# Patient Record
Sex: MALE | Race: BLACK OR AFRICAN AMERICAN | NOT HISPANIC OR LATINO | Employment: FULL TIME | URBAN - METROPOLITAN AREA
[De-identification: names, ages, dates, MRNs, and addresses within clinical notes are randomized per-mention and may not be internally consistent; named-entity substitution may affect disease eponyms.]

---

## 2018-08-17 ENCOUNTER — APPOINTMENT (OUTPATIENT)
Dept: GENERAL RADIOLOGY | Facility: CLINIC | Age: 43
End: 2018-08-17
Attending: EMERGENCY MEDICINE

## 2018-08-17 ENCOUNTER — HOSPITAL ENCOUNTER (EMERGENCY)
Facility: CLINIC | Age: 43
Discharge: HOME | End: 2018-08-17
Attending: EMERGENCY MEDICINE | Admitting: EMERGENCY MEDICINE

## 2018-08-17 VITALS
WEIGHT: 197 LBS | DIASTOLIC BLOOD PRESSURE: 80 MMHG | TEMPERATURE: 98.7 F | OXYGEN SATURATION: 100 % | SYSTOLIC BLOOD PRESSURE: 125 MMHG | HEART RATE: 60 BPM | RESPIRATION RATE: 16 BRPM

## 2018-08-17 DIAGNOSIS — R07.89 CHEST WALL PAIN: ICD-10-CM

## 2018-08-17 LAB
ANION GAP SERPL CALCULATED.3IONS-SCNC: 5 MMOL/L (ref 3–14)
BASOPHILS # BLD AUTO: 0 10E9/L (ref 0–0.2)
BASOPHILS NFR BLD AUTO: 0.4 %
BUN SERPL-MCNC: 13 MG/DL (ref 7–30)
CALCIUM SERPL-MCNC: 8 MG/DL (ref 8.5–10.1)
CHLORIDE SERPL-SCNC: 110 MMOL/L (ref 94–109)
CO2 SERPL-SCNC: 27 MMOL/L (ref 20–32)
CREAT SERPL-MCNC: 0.85 MG/DL (ref 0.66–1.25)
DIFFERENTIAL METHOD BLD: ABNORMAL
EOSINOPHIL # BLD AUTO: 0.1 10E9/L (ref 0–0.7)
EOSINOPHIL NFR BLD AUTO: 2.5 %
ERYTHROCYTE [DISTWIDTH] IN BLOOD BY AUTOMATED COUNT: 13.3 % (ref 10–15)
GFR SERPL CREATININE-BSD FRML MDRD: >90 ML/MIN/1.7M2
GLUCOSE SERPL-MCNC: 93 MG/DL (ref 70–99)
HCT VFR BLD AUTO: 33.9 % (ref 40–53)
HGB BLD-MCNC: 11.7 G/DL (ref 13.3–17.7)
IMM GRANULOCYTES # BLD: 0 10E9/L (ref 0–0.4)
IMM GRANULOCYTES NFR BLD: 0.2 %
INTERPRETATION ECG - MUSE: NORMAL
LYMPHOCYTES # BLD AUTO: 2 10E9/L (ref 0.8–5.3)
LYMPHOCYTES NFR BLD AUTO: 38.1 %
MCH RBC QN AUTO: 27.4 PG (ref 26.5–33)
MCHC RBC AUTO-ENTMCNC: 34.5 G/DL (ref 31.5–36.5)
MCV RBC AUTO: 79 FL (ref 78–100)
MONOCYTES # BLD AUTO: 0.3 10E9/L (ref 0–1.3)
MONOCYTES NFR BLD AUTO: 5.7 %
NEUTROPHILS # BLD AUTO: 2.8 10E9/L (ref 1.6–8.3)
NEUTROPHILS NFR BLD AUTO: 53.1 %
NRBC # BLD AUTO: 0 10*3/UL
NRBC BLD AUTO-RTO: 0 /100
PLATELET # BLD AUTO: 204 10E9/L (ref 150–450)
POTASSIUM SERPL-SCNC: 3.8 MMOL/L (ref 3.4–5.3)
RBC # BLD AUTO: 4.27 10E12/L (ref 4.4–5.9)
SODIUM SERPL-SCNC: 142 MMOL/L (ref 133–144)
TROPONIN I SERPL-MCNC: <0.015 UG/L (ref 0–0.04)
TROPONIN I SERPL-MCNC: <0.015 UG/L (ref 0–0.04)
WBC # BLD AUTO: 5.2 10E9/L (ref 4–11)

## 2018-08-17 PROCEDURE — 71046 X-RAY EXAM CHEST 2 VIEWS: CPT

## 2018-08-17 PROCEDURE — 80048 BASIC METABOLIC PNL TOTAL CA: CPT | Performed by: EMERGENCY MEDICINE

## 2018-08-17 PROCEDURE — 99285 EMERGENCY DEPT VISIT HI MDM: CPT | Performed by: EMERGENCY MEDICINE

## 2018-08-17 PROCEDURE — 93005 ELECTROCARDIOGRAM TRACING: CPT | Performed by: EMERGENCY MEDICINE

## 2018-08-17 PROCEDURE — 84484 ASSAY OF TROPONIN QUANT: CPT | Mod: 91 | Performed by: EMERGENCY MEDICINE

## 2018-08-17 PROCEDURE — 99284 EMERGENCY DEPT VISIT MOD MDM: CPT | Mod: 25 | Performed by: EMERGENCY MEDICINE

## 2018-08-17 PROCEDURE — 25000132 ZZH RX MED GY IP 250 OP 250 PS 637: Performed by: EMERGENCY MEDICINE

## 2018-08-17 PROCEDURE — 93010 ELECTROCARDIOGRAM REPORT: CPT | Mod: Z6 | Performed by: EMERGENCY MEDICINE

## 2018-08-17 PROCEDURE — 85025 COMPLETE CBC W/AUTO DIFF WBC: CPT | Performed by: EMERGENCY MEDICINE

## 2018-08-17 RX ORDER — ASPIRIN 81 MG/1
324 TABLET, CHEWABLE ORAL ONCE
Status: COMPLETED | OUTPATIENT
Start: 2018-08-17 | End: 2018-08-17

## 2018-08-17 RX ORDER — NAPROXEN 500 MG/1
500 TABLET ORAL 2 TIMES DAILY WITH MEALS
Qty: 30 TABLET | Refills: 0 | Status: SHIPPED | OUTPATIENT
Start: 2018-08-17 | End: 2018-09-01

## 2018-08-17 RX ADMIN — ASPIRIN 81 MG CHEWABLE TABLET 324 MG: 81 TABLET CHEWABLE at 07:37

## 2018-08-17 ASSESSMENT — ENCOUNTER SYMPTOMS
COLOR CHANGE: 0
SHORTNESS OF BREATH: 0
EYE REDNESS: 0
ARTHRALGIAS: 0
ABDOMINAL PAIN: 0
DIFFICULTY URINATING: 0
HEADACHES: 0
FEVER: 0
CONFUSION: 0
NECK STIFFNESS: 0

## 2018-08-17 NOTE — ED AVS SNAPSHOT
[facility], Emergency Department    [address]    [facility] [place] [address]    Phone:  [phone]    Fax:  [phone]                                       Ajith Delvalle   MRN: [id number]    Department:  [facility], Emergency Department   Date of Visit:  8/17/2018           Patient Information     Date Of Birth          1975        Your diagnoses for this visit were:     Chest wall pain        You were seen by Tee Claire DO.        Discharge Instructions         Chest Wall Pain: Costochondritis    The chest pain that you have had today is caused by costochondritis. This condition is caused by an inflammation of the cartilage joining your ribs to your breastbone. It is not caused by heart or lung problems. Your healthcare team has made sure that the chest pain you feel is not from a life threatening cause of chest pain such as heart attack, collapsed lung, blood clot in the lung, tear in the aorta, or esophageal rupture. The inflammation may have been brought on by a blow to the chest, lifting heavy objects, intense exercise, or an illness that made you cough and sneeze a lot. It often occurs during times of emotional stress. It can be painful, but it is not dangerous. It usually goes away in 1 to 2 weeks. But it may happen again. Rarely, a more serious condition may cause symptoms similar to costochondritis. That s why it s important to watch for the warning signs listed below.  Home care  Follow these guidelines when caring for yourself at home:    If you feel that emotional stress is a cause of your condition, try to figure out the sources of that stress. It may not be obvious. Learn ways to deal with the stress in your life. This can include regular exercise, muscle relaxation, meditation, or simply taking time out for yourself.    You may use acetaminophen, ibuprofen, or naproxen to control pain, unless another pain medicine was prescribed. If you have liver or kidney disease or  ever had a stomach ulcer, talk with your healthcare provider before using these medicines.    You can also help ease pain by using a hot, wet compress or heating pad. Use this with or without a medicated skin cream that helps relieves pain.    Do stretching exercise as advised by your provider.    Take any prescribed medicines as directed.  Follow-up care  Follow up with your healthcare provider, or as advised, if you do not start to get better in the next 2 days.  When to seek medical advice  Call your healthcare provider right away if any of these occur:    A change in the type of pain. Call if it feels different, becomes more serious, lasts longer, or spreads into your shoulder, arm, neck, jaw, or back.    Shortness of breath or pain gets worse when you breathe    Weakness, dizziness, or fainting    Cough with dark-colored sputum (phlegm) or blood    Abdominal pain    Dark red or black stools    Fever of 100.4 F (38 C) or higher, or as directed by your healthcare provider  Date Last Reviewed: 12/1/2016 2000-2017 [employer]. [address] [place] [address]. All rights reserved. This information is not intended as a substitute for professional medical care. Always follow your healthcare professional's instructions.      Follow-up with your regular physician for further management of her ongoing medical issues.  Return to the emergency department for any new or worsening symptoms.    24 Hour Appointment Hotline       To make an appointment at any [facility] clinic, call [phone] ([phone]). If you don't have a family doctor or clinic, we will help you find one. [facility] clinics are conveniently located to serve the needs of you and your family.             Review of your medicines      START taking        Dose / Directions Last dose taken    naproxen 500 MG tablet   Commonly known as:  NAPROSYN   Dose:  500 mg   Quantity:  30 tablet        Take 1 tablet (500 mg) by mouth 2 times  "daily (with meals) for 15 days   Refills:  0                Prescriptions were sent or printed at these locations (1 Prescription)                   Other Prescriptions                Printed at Department/Unit printer (1 of 1)         naproxen (NAPROSYN) 500 MG tablet                Procedures and tests performed during your visit     Procedure/Test Number of Times Performed    Basic metabolic panel 1    CBC with platelets differential 1    Cardiac Continuous Monitoring 1    EKG 12 lead 1    Peripheral IV: Standard 1    Pulse oximetry nursing 1    Troponin I 2    XR Chest 2 Views 1      Orders Needing Specimen Collection     None      Pending Results     Date and Time Order Name Status Description    8/17/2018 0713 XR Chest 2 Views Preliminary             Pending Culture Results     No orders found from 8/15/2018 to 8/18/2018.            Pending Results Instructions     If you had any lab results that were not finalized at the time of your Discharge, you can call the ED Lab Result RN at [phone]. You will be contacted by this team for any positive Lab results or changes in treatment. The nurses are available 7 days a week from 10A to 6:30P.  You can leave a message 24 hours per day and they will return your call.        Thank you for choosing [facility]       Thank you for choosing [facility] for your care. Our goal is always to provide you with excellent care. Hearing back from our patients is one way we can continue to improve our services. Please take a few minutes to complete the written survey that you may receive in the mail after you visit with us. Thank you!        [facility] Information     [facility] lets you send messages to your doctor, view your test results, renew your prescriptions, schedule appointments and more. To sign up, go to [email or web address] . Click on \"Log in\" on the left side of the screen, which will take you to the Welcome page. Then click on \"Sign up Now\" on the right side of the page. "     You will be asked to enter the access code listed below, as well as some personal information. Please follow the directions to create your username and password.     Your access code is: [email or web address]  Expires: 11/15/2018 10:54 AM     Your access code will  in 90 days. If you need help or a new code, please call your [facility] clinic or [phone].        Care EveryWhere ID     This is your Care EveryWhere ID. This could be used by other organizations to access your [facility] medical records          Equal Access to Services     [facility]: Hadmartín Milligan, nain best, carlos zavaleta, deep gutiérrez . So [facility] [phone].    ATENCIÓN: Si habla español, tiene a kincaid disposición servicios gratuitos de asistencia lingüística. Levy al [phone].    We comply with applicable federal civil rights laws and [place] laws. We do not discriminate on the basis of race, color, national origin, age, disability, sex, sexual orientation, or gender identity.            After Visit Summary       This is your record. Keep this with you and show to your community pharmacist(s) and doctor(s) at your next visit.

## 2018-08-17 NOTE — DISCHARGE INSTRUCTIONS
Chest Wall Pain: Costochondritis    The chest pain that you have had today is caused by costochondritis. This condition is caused by an inflammation of the cartilage joining your ribs to your breastbone. It is not caused by heart or lung problems. Your healthcare team has made sure that the chest pain you feel is not from a life threatening cause of chest pain such as heart attack, collapsed lung, blood clot in the lung, tear in the aorta, or esophageal rupture. The inflammation may have been brought on by a blow to the chest, lifting heavy objects, intense exercise, or an illness that made you cough and sneeze a lot. It often occurs during times of emotional stress. It can be painful, but it is not dangerous. It usually goes away in 1 to 2 weeks. But it may happen again. Rarely, a more serious condition may cause symptoms similar to costochondritis. That s why it s important to watch for the warning signs listed below.  Home care  Follow these guidelines when caring for yourself at home:    If you feel that emotional stress is a cause of your condition, try to figure out the sources of that stress. It may not be obvious. Learn ways to deal with the stress in your life. This can include regular exercise, muscle relaxation, meditation, or simply taking time out for yourself.    You may use acetaminophen, ibuprofen, or naproxen to control pain, unless another pain medicine was prescribed. If you have liver or kidney disease or ever had a stomach ulcer, talk with your healthcare provider before using these medicines.    You can also help ease pain by using a hot, wet compress or heating pad. Use this with or without a medicated skin cream that helps relieves pain.    Do stretching exercise as advised by your provider.    Take any prescribed medicines as directed.  Follow-up care  Follow up with your healthcare provider, or as advised, if you do not start to get better in the next 2 days.  When to seek medical  advice  Call your healthcare provider right away if any of these occur:    A change in the type of pain. Call if it feels different, becomes more serious, lasts longer, or spreads into your shoulder, arm, neck, jaw, or back.    Shortness of breath or pain gets worse when you breathe    Weakness, dizziness, or fainting    Cough with dark-colored sputum (phlegm) or blood    Abdominal pain    Dark red or black stools    Fever of 100.4 F (38 C) or higher, or as directed by your healthcare provider  Date Last Reviewed: 12/1/2016 2000-2017 [employer]. [address] [place] [address]. All rights reserved. This information is not intended as a substitute for professional medical care. Always follow your healthcare professional's instructions.      Follow-up with your regular physician for further management of her ongoing medical issues.  Return to the emergency department for any new or worsening symptoms.

## 2018-08-17 NOTE — ED PROVIDER NOTES
History     Chief Complaint   Patient presents with     Chest Pain     Pt c/o Chest pain after lifting something heavy at work.     GENARO Delvalle is a 43 year old male who presents with a chief complaint of chest pain.  Patient was at work 3 hours ago moving large bundles of sheets.  He had immediate onset of sharp left-sided chest pain when he lifted a large bag if she eats.  The pain resolved when he put the sheets down.  He rested for several minutes and then picked up another bag eliciting similar symptoms.  He then moved small amount of sheets and did not have any issues.  Later as he got into his car and was buckling and he had another episode of sharp chest pain.  This also lasted less than a minute similar to the previous episodes.  At that point he decided to come in to the emergency department for evaluation.  Patient denies any nausea or vomiting or diaphoresis during the symptoms.  He does not have any shortness of breath and did not previously.  He has no known history of coronary artery disease.  His risk factors include smoking as well as hypertension.  He has not had any cough, fevers, other signs of illness.  Patient has not previously had a stress test or angiogram..  Besides the 3 episodes of brief chest pain he has not had any current chest pain.  He has not noticed any previous chest pain before today.    I have reviewed the Medications, Allergies, Past Medical and Surgical History, and Social History in the [employer] system.  Past Medical History:   Diagnosis Date     Hypertension        Past Surgical History:   Procedure Laterality Date     BIOPSY OF SKIN LESION         Family History   Problem Relation Age of Onset     Skin Cancer No family hx of      no family hx of skin cancer       Social History   Substance Use Topics     Smoking status: Current Every Day Smoker     Smokeless tobacco: Never Used     Alcohol use No       Review of Systems   Constitutional: Negative for fever.   HENT:  Negative for congestion.    Eyes: Negative for redness.   Respiratory: Negative for shortness of breath.    Cardiovascular: Positive for chest pain.   Gastrointestinal: Negative for abdominal pain.   Genitourinary: Negative for difficulty urinating.   Musculoskeletal: Negative for arthralgias and neck stiffness.   Skin: Negative for color change.   Neurological: Negative for headaches.   Psychiatric/Behavioral: Negative for confusion.   All other systems reviewed and are negative.      Physical Exam   BP: 155/85  Pulse: 55  Temp: 97.8  F (36.6  C)  Resp: 16  Weight: 89.4 kg (197 lb)  SpO2: 99 %      Physical Exam   Constitutional: No distress.   HENT:   Head: Atraumatic.   Mouth/Throat: Oropharynx is clear and moist. No oropharyngeal exudate.   Eyes: Pupils are equal, round, and reactive to light. No scleral icterus.   Cardiovascular: Normal heart sounds.    Pulmonary/Chest: Breath sounds normal. No respiratory distress. He exhibits no tenderness.   Abdominal: Soft. He exhibits no distension. There is no tenderness.   Musculoskeletal: He exhibits no edema or tenderness.   Neurological: He is alert.   Skin: Skin is warm. He is not diaphoretic.   Psychiatric: He has a normal mood and affect. His behavior is normal.       ED Course     ED Course     Procedures             EKG Interpretation:      Interpreted by Tee Claire  Time reviewed: 0700  Symptoms at time of EKG: none   Rhythm: normal sinus   Rate: 46 bradycardia  Axis: normal  Ectopy: none  Conduction: normal  ST Segments/ T Waves: No ST elevation or depression.  T-wave inversions V3 V4  Q Waves: none  Comparison to prior: No old EKG available    Clinical Impression: normal EKG           Labs Ordered and Resulted from Time of ED Arrival Up to the Time of Departure from the ED   CBC WITH PLATELETS DIFFERENTIAL - Abnormal; Notable for the following:        Result Value    RBC Count 4.27 (*)     Hemoglobin 11.7 (*)     Hematocrit 33.9 (*)     All other  components within normal limits   BASIC METABOLIC PANEL - Abnormal; Notable for the following:     Chloride 110 (*)     Calcium 8.0 (*)     All other components within normal limits   TROPONIN I   TROPONIN I   PULSE OXIMETRY NURSING   CARDIAC CONTINUOUS MONITORING   PERIPHERAL IV CATHETER   PATIENT CARE ORDER          Results for orders placed or performed during the hospital encounter of 08/17/18   XR Chest 2 Views    Narrative    CHEST TWO VIEWS  8/17/2018 8:07 AM     HISTORY: Chest pain.     COMPARISON: None.      Impression    IMPRESSION: Small focus of platelike atelectasis or linear fibrosis  left mid to lower lung zone medially. Otherwise no active  cardiopulmonary disease.   CBC with platelets differential   Result Value Ref Range    WBC 5.2 4.0 - 11.0 10e9/L    RBC Count 4.27 (L) 4.4 - 5.9 10e12/L    Hemoglobin 11.7 (L) 13.3 - 17.7 g/dL    Hematocrit 33.9 (L) 40.0 - 53.0 %    MCV 79 78 - 100 fl    MCH 27.4 26.5 - 33.0 pg    MCHC 34.5 31.5 - 36.5 g/dL    RDW 13.3 10.0 - 15.0 %    Platelet Count 204 150 - 450 10e9/L    Diff Method Automated Method     % Neutrophils 53.1 %    % Lymphocytes 38.1 %    % Monocytes 5.7 %    % Eosinophils 2.5 %    % Basophils 0.4 %    % Immature Granulocytes 0.2 %    Nucleated RBCs 0 0 /100    Absolute Neutrophil 2.8 1.6 - 8.3 10e9/L    Absolute Lymphocytes 2.0 0.8 - 5.3 10e9/L    Absolute Monocytes 0.3 0.0 - 1.3 10e9/L    Absolute Eosinophils 0.1 0.0 - 0.7 10e9/L    Absolute Basophils 0.0 0.0 - 0.2 10e9/L    Abs Immature Granulocytes 0.0 0 - 0.4 10e9/L    Absolute Nucleated RBC 0.0    Basic metabolic panel   Result Value Ref Range    Sodium 142 133 - 144 mmol/L    Potassium 3.8 3.4 - 5.3 mmol/L    Chloride 110 (H) 94 - 109 mmol/L    Carbon Dioxide 27 20 - 32 mmol/L    Anion Gap 5 3 - 14 mmol/L    Glucose 93 70 - 99 mg/dL    Urea Nitrogen 13 7 - 30 mg/dL    Creatinine 0.85 0.66 - 1.25 mg/dL    GFR Estimate >90 >60 mL/min/1.7m2    GFR Estimate If Black >90 >60 mL/min/1.7m2     Calcium 8.0 (L) 8.5 - 10.1 mg/dL   Troponin I   Result Value Ref Range    Troponin I ES <0.015 0.000 - 0.045 ug/L   Troponin I   Result Value Ref Range    Troponin I ES <0.015 0.000 - 0.045 ug/L       Assessments & Plan (with Medical Decision Making)   43-year-old male presents with a chief complaint of chest pain.  His pain came on suddenly during lifting of a heavy pile of sheets.  It was very brief and relieved once he said that shoots down.  The pain recurred when he did a similar reaction later.  Again it was very brief.  Patient was not exerting himself at the time.  He has not had any exertional chest pain or other chest pain to speak of prior to this.  His EKG and troponins are normal and negative.  Patient's risk factors consist of high blood pressure and smoking.  This atypical chest pain does not sound cardiac in nature.  Given the pain came on while he was flexing his chest muscles in order to lift a heavy object and was relieved immediately after he put the object down I suspect this is musculoskeletal in nature.  He does not have any risk factor for pulmonary embolism such as long periods of immobility, travel, history of clotting disorders.  He has not have any central chest pain rating to his back or other symptoms suggestive of aortic dissection.  Discussed the possibility of stress testing with the patient.  They do not want to stay for stress testing at this time.  Recommend he follow-up with his doctor for further evaluation.  We will send him home with a prescription for Naprosyn for his chest wall pain.    I have reviewed the nursing notes.    I have reviewed the findings, diagnosis, plan and need for follow up with the patient.    Discharge Medication List as of 8/17/2018 10:54 AM      START taking these medications    Details   naproxen (NAPROSYN) 500 MG tablet Take 1 tablet (500 mg) by mouth 2 times daily (with meals) for 15 days, Disp-30 tablet, R-0, Local Print             Final diagnoses:    Chest wall pain       8/17/2018   [facility], [place], EMERGENCY DEPARTMENT     Tee Claire,   08/17/18 7879

## 2018-08-17 NOTE — ED AVS SNAPSHOT
[facility], [place], Emergency Department    [address]    [facility] [place] [address]    Phone:  [phone]    Fax:  [phone]                                       Ajith Delvalle   MRN: [id number]    Department:  [facility], Emergency Department   Date of Visit:  8/17/2018           After Visit Summary Signature Page     I have received my discharge instructions, and my questions have been answered. I have discussed any challenges I see with this plan with the nurse or doctor.    ..........................................................................................................................................  Patient/Patient Representative Signature      ..........................................................................................................................................  Patient Representative Print Name and Relationship to Patient    ..................................................               ................................................  Date                                            Time    ..........................................................................................................................................  Reviewed by Signature/Title    ...................................................              ..............................................  Date                                                            Time

## 2022-08-10 ENCOUNTER — MEDICAL CORRESPONDENCE (OUTPATIENT)
Dept: HEALTH INFORMATION MANAGEMENT | Facility: CLINIC | Age: 47
End: 2022-08-10

## 2022-08-17 ENCOUNTER — TRANSFERRED RECORDS (OUTPATIENT)
Dept: HEALTH INFORMATION MANAGEMENT | Facility: CLINIC | Age: 47
End: 2022-08-17

## 2022-08-17 ENCOUNTER — MEDICAL CORRESPONDENCE (OUTPATIENT)
Dept: HEALTH INFORMATION MANAGEMENT | Facility: CLINIC | Age: 47
End: 2022-08-17

## 2022-09-04 ENCOUNTER — TRANSFERRED RECORDS (OUTPATIENT)
Dept: HEALTH INFORMATION MANAGEMENT | Facility: CLINIC | Age: 47
End: 2022-09-04

## 2022-09-12 ENCOUNTER — TRANSFERRED RECORDS (OUTPATIENT)
Dept: HEALTH INFORMATION MANAGEMENT | Facility: CLINIC | Age: 47
End: 2022-09-12

## 2022-09-13 ENCOUNTER — TRANSFERRED RECORDS (OUTPATIENT)
Dept: HEALTH INFORMATION MANAGEMENT | Facility: CLINIC | Age: 47
End: 2022-09-13

## 2022-09-18 ENCOUNTER — TRANSFERRED RECORDS (OUTPATIENT)
Dept: HEALTH INFORMATION MANAGEMENT | Facility: CLINIC | Age: 47
End: 2022-09-18

## 2022-09-19 ENCOUNTER — TRANSFERRED RECORDS (OUTPATIENT)
Dept: HEALTH INFORMATION MANAGEMENT | Facility: CLINIC | Age: 47
End: 2022-09-19

## 2022-09-20 ENCOUNTER — MEDICAL CORRESPONDENCE (OUTPATIENT)
Dept: HEALTH INFORMATION MANAGEMENT | Facility: CLINIC | Age: 47
End: 2022-09-20

## 2022-12-28 ENCOUNTER — TELEPHONE (OUTPATIENT)
Dept: ADDICTION MEDICINE | Facility: CLINIC | Age: 47
End: 2022-12-28

## 2022-12-28 NOTE — TELEPHONE ENCOUNTER
Writer received a ROMMEL assessment, update, insurance information, and an MICHELLE. Forwarded to [facility] to start referral and to scan into the MR.

## 2023-01-02 ENCOUNTER — HOSPITAL ENCOUNTER (OUTPATIENT)
Dept: BEHAVIORAL HEALTH | Facility: CLINIC | Age: 48
Discharge: HOME | End: 2023-01-02
Attending: FAMILY MEDICINE
Payer: COMMERCIAL

## 2023-01-02 VITALS
TEMPERATURE: 98 F | OXYGEN SATURATION: 98 % | DIASTOLIC BLOOD PRESSURE: 88 MMHG | SYSTOLIC BLOOD PRESSURE: 130 MMHG | HEART RATE: 97 BPM

## 2023-01-02 LAB — SARS-COV-2 RNA RESP QL NAA+PROBE: NEGATIVE

## 2023-01-02 PROCEDURE — 1002N00001 HC LODGING PLUS FACILITY CHARGE ADULT

## 2023-01-02 PROCEDURE — U0005 INFEC AGEN DETEC AMPLI PROBE: HCPCS | Performed by: PSYCHIATRY & NEUROLOGY

## 2023-01-02 RX ORDER — LANOLIN ALCOHOL/MO/W.PET/CERES
3 CREAM (GRAM) TOPICAL
COMMUNITY
End: 2023-01-29

## 2023-01-02 RX ORDER — GUAIFENESIN 200 MG/10ML
10 LIQUID ORAL EVERY 4 HOURS PRN
COMMUNITY
End: 2023-01-29

## 2023-01-02 RX ORDER — AMOXICILLIN 250 MG
2 CAPSULE ORAL DAILY PRN
COMMUNITY
End: 2023-01-29

## 2023-01-02 RX ORDER — MAGNESIUM HYDROXIDE/ALUMINUM HYDROXICE/SIMETHICONE 120; 1200; 1200 MG/30ML; MG/30ML; MG/30ML
30 SUSPENSION ORAL EVERY 6 HOURS PRN
COMMUNITY
End: 2023-01-29

## 2023-01-02 RX ORDER — PENICILLIN V POTASSIUM 500 MG/1
500 TABLET, FILM COATED ORAL 4 TIMES DAILY
COMMUNITY
End: 2023-01-29

## 2023-01-02 RX ORDER — LORATADINE 10 MG/1
10 TABLET ORAL DAILY PRN
COMMUNITY
End: 2023-01-29

## 2023-01-02 RX ORDER — IBUPROFEN 200 MG
200-400 TABLET ORAL EVERY 6 HOURS PRN
COMMUNITY
End: 2023-01-29

## 2023-01-02 RX ORDER — ACETAMINOPHEN 325 MG/1
325-650 TABLET ORAL EVERY 4 HOURS PRN
COMMUNITY
End: 2023-01-29

## 2023-01-02 ASSESSMENT — COLUMBIA-SUICIDE SEVERITY RATING SCALE - C-SSRS
6. HAVE YOU EVER DONE ANYTHING, STARTED TO DO ANYTHING, OR PREPARED TO DO ANYTHING TO END YOUR LIFE?: NO
TOTAL  NUMBER OF INTERRUPTED ATTEMPTS LIFETIME: NO
1. IN THE PAST MONTH, HAVE YOU WISHED YOU WERE DEAD OR WISHED YOU COULD GO TO SLEEP AND NOT WAKE UP?: YES
1. HAVE YOU WISHED YOU WERE DEAD OR WISHED YOU COULD GO TO SLEEP AND NOT WAKE UP?: YES
2. HAVE YOU ACTUALLY HAD ANY THOUGHTS OF KILLING YOURSELF?: NO
1. IN THE PAST MONTH, HAVE YOU WISHED YOU WERE DEAD OR WISHED YOU COULD GO TO SLEEP AND NOT WAKE UP?: SEE ABOVE
REASONS FOR IDEATION PAST MONTH: COMPLETELY TO END OR STOP THE PAIN (YOU COULDN'T GO ON LIVING WITH THE PAIN OR HOW YOU WERE FEELING)
TOTAL  NUMBER OF ABORTED OR SELF INTERRUPTED ATTEMPTS LIFETIME: NO
ATTEMPT LIFETIME: NO
REASONS FOR IDEATION LIFETIME: COMPLETELY TO END OR STOP THE PAIN (YOU COULDN'T GO ON LIVING WITH THE PAIN OR HOW YOU WERE FEELING)

## 2023-01-02 ASSESSMENT — ANXIETY QUESTIONNAIRES
GAD7 TOTAL SCORE: 7
4. TROUBLE RELAXING: SEVERAL DAYS
5. BEING SO RESTLESS THAT IT IS HARD TO SIT STILL: SEVERAL DAYS
GAD7 TOTAL SCORE: 7
1. FEELING NERVOUS, ANXIOUS, OR ON EDGE: NOT AT ALL
7. FEELING AFRAID AS IF SOMETHING AWFUL MIGHT HAPPEN: SEVERAL DAYS
2. NOT BEING ABLE TO STOP OR CONTROL WORRYING: SEVERAL DAYS
IF YOU CHECKED OFF ANY PROBLEMS ON THIS QUESTIONNAIRE, HOW DIFFICULT HAVE THESE PROBLEMS MADE IT FOR YOU TO DO YOUR WORK, TAKE CARE OF THINGS AT HOME, OR GET ALONG WITH OTHER PEOPLE: NOT DIFFICULT AT ALL
6. BECOMING EASILY ANNOYED OR IRRITABLE: SEVERAL DAYS
3. WORRYING TOO MUCH ABOUT DIFFERENT THINGS: MORE THAN HALF THE DAYS

## 2023-01-02 ASSESSMENT — PATIENT HEALTH QUESTIONNAIRE - PHQ9: SUM OF ALL RESPONSES TO PHQ QUESTIONS 1-9: 11
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
## 2023-01-02 NOTE — PROGRESS NOTES
Progress Note    This patient had a Assessment and Placement Summary Update on 12-23-22 completed by Chasidy Bruce [facility] ([facility]).  This patient was seen for a face to face update of the Assessment and Placement Summary Update on 1/2/2023  [facility]  OUTSIDE: A paper copy of the Assessment and Placement Summary Update will be placed in the patient's paper chart until being scanned into the patient's electronic medical record in Epic under the Media tab.    Alcohol/Drug use since the last CD evaluation (include date of last use):     Pt reports l/u date of 11-27-22 for heroin/fentanyl and marijuana. Pt denies any withdrawal symptoms.      Please note any other clinical changes since the last CD evaluation (such as medication changes, additional legal charges, detoxification admissions, overdoses, etc.)     No significant changes since the last CD evaluation        ASAM Dimensions Original scores Current Scores   I.) Intoxication and Withdrawal: 1 0   II.) Biomedical:  1 1   III.) Emotional and Behavioral:  2 2   IV.) Readiness to Change:  2 2   V.) Relapse Potential: 4 4   VI.) Recovery Environmental: 4 4     Please list clinical justifications for the above ASAM score changes since the original comprehensive assessment:     The patient's score on Dimension I changed from a 1 to a 0.  The patient had not consumed any alcohol or drugs since 11-27-22 and he does not appear to have any current risk of having significant withdrawal symptoms.        Current KO: Current UA:     0.000     Negative for all screened drugs.       PHQ-9, GALA-7   PHQ-9 on 1/2/2023  GALA-7 on 5/18/2021 1/2/2023    The patient's PHQ-9 score was 11 out of 27, indicating moderate depression. The patient's GALA-7 score was 7 out of 21, indicating mild anxiety.       Additional Risk Factors:    Tendency to be socially isolated and/or cut off from the support of others     A recent death of someone close to  "the patient and/or unresolved grief and loss issues     Significant history of trauma and/or abuse issues     A triggering event(s) leading to humiliation, shame or despair     History of impulsive or aggressive behaviors     Significant legal problems including being at risk of incarceration   Protective Factors:    Having people in his/her life that would prevent the patient from considering a suicide attempt (i.e. young children, spouse, parents, etc.)     An absence of mental health issues or stable and well treated mental health issues     An absence of chronic health problems or stable and well treated chronic health issues     Having easy access to supportive family members     Having a good community support network     Having cultural, Pentecostalism or spiritual beliefs that discourage suicide     Having restricted access to highly lethal means of suicide     Risk Status     0. - Very Low Risk:  Evaluation Counselors:  Document in Epic / SBAR to counselor \"Very Low Risk\".      Treatment Counselors:  Reassess upon admission as applicable, assess weekly in progress notes under Dimension 3 and summarize in Discharge / Treatment summary under Dimension 3.     Additional information to support suicide risk rating: There was no additional information to provide at this time.     [place] Suicide Severity Rating Scale (Lifetime/Recent)  [place] Suicide Severity Rating (Lifetime/Recent) 1/2/2023   1. Wish to be Dead (Lifetime) 1   Wish to be Dead Description (Lifetime) shame, guilt and depression while in long-term   1. Wish to be Dead (Past 1 Month) 1   Wish to be Dead Description (Past 1 Month) see above   2. Non-Specific Active Suicidal Thoughts (Lifetime) 0   Most Severe Ideation Rating (Lifetime) 1   Description of Most Severe Ideation (Lifetime) see above   Most Severe Ideation Rating (Past 1 Month) 1   Description of Most Severe Ideation (Past 1 Month) see above   Frequency (Lifetime) 1   Frequency (Past 1 Month) 1 "   Duration (Lifetime) 1   Duration (Past 1 Month) 1   Controllability (Lifetime) 1   Controllability (Past 1 Month) 1   Deterrents (Lifetime) 1   Deterrents (Past 1 Month) 1   Reasons for Ideation (Lifetime) 5   Reasons for Ideation (Past 1 Month) 5   Actual Attempt (Lifetime) 0   Has subject engaged in non-suicidal self-injurious behavior? (Lifetime) 0   Interrupted Attempts (Lifetime) 0   Aborted or Self-Interrupted Attempt (Lifetime) 0   Preparatory Acts or Behavior (Lifetime) 0   Calculated C-SSRS Risk Score (Lifetime/Recent) Low Risk

## 2023-01-02 NOTE — PROGRESS NOTES
[facility] Assessment      Vital signs:     See flowsheet     Direct admission    Counselor: Group C  Drug of Choice: opioids   Last use:1 month ago   Home clinic/MD:Needs to est   Psychiatrist/therapist: needs to est     Medical history/current conditions:  none    H&P Screen:  H&P within the last 90 days: Yes.  Date: 12/14/22 Location: office visit urgent care     Mental Health diagnosis: Anxiety and depression   Medication compliant?: yes  Recent sucidal thoughts? no     When? na  Current thought of self-harm? no    Plan? Na     Pain assessment:   Pt. Experiencing pain at this time?  No    LP Community Medical Screen for COVID-19    ______________________________________________________________________________________________________________________  Do you have any of the following NEW or worsening symptoms NOT attributed to pre-existing conditions?    No    o Fever of 100.0  F (37.8 C) or over  o Chills  o Cough  o Shortness of Breath  o Loss of taste or smell  o Generalized body aches  o Persistent headache  o Sore throat (or trouble eating or drinking in young children?)  o Nausea, Vomiting, or diarrhea (loose stools)    Did you test positive for COVID-19 in the last 10 days or are you waiting on the test results due to an exposure or symptoms?  No    Has anyone told you to self-quarantine due to exposure to someone with COVID-19?  No    I______________________________________________________________________________________________  If you are admitting directly from the community you will be required to stay in your room until COVID lab results confirm negative. If COVID results are positive, You will have to exit the LP program, quarantine as recommended per CDC and then may return for CD treatment after symptoms have resolved.  What is your exit plan should you be positive for COVID today or anytime during your stay? home    COVID-19 Test completed by LPRN ? Yes    COVID-19 - Pt informed  of the following while at :    1) Practice good hand washing hygiene and avoid touching face    2) If pt has any of the symptoms below, notify staff immediately.      Fever     Cough     Shortness of breath or difficulty breathing     Chills     Repeated shaking with chills    Muscle pain     3) COVID-19 testing may be initiated more than once during your stay.  If COVID results are at anytime positive, the pt will follow exit plan as listed above,  quarantine as recommended per CDC and then may return for CD treatment after symptoms have resolved.     4) Per COVID protocol, during your stay at , social distancing is required AND mouth and nose must be covered at all times with facial mask while out in milieu.      5) Patients will not be allowed to go to any outside appointments, all outside appointments will need to be virtual or by phone    RN Assessment of Patient's Ability to Safely Manage and Self-Administer Respiratory Treatments    Has experience in the management of Respiratory (If NA, indicate and move to Integrative Therapies): NA    Patient tobacco use: 4 cig per day  Are you interested in quitting? no   NRT (Nicotine Replacement therapy) ordered? Pt declined    Pt is aware of the dangers of tobacco cessation and in contemplation.    Pt given written education.    Nutritional Assessment:    Have you ever purged, binged or restricted yourself as a way to control your weight?   No     Are you on a special diet?   No     Do you have any concerns regarding your nutritional status?   No     Have you had any appetite changes in the last 3 months?   No   Have you had weight loss or weight gain of more than 10 lbs in the last 3 months?   If patient gained or lost more than 10 lbs, then refer to program RN / attending Physician for assessment.   No   Was the patient informed of BMI?    Above,  Referral to primary care physician and General nutrition education   Yes   Have you engaged in any risk-taking  behavior that would put you at risk for exposure to blood-borne or sexually transmitted diseases?   No   Do you have any dental problems?   No       Review with pt's for opioid use disorder: (Please delete below if not applicable)    Pt reporting last use of opioid a month ago   Did pt's bring comfort medications and/or Suboxone as instructed to manage withdrawal? Pt does not want suboxone, he is trying t get his Rx for clonidine brought in    LPRN to review with pt the following:     Pt is expected to attend all required LP programming without staff prompting     Compliancy with all prescribed medication self-administration is required     Pt understands LP drug toxicology screening process    Pt given written copy of detox options and/or access to Recovery Clinic    LPRN reviewed with pt the below 'medical concerns/medication refill expectations' while at LP Yes    LP has no rounding provider to assess medical issues or to refill your medications    Please make virtual/phone appt/s with your community provider/s and notify LPRN of date and time    You may not leave LP to attend any medical appt's.     You are responsible for having a plan to refill medications if necessary.  Please allow time to complete this.    Pt verbalizes understanding of the above criteria yes    Nursing Assessment Summary:  As above     On-going nursing intervention required?   No    Acute care visit recommended: no

## 2023-01-02 NOTE — PROGRESS NOTES
Initial Services Plan        Before your first treatment group, please do the following    Immediate health & safety concerns: Go to the emergency room if you start to have withdrawal symptoms.  Look for sober housing and a supportive social network.  Obtain personal items (glasses, hearing aides, medicines, diabetes supplies, clothing, etc.).  Look for a support network (such as AA, NA, DBT group, a Congregation group, etc.)    Suggestions for client during the time between intake & completion of treatment plan:  Tour your treatment center (unit or outpatient clinic).  Introduce yourself to the treatment group.  Spend time getting to know your peers.  Complete your psycho-social paperwork.  Complete the problem list for your treatment plan.  Start drug and alcohol use history.  Review your patient or client handbook.    Client issues to be addressed in the first treatment sessions:  Identify motivations(s) for coming to treatment, i.e. legal, family, job, self  Identify concerns about coming into treatment, i.e. fear of failing again, sharing a room in treatment  Identify concerns about going to group, i.e. fear of talking in group  Identify concerns about contacting [facility]  1/2/2023  12:44 PM

## 2023-01-03 ENCOUNTER — HOSPITAL ENCOUNTER (OUTPATIENT)
Dept: BEHAVIORAL HEALTH | Facility: CLINIC | Age: 48
Discharge: HOME | End: 2023-01-03
Attending: FAMILY MEDICINE
Payer: COMMERCIAL

## 2023-01-03 DIAGNOSIS — F19.20 CHEMICAL DEPENDENCY (H): ICD-10-CM

## 2023-01-03 LAB — FENTANYL UR QL: ABNORMAL

## 2023-01-03 PROCEDURE — 80354 DRUG SCREENING FENTANYL: CPT

## 2023-01-03 PROCEDURE — 1002N00001 HC LODGING PLUS FACILITY CHARGE ADULT

## 2023-01-03 PROCEDURE — H2035 A/D TX PROGRAM, PER HOUR: HCPCS | Mod: HQ

## 2023-01-03 PROCEDURE — 80307 DRUG TEST PRSMV CHEM ANLYZR: CPT

## 2023-01-03 NOTE — GROUP NOTE
"Group Therapy Documentation    PATIENT'S NAME: Ajith Delvalle  MRN:   [id number]  :   1975  ACCT. NUMBER: [id number]  DATE OF SERVICE: 23  START TIME: 12:30 PM  END TIME:  2:30 PM  FACILITATOR(S): Destin Mccormack; Arben Pompa LADC  TOPIC: BEH Group Therapy  Number of patients attending the group:  5  Group Length:  2 Hours    Group Therapy Type: Recovery strategies, Emotion processing, and Health and wellbeing     Summary of Group / Topics Discussed:    Recovery Principles, Sober coping skills, Relationship/socialization, Balanced lifestyle, and Relapse prevention      Group Attendance:  Attended group session    Patient's response to the group topic/interactions:  cooperative with task, discussed personal experience with topic and expressed understanding of topic    Patient appeared to be Actively participating, Attentive and Engaged.        Client specific details:  Recovery group held.  Different hormonal and neurotransmitter interactions were discussed regarding pleasure center and \"feel good\" activities.  Patient reported on past times of abstinence and the rewards of sobriety.    "

## 2023-01-03 NOTE — GROUP NOTE
Group Therapy Documentation    PATIENT'S NAME: Ajith Delvalle  MRN:   [id number]  :   1975  ACCT. NUMBER: [id number]  DATE OF SERVICE: 23  START TIME:  9:00 AM  END TIME: 11:00 AM  FACILITATOR(S): Christina Ovalles LADC  TOPIC: BEH Group Therapy  Number of patients attending the group:  5  Group Length:  2 Hours    Group Therapy Type: Recovery strategies    Summary of Group / Topics Discussed:    Recovery Principles    Patients engaged in a completion ceremony for a peer, and were able to share words of wisdom, appreciate connections, and encourage each other in staying focused and sober.   Patients completed daily check ins and the question of the day.        Patients engaged in reading and processing daily meditations.        Group Attendance:  Attended group session    Patient's response to the group topic/interactions:  cooperative with task    Patient appeared to be Actively participating.        Client specific details:  Ajith  attended AM small group therapy. Patient participated and remained engaged throughout group.

## 2023-01-03 NOTE — GROUP NOTE
Psychoeducation Group Documentation    PATIENT'S NAME: Ajith Delvalle  MRN:   [id number]  :   1975  ACCT. NUMBER: [id number]  DATE OF SERVICE: 23  START TIME:  3:00 PM  END TIME:  4:00 PM  FACILITATOR(S): Eva Verma LADC; Stefany Steele LADC; Betty Ashford  TOPIC: BEH Pyschoeducation  Number of patients attending the group:    Group Length:  1 Hours    Skills Group Therapy Type: Recovery skills, background information on CD and MH By DR. Godoy.    Summary of Group / Topics Discussed:    Symptom management skills          Group Attendance:  Attended group session    Patient's response to the group topic/interactions:  cooperative with task    Patient appeared to be Attentive.         Client specific details: Pt was appropriate and attentive in PM skills group.

## 2023-01-03 NOTE — PROGRESS NOTES
Name: Ajith Delvalle  Date: 1/3/2023  Medical Record: [id number]  Envelope Number: [id number]  List of Contents (List each item separately in new row):   Ibuprofen 800 mg tabs.   Admission:  I am responsible for any personal items that are not sent to the safe or pharmacy.  [facility] is not responsible for loss, theft or damage of any property in my possession.    Patient Signature:  ___________________________________________       Date/Time:__________________________    Staff Signature: __________________________________       Date/Time:__________________________    2nd Staff person, if patient is unable/unwilling to sign:      __________________________________________________________       Date/Time: __________________________    Discharge:  [facility] has returned all of my personal belongings:    Patient Signature: ________________________________________     Date/Time: ____________________________________    Staff Signature: ______________________________________     Date/Time:_____________________________________

## 2023-01-03 NOTE — PROGRESS NOTES
"Comprehensive Assessment Summary     Based on client interview, review of previous assessments and   comprehensive assessment interview the following diagnosis and recommendations are:     Patient: Ajith Delvalle  MRN; [id number]   : 1975  Age: 47 year old Sex: male       Client meets criteria for:  Cannabis Use Disorder Severe - 304.30 (F12.20)  Opioid Use Disorder Severe - 304.00 (F11.20)    Dimension One: Acute Intoxication/Withdrawal Potential     Ratin      (Consider the client's ability to cope with withdrawal symptoms and current state of intoxication) No indication of intoxication or withdrawal. Pt was a direct transfer from the community. Pt denied taking anti-craving medications. Pt reported his last use date of both cannabis and opioids as 2022.       Dimension Two: Biomedical Condition and Complications    Ratin    (Consider the degree to which any physical disorder would interfere with treatment for substance abuse, and the client's ability to tolerate any related discomfort; determine the impact of continued chemical use on the unborn child if the client is pregnant) Pt reported no physical limitations or medical concerns identified which would interfere with full program participation. Pt reported having a PCC at [facility] in [place], [place]. Pt denied having a PCP there. Pt reported dental concerns (chipped teeth), multiple head injuries, and low iron levels.     Dimension Three: Emotional/Behavioral/Cognitive Conditions & Complications  Ratin    (Determine the degree to which any condition or complications are likely to interfere with treatment for substance abuse or with functioning in significant life areas and the likelihood of risk of harm to self or others) Pt reported prior diagnoses of depression and anxiety. Pt reported having been prescribed medications for MH but does not currently have them. Pt reported grief and loss concerns as: \"lost a cousin a couple " "of weeks ago, lost another cousin a couple of years ago, a sister-in-law was killed 6-7 months ago, and lost a baby daughter in .\" Pt reported guilt and shame concerns as: \"being constantly in the court system and [facility] and not being there for his wife and kids.\" Pt reported being restentful of the drug court system due to \"they pulled my away from my sobriety and good life in [facility] where he had a job, house, and cars.\" Pt reported that his emotions, and management of them, do affect his relapses. Pt reported that he maintained three years of sobriety when he lived in [place], [place]. Pt reported that \"things were going good for him in [place] due to being in the child protection program and having a lifestyle change.\" Pt reported that the drug court \"pulled him back to the [facility].\" where he subsequently relapsed. Pt reported that difficult emotions and impulsive behaviors are his main triggers for relapse. Pt lacks follow through of his relapse and coping skills in emotional regulation. Pt's suicide risk rating on 2022 assessment is \"Very Low Risk.\" Pt reported that he was open to trying individual therapy at [facility]. Pt stated that he has had four straight previous years of tx and therapy.     Dimension Four: Treatment Acceptance/Resistance     Ratin    (Consider the amount of support and encouragement necessary to keep the client involved in treatment) Pt expresses verbal motivation to make lifestyle changes. However, his supportive behaviors have lacked both follow through and commitment. Pt continues to use despite serious consequences in major life areas. He presents as in the contemplative stage. Pt reported that his main motivation for tx at this time is to \"stay sober, not go back to using, and to better identify with his emotions, that's it.\"     Dimension Five: Continued Use/Relaspe Prevention     Ratin    (Consider the degree to which the client's recognizes relapse issues " "and has the skills to prevent relapse of either substance use or mental health problems) Pt presents as a high risk for relapse. He has limited insight into his personal relapse cues and effective prevention strategies. Pt reported roughly 50 lifetime arrests with the first being at age 9. Pt reported currently being on probation in [place], [place]. Pt reported attending multiple previous treatment episodes. Despite serious consequences and numerous interventions, his longest period of sobriety is three years. While pt reported a range of sober living and coping skills, pt identified that his follow through \"with what he already knows works for him could use improvement.\" Pt reported previous 12-Step meeting attendance, but stated that \"he doesn't like 12-Step.\" Pt reported that meditation, his spirituality, and when he implements his coping skills help him maintain his sobriety.        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 Pt reported that he does not currently have a job and that \"drug court made him give up his previous job.\" Pt reported that he lacks a sober support network other than for his \"parents, siblings, and wife.\" Pt reported having six children (23 son, 22 son, 20 son, 14 son, 15 daughter, and baby daughter ( ).\" Pt lives with his wife in an apartment. Pt acknowledged that he would most likely have to attend an IOP after discharge from + due to drug court.         I have reviewed the information on the assessment, psychosocial and medical history and checklist: It is current  "

## 2023-01-04 ENCOUNTER — HOSPITAL ENCOUNTER (OUTPATIENT)
Dept: BEHAVIORAL HEALTH | Facility: CLINIC | Age: 48
Discharge: HOME | End: 2023-01-04
Attending: FAMILY MEDICINE
Payer: COMMERCIAL

## 2023-01-04 ENCOUNTER — TELEPHONE (OUTPATIENT)
Dept: BEHAVIORAL HEALTH | Facility: CLINIC | Age: 48
End: 2023-01-04

## 2023-01-04 DIAGNOSIS — F19.20 CHEMICAL DEPENDENCY (H): ICD-10-CM

## 2023-01-04 LAB
FENTANYL UR QL: ABNORMAL
SARS-COV-2 RNA RESP QL NAA+PROBE: NEGATIVE

## 2023-01-04 PROCEDURE — H2035 A/D TX PROGRAM, PER HOUR: HCPCS | Mod: HQ

## 2023-01-04 PROCEDURE — U0003 INFECTIOUS AGENT DETECTION BY NUCLEIC ACID (DNA OR RNA); SEVERE ACUTE RESPIRATORY SYNDROME CORONAVIRUS 2 (SARS-COV-2) (CORONAVIRUS DISEASE [COVID-19]), AMPLIFIED PROBE TECHNIQUE, MAKING USE OF HIGH THROUGHPUT TECHNOLOGIES AS DESCRIBED BY CMS-2020-01-R: HCPCS

## 2023-01-04 PROCEDURE — 1002N00001 HC LODGING PLUS FACILITY CHARGE ADULT

## 2023-01-04 PROCEDURE — 80307 DRUG TEST PRSMV CHEM ANLYZR: CPT

## 2023-01-04 PROCEDURE — 80354 DRUG SCREENING FENTANYL: CPT

## 2023-01-04 NOTE — PROGRESS NOTES
Pt prescribed Penicillin for tooth pain.  Pt missing doses.  Pt informed that in order for antibiotic to work as prescribed he needs to take as ordered.  Pt verbalized understanding and will come to med room 4 times daily for self-administration for tooth pain / potential infection

## 2023-01-04 NOTE — ADDENDUM NOTE
Encounter addended by: Eileen Simon on: 1/4/2023 6:11 AM   Actions taken: Clinical Note Signed
Encounter addended by: Eileen Simon on: 1/4/2023 6:28 AM   Actions taken: Clinical Note Signed
No

## 2023-01-04 NOTE — GROUP NOTE
Psychoeducation Group Documentation    PATIENT'S NAME: Ajith Delvalle  MRN:   [id number]  :   1975  ACCT. NUMBER: [id number]  DATE OF SERVICE: 23  START TIME:  8:30 AM  END TIME:  9:30 AM  FACILITATOR(S): Bernard[facility]; Damion Figueredo, PhD LP  TOPIC: BEH Pyschoeducation  Number of patients attending the group:  4  Group Length:  1 Hours    Skills Group Therapy Type: Recovery skills and Emotion regulation skills    Summary of Group / Topics Discussed:    Balanced lifestyle skills and Relapse prevention skills          Group Attendance:  Attended group session    Patient's response to the group topic/interactions:  cooperative with task    Patient appeared to be Actively participating.         Client specific details:  Ajith gave appropriate feedback..

## 2023-01-04 NOTE — GROUP NOTE
Group Therapy Documentation    PATIENT'S NAME: Ajith Delvalle  MRN:   [id number]  :   1975  ACCT. NUMBER: [id number]  DATE OF SERVICE: 23  START TIME: 12:30 PM  END TIME:  2:30 PM  FACILITATOR(S): Jose Alberto Ramos LADC  TOPIC: BEH Group Therapy  Number of patients attending the group:  9  Group Length:  2 Hours    Group Therapy Type: Recovery strategies, Emotion processing, Daily living/independence skills, and Health and wellbeing     Summary of Group / Topics Discussed:    Recovery Principles, Sober coping skills, Disease of addiction, and Relapse prevention    Group participants engaged in group lecture/activity.       Group Attendance:  Attended group session    Patient's response to the group topic/interactions:  cooperative with task    Patient appeared to be Actively participating, Attentive and Engaged.        Client specific details: Patient actively engaged in group lecture/activity .

## 2023-01-04 NOTE — PROGRESS NOTES
Name: Ajith Delvalle  Date: 1/4/2023  Medical Record: [id number]    Envelope Number: [id number]    List of Contents (List each item separately in new row):   One cracked cell phone  Admission:  I am responsible for any personal items that are not sent to the safe or pharmacy.  [facility] is not responsible for loss, theft or damage of any property in my possession.      Patient Signature:  ___________________________________________       Date/Time:__________________________    Staff Signature: __________________________________       Date/Time:__________________________    2nd Staff person, if patient is unable/unwilling to sign:      __________________________________________________________       Date/Time: __________________________    Discharge:  [facility] has returned all of my personal belongings:    Patient Signature: ________________________________________     Date/Time: ____________________________________    Staff Signature: ______________________________________     Date/Time:_____________________________________

## 2023-01-04 NOTE — TELEPHONE ENCOUNTER
Dr Braxton,  Would you please approve increased dose of Melatonin for this pt from 3mg to 9mg?      Pt has upcoming psych appt to address insomnia on Monday 1/9/23    Please advise.  Thank you    Isaura Carroll RN  [facility]  Nurse Liaison at [facility]  Office - [phone]  Fax - [phone]

## 2023-01-04 NOTE — PROGRESS NOTES
Acknowledgement of Current Treatment Plan - Initial Treatment Plan     INITIAL TREATMENT PLAN:     1. I have participated in creating my treatment plan with my therapist / counselor on __________.     I agree with the plan as it is written in the electronic health record.    Name Signature/Date   Patient     Name of Therapist / Counselor   Signature/Date   Counselor      2. I have completed and reviewed my Safety Plan with my counselor and signed this on _________. I have been given the hard copy of this plan.    Patient signature/date:      _____________________________________________________________________________    3. Last Use Date: __________    Patient signature/date:     _____________________________________________________________________________

## 2023-01-04 NOTE — GROUP NOTE
"Group Therapy Documentation    PATIENT'S NAME: Ajith Delvalle  MRN:   [id number]  :   1975  ACCT. NUMBER: [id number]  DATE OF SERVICE: 23  START TIME:  9:40 AM  END TIME: 11:30 AM  FACILITATOR(S): Christina Ovalles LADC  TOPIC: BEH Group Therapy  Number of patients attending the group:  4  Group Length:  2 Hours    Group Therapy Type: Recovery strategies    Summary of Group / Topics Discussed:    Recovery Principles    Patients completed daily check ins and the question of the day, \" If you could travel anywhere, where would you go, and why?\"       Patients engaged in reading and processing daily meditations. Patients discussed topics of barriers in recovery, and options to avoid.   Patients shared boredom as a major trigger. Staff presented options and patients discussed how to implement into daily living.         Group Attendance:  Attended group session    Patient's response to the group topic/interactions:  cooperative with task    Patient appeared to be Actively participating.        Client specific details:  Ajith  attended AM small group therapy. Patient participated and remained engaged throughout group.       "

## 2023-01-05 ENCOUNTER — HOSPITAL ENCOUNTER (OUTPATIENT)
Dept: BEHAVIORAL HEALTH | Facility: CLINIC | Age: 48
Discharge: HOME | End: 2023-01-05
Attending: FAMILY MEDICINE
Payer: COMMERCIAL

## 2023-01-05 PROCEDURE — H2035 A/D TX PROGRAM, PER HOUR: HCPCS

## 2023-01-05 PROCEDURE — H2035 A/D TX PROGRAM, PER HOUR: HCPCS | Mod: HQ

## 2023-01-05 PROCEDURE — 1002N00001 HC LODGING PLUS FACILITY CHARGE ADULT

## 2023-01-05 NOTE — PROGRESS NOTES
"D: Writer met with patient from 1:30 pm to 2:20 pm for an individual psychotherapy session. The patient is currently admitted to the [facility] program.    Presenting Concerns:  Pt reported he is in LP treatment due to being involved with drug court for \"dirty UAs\" positive for Fentanyl.     Social History:  Pt came from an intact family, his parents are still together. He has one brother and one sister and is the middle child. He is  to his wife since  and had six children. One child is . He moved from [place], [place] to [place], [place] in .     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 He has participated in mental health therapy while in treatment but never outside of treatment.     Chemical Health History:  Pt reported he started using alcohol and marijuana at a young age. Pt reported his father fronted him heroin and marijuana to sell. He reported he tried heroin \"as a dare from his wife.\"     Significant Losses/Abuse/Neglect/Trauma:  Pt reported one of his children is .  In the past year, he has experienced 2 cousins and his sister-in-law passing away. One of the cousins and the sister-in-law were murdered. He also reported his good friend passed away three years ago.     Medical Concerns:  Pt reported he has no medical concerns. He has been diagnosed with High Blood Pressure in the past and has a PCP appointment set for 2023 as a wellness check.     Strengths and Limitations:  Pt reported he has a strong work ethic, likes to work on electronics and cars.     Pt reported his limitations involve creativity and artistic ability.     I: Writer offered support, compassion, and validation to patient. Writer worked on self-compassion, vulnerability, and accepting support from others with patient.     A: Pt appeared open to discussing his life story with writer. He " appears to be in the contemplative stage of change.     P: Writer will meet with pt for another psychotherapy session on 1/12/2023.

## 2023-01-05 NOTE — GROUP NOTE
"Group Therapy Documentation    PATIENT'S NAME: Ajith Delvalle  MRN:   [id number]  :   1975  ACCT. NUMBER: [id number]  DATE OF SERVICE: 23  START TIME:  9:00 AM  END TIME: 11:00 AM  FACILITATOR(S): Christina Ovalles LADC; Destin Mccormack  TOPIC: BEH Group Therapy  Number of patients attending the group: 6  Group Length:  2 Hours    Group Therapy Type: Recovery Strategies    Summary of Group / Topics Discussed:    Recovery Principles, Balanced lifestyle, Disease of addiction, and Relapse prevention    Patients completed daily check ins and the question of the day. Patients jaymie random recovery related questions about themselves in relation to their mindsets, thoughts, challenges, barriers, accomplishments in their own recovery. Patients shared responses with peers/staff    A peer presented a \"Drug Hx\" assignment and peers/staff shared feedback.    Patients discussed the differences of being sober (Abstinent) and being sober and working a recovery plan (Abstinent, Recovery Maintenance, Overall Wellness).           Group Attendance:  Attended group session    Patient's response to the group topic/interactions:  cooperative with task    Patient appeared to be Actively participating.        Client specific details:  Ajith  attended AM small group therapy. Patient participated and remained engaged throughout group.         "

## 2023-01-05 NOTE — PROGRESS NOTES
Name: Ajith Delvalle  Date: 1/5/2023  Medical Record: [id number]    Envelope Number: [id number]    List of Contents (List each item separately in new row):   Aspirin 325 mg Tablets   Ibuprofen 200 mg Tablets     Admission:  I am responsible for any personal items that are not sent to the safe or pharmacy.  [facility] is not responsible for loss, theft or damage of any property in my possession.      Patient Signature:  ___________________________________________       Date/Time:__________________________    Staff Signature: __________________________________       Date/Time:__________________________    2nd Staff person, if patient is unable/unwilling to sign:      __________________________________________________________       Date/Time: __________________________      Discharge:  [facility] has returned all of my personal belongings:    Patient Signature: ________________________________________     Date/Time: ____________________________________    Staff Signature: ______________________________________     Date/Time:_____________________________________

## 2023-01-05 NOTE — PROGRESS NOTES
3  SUBJECTIVE:   CC: Ajith Delvalle is an 47 year old male who presents for preventive health visit.       Patient has been advised of split billing requirements and indicates understanding: Yes  Healthy Habits:    Do you get at least three servings of calcium containing foods daily (dairy, green leafy vegetables, etc.)? yes    Amount of exercise or daily activities, outside of work: 1 hour per week    Problems taking medications regularly No    Medication side effects: No    Have you had an eye exam in the past two years? no    Do you see a dentist twice per year? no    Do you have sleep apnea, excessive snoring or daytime drowsiness?no      Ajith presents with a medical opinion form to complete.  He states that this was last completed by a treatment facility (for drug addiction and depression and anxiety) he was at 3 months ago and it needs renewal.  He has been off of work about 6 months.  He is still in an inpatient treatment program for four more months.  He has been off of Suboxone for a few months but is thinking about restarting this through his treatment program.       He is on clonidine for anxiety.  He is on Seroquel but doesn't like this due to increased appetite and he'd like to change back to Wellbutrin (he just stopped taking when he relapsed, it did work for him in the past).  He says the Seroquel isn't helping at all, just making him sleep.  The clonidine is helping with anxiety.  He is not seeing a psychiatrist through his current treatment program.  He says the treatment program isn't prescribing medications so that is why he is here at today's appointment.      He says his iron levels are low- when he goes to donate, they tell him he is too low.  He is not on any iron supplement right now, but has taken them in the past but they didn't work.  He took OTC iron.      He reports having a negative HIV test recently at treatment.      Today's PHQ-9    PHQ 1/9/2023   PHQ-9 Total Score 13   Q9:  "Thoughts of better off dead/self-harm past 2 weeks Not at all   Some encounter information is confidential and restricted. Go to Review Flowsheets activity to see all data.     GALA-7 SCORE 1/9/2023   Total Score 13   Some encounter information is confidential and restricted. Go to Review Flowsheets activity to see all data.       Abuse: Current or Past(Physical, Sexual or Emotional)- No  Do you feel safe in your environment? Yes     PHQ 1/9/2023   PHQ-9 Total Score 13   Q9: Thoughts of better off dead/self-harm past 2 weeks Not at all   Some encounter information is confidential and restricted. Go to Review Flowsheets activity to see all data.         Have you ever done Advance Care Planning? (For example, a Health Directive, POLST, or a discussion with a medical provider or your loved ones about your wishes): No, not interested    Social History     Tobacco Use     Smoking status: Every Day     Smokeless tobacco: Never   Substance Use Topics     Alcohol use: No     Alcohol/week: 0.0 standard drinks     If you drink alcohol do you typically have >3 drinks per day or >7 drinks per week? No                      Last PSA: No results found for: PSA    Reviewed orders with patient. Reviewed health maintenance and updated orders accordingly - Yes      Reviewed and updated as needed this visit by clinical staff   Tobacco  Allergies  Meds  Problems             Reviewed and updated as needed this visit by Provider     Meds  Problems                ROS:  10 point ROS of systems including Constitutional, Eyes, Respiratory, Cardiovascular, Gastroenterology, Genitourinary, Integumentary, Muscularskeletal, Psychiatric were all negative except for pertinent positives noted in my HPI.     OBJECTIVE:   /81 (BP Location: Right arm, Patient Position: Sitting, Cuff Size: Adult Large)   Pulse 68   Ht 1.674 m (5' 5.91\")   Wt 97.8 kg (215 lb 9.6 oz)   SpO2 97%   BMI 34.90 kg/m    EXAM:  GENERAL: healthy, alert and no " distress  HENT: ear canals and TM's normal, nose and mouth without ulcers or lesions  NECK: no adenopathy, no asymmetry, masses, or scars and thyroid normal to palpation  RESP: lungs clear to auscultation - no rales, rhonchi or wheezes  CV: regular rate and rhythm, normal S1 S2, no S3 or S4, no murmur, click or rub, no peripheral edema and peripheral pulses strong  ABDOMEN: soft, nontender, no hepatosplenomegaly, no masses and bowel sounds normal  MS: no gross musculoskeletal defects noted, no edema  NEURO: Normal strength and tone, mentation intact and speech normal  PSYCH: mentation appears normal, affect normal/bright      ASSESSMENT/PLAN:   (Z00.00) Routine general medical examination at a health care facility  (primary encounter diagnosis)      Immunizations: 2nd dose of Hep A/B vax and flu vax given today.  Discussed checking pharmacy for 2nd dose of COVID vaccine as we only have the boosters in clinic.  Also check pharmacy for tetanus vaccine     Lab Studies: as below.  He reports HIV screening recently at his treatment program    Colonoscopy/FIT: discussed options and he'd like to do Cologuard    Advanced care planning: pt declined       (F19.20) Chemical dependency (H)  Comment: Ajith is currently in an inpatient treatment program, planned for 4 more months.  He is not able to work while in the program, and requests a medical opinion form .  He is not currently on suboxone, but plans to discuss with his program resuming this.   Plan: [place] form completed he is not able to work for the foreseeable future.  Form given back to patient.     (F41.8) Depression with anxiety  Comment: He feels the Seroquel isn't working well and is causing weight gain.  He'd like to get off this and go back to Wellbutrin.  We'll start the Welbutrin today and continue current Seroquel dose for now to give the Wellbutrin some time to kick in.  Follow-up in 1 month to reassess and start weaning Seroquel if he is doing well.  Continue  "current clonidine dosing.   Plan: buPROPion (WELLBUTRIN XL) 150 MG 24 hr tablet,         cloNIDine (CATAPRES) 0.1 MG tablet, QUEtiapine         (SEROQUEL) 300 MG tablet            (D64.9) Anemia, unspecified type  Comment: He reports a prior history of iron deficiency that didn't improve on PO iron.  Recheck labs today.    Plan: CBC with platelets, Iron and iron binding         capacity, Ferritin            (Z13.220) Lipid screening  Comment:   Plan: Lipid panel reflex to direct LDL Non-fasting            (Z13.1) Encounter for screening examination for impaired glucose regulation and diabetes mellitus  Comment:   Plan: Glucose            (Z11.59) Need for hepatitis C screening test  Comment:   Plan: Hepatitis C Screen Reflex to HCV RNA Quant and         Genotype            (Z12.11) Special screening for malignant neoplasms, colon  Comment:   Plan: COLOGDAVID(EXACT SCIENCES)              COUNSELING:  Reviewed preventive health counseling, as reflected in patient instructions    Estimated body mass index is 34.9 kg/m  as calculated from the following:    Height as of this encounter: 1.674 m (5' 5.91\").    Weight as of this encounter: 97.8 kg (215 lb 9.6 oz).    He reports that he has been smoking. He has never used smokeless tobacco.  Nicotine/Tobacco Cessation Plan:   patient currently in substance abuse treatment program      John Clarke MD  [facility] INTER[facility]IC[facility] [place]   "

## 2023-01-05 NOTE — GROUP NOTE
Psychoeducation Group Documentation    PATIENT'S NAME: Ajith Delvalle  MRN:   [id number]  :   1975  ACCT. NUMBER: [id number]  DATE OF SERVICE: 23  START TIME:  3:00 PM  END TIME:  4:00 PM  FACILITATOR(S): Zaki Benton LADC; Mikel Blair [facility]; [facility]  TOPIC: BEH Pyschoeducation  Number of patients attending the group: 16  Group Length:  1 Hours    Skills Group Therapy Type: Recovery skills,     Summary of Group / Topics Discussed:    Relapse prevention skills, Gambling lecture (By  Kyra Bose,)          Group Attendance:  Attended group session    Patient's response to the group topic/interactions:  cooperative with task    Patient appeared to be Attentive.         Client specific details: Pt was appropriate and attentive in PM Skills group

## 2023-01-05 NOTE — GROUP NOTE
Group Therapy Documentation    PATIENT'S NAME: Ajith Delvalle  MRN:   [id number]  :   1975  ACCT. NUMBER: [id number]  DATE OF SERVICE: 23  START TIME: 12:30 PM  END TIME:  2:30 PM  FACILITATOR(S): Destin Mccormack; Arben Pompa LADC  TOPIC: BEH Group Therapy  Number of patients attending the group:  06  Group Length:  2 Hours    Group Therapy Type: Recovery strategies and Emotion processing    Summary of Group / Topics Discussed:    Recovery Principles, Sober coping skills, Disease of addiction, and Relapse prevention      Group Attendance:  Attended group session and Excused from group session    Patient's response to the group topic/interactions:  cooperative with task and discussed personal experience with topic    Patient appeared to be Actively participating.        Client specific details:  Group held with all patients, all patients participated and gave appropriate feedback to others while some patients presented homework assignments.  Patient participated in one hour of group and had an excused absence for the second half of group.

## 2023-01-05 NOTE — PROGRESS NOTES
LATE NOTE ENTRY    Comprehensive Assessment UPDATE         Comprehensive Summary Update and Review  Counselor met with patient on 1/3/2023 and reviewed the Comprehensive Assessment.       The following updates have been made: None

## 2023-01-05 NOTE — PATIENT INSTRUCTIONS
Check your pharmacy for a tetanus booster.     Check the pharmacy for the 2nd COVID vaccine in the initial Moderna series (not a booster).      *    Start Wellbutrin now and continue the current Seroquel dose.  We'll meeting in one month to see how things are going and decrease Seroquel if going well.        Preventive Health Recommendations  Male Ages 40 to 49    Yearly exam:             See your health care provider every year in order to  o   Review health changes.   o   Discuss preventive care.    o   Review your medicines if your doctor has prescribed any.  You should be tested each year for STDs (sexually transmitted diseases) if you re at risk.   Have a cholesterol test every 5 years.   Have a colonoscopy (test for colon cancer) if someone in your family has had colon cancer or polyps before age 50.   After age 45, have a diabetes test (fasting glucose). If you are at risk for diabetes, you should have this test every 3 years.    Talk with your health care provider about whether or not a prostate cancer screening test (PSA) is right for you.    Shots: Get a flu shot each year. Get a tetanus shot every 10 years.     Nutrition:  Eat at least 5 servings of fruits and vegetables daily.   Eat whole-grain bread, whole-wheat pasta and brown rice instead of white grains and rice.   Get adequate Calcium and Vitamin D.     Lifestyle  Exercise for at least 150 minutes a week (30 minutes a day, 5 days a week). This will help you control your weight and prevent disease.   Limit alcohol to one drink per day.   No smoking.   Wear sunscreen to prevent skin cancer.   See your dentist every six months for an exam and cleaning.

## 2023-01-05 NOTE — PROGRESS NOTES
Name: Ajith Delvalle  Date: 1/5/2023  Medical Record: [id number]  Envelope Number: [id number]  List of Contents (List each item separately in new row):     2 films of Suboxone 8-2 mg.    Admission:  I am responsible for any personal items that are not sent to the safe or pharmacy.  [facility] is not responsible for loss, theft or damage of any property in my possession.    Patient Signature:  ___________________________________________       Date/Time:__________________________    Staff Signature: __________________________________       Date/Time:__________________________    [facility] Staff person, if patient is unable/unwilling to sign:    __________________________________________________________       Date/Time: __________________________    Discharge:  [facility] has returned all of my personal belongings:    Patient Signature: ________________________________________     Date/Time: ____________________________________    Staff Signature: ______________________________________     Date/Time:_____________________________________

## 2023-01-06 ENCOUNTER — HOSPITAL ENCOUNTER (OUTPATIENT)
Dept: BEHAVIORAL HEALTH | Facility: CLINIC | Age: 48
Discharge: HOME | End: 2023-01-06
Attending: FAMILY MEDICINE
Payer: COMMERCIAL

## 2023-01-06 PROCEDURE — H2035 A/D TX PROGRAM, PER HOUR: HCPCS | Mod: HQ

## 2023-01-06 PROCEDURE — 1002N00001 HC LODGING PLUS FACILITY CHARGE ADULT

## 2023-01-06 NOTE — PROGRESS NOTES
Name: Ajith Delvalle  Date: 1/6/2023  Medical Record: [id number]    Envelope Number: [id number]    List of Contents (List each item separately in new row):   3 cell phones +      Admission:  I am responsible for any personal items that are not sent to the safe or pharmacy.  [facility] is not responsible for loss, theft or damage of any property in my possession.  Patient Signature:  ___________________________________________       Date/Time:__________________________    Staff Signature: __________________________________       Date/Time:__________________________    2nd Staff person, if patient is unable/unwilling to sign:      __________________________________________________________       Date/Time: __________________________    Discharge:  [facility] has returned all of my personal belongings:    Patient Signature: ________________________________________     Date/Time: ____________________________________    Staff Signature: ______________________________________     Date/Time:_____________________________________

## 2023-01-06 NOTE — GROUP NOTE
Group Therapy Documentation    PATIENT'S NAME: Ajith Delvalle  MRN:   [id number]  :   1975  ACCT. NUMBER: [id number]  DATE OF SERVICE: 23  START TIME:  9:00 AM  END TIME: 11:00 AM  FACILITATOR(S): Mikel Blair LADC  TOPIC: BEH Group Therapy  Number of patients attending the group:  5  Group Length:  2 Hours    Group Therapy Type: Recovery strategies    Summary of Group / Topics Discussed:    Recovery Principles, Coping/DBT informed care, Trauma informed care, Disease of addiction, and Emotions/expression      Group Attendance:  Attended group session    Patient's response to the group topic/interactions:  cooperative with task    Patient appeared to be Attentive and Engaged.        Client specific details:  Ajith participated in morning group. He checked in and took part in the reading and discussion. For the second part of group he gave positive feedback on his peer s assignments.

## 2023-01-06 NOTE — GROUP NOTE
Group Therapy Documentation    PATIENT'S NAME: Ajith Delvalle  MRN:   [id number]  :   1975  ACCT. NUMBER: [id number]  DATE OF SERVICE: 23  START TIME: 12:30 PM  END TIME:  2:30 PM  FACILITATOR(S): Stefany Steele [facility]; Arben Pompa [facility]; Zaki Benton [facility]; Eva Verma [facility]  TOPIC: BEH Group Therapy  Number of patients attending the group:  16  Group Length:  2 Hours    Group Therapy Type: Emotion processing    Summary of Group / Topics Discussed:    Recovery Principles, Emotions/expression, and Leisure explorations/use of leisure time      Group Attendance:  Attended group session    Patient's response to the group topic/interactions:  cooperative with task    Patient appeared to be Attentive.        Client specific details: Patient participated in recovery supportive activity, watching a therapeutic movie on impact of addiction in the family, and forgiveness. Pt was appropriate and attentive.

## 2023-01-07 ENCOUNTER — HOSPITAL ENCOUNTER (OUTPATIENT)
Dept: BEHAVIORAL HEALTH | Facility: CLINIC | Age: 48
Discharge: HOME | End: 2023-01-07
Attending: FAMILY MEDICINE
Payer: COMMERCIAL

## 2023-01-07 PROCEDURE — H2035 A/D TX PROGRAM, PER HOUR: HCPCS | Mod: HQ

## 2023-01-07 PROCEDURE — 1002N00001 HC LODGING PLUS FACILITY CHARGE ADULT

## 2023-01-07 NOTE — GROUP NOTE
Group Therapy Documentation    PATIENT'S NAME: Ajith Delvalle  MRN:   [id number]  :   1975  [id number]  DATE OF SERVICE: 23  START TIME: 12:30 PM  END TIME:  2:30 PM  FACILITATOR(S): Amanda Jacobo LADC; Jose Alberto Ramos LADC; Betty Ashford  TOPIC: BEH Group Therapy  Number of patients attending the group:  18  Group Length:  2 Hours    Group Therapy Type: Recovery strategies    Summary of Group / Topics Discussed:    Sober coping skills, Disease of addiction, and Relapse prevention    Patients participated in a relapse prevention skills workshop, gaining knowledge on how their behaviors in active use can show up as thought patterns in early recovery. Patients learned how to identify their addictive thought patterns and coping strategies to reframe these thoughts to avoid return to use of substances. Each patient had an opportunity to process the information, ask questions of the facilitator, and provide constructive feedback to peers who shared.      Group Attendance:  Attended group session    Patient's response to the group topic/interactions:  cooperative with task    Patient appeared to be Attentive and Engaged.        Client specific details:  Patient participated in relapse prevention workshop, demonstrating active listening skills, engaging in the skills activity, and providing appropriate feedback to peers who shared.

## 2023-01-07 NOTE — GROUP NOTE
Group Therapy Documentation    PATIENT'S NAME: Ajith Delvalle  MRN:   [id number]  :   1975  ACCT. NUMBER: [id number]  DATE OF SERVICE: 23  START TIME: 10:00 AM  END TIME: 11:00 AM  FACILITATOR(S): Jose Alberto Ramos LADC; Betty Ashford; Amanda Jacobo LADC  TOPIC: BEH Group Therapy  Number of patients attending the group:  17  Group Length:  1 Hours    Group Therapy Type: Recovery strategies and Emotion processing    Summary of Group / Topics Discussed:    Sober coping skills, Relationship/socialization, and Emotions/expression    Group participants completed a personality test, followed by a related group discussion.       Group Attendance:  Attended group session    Patient's response to the group topic/interactions:  cooperative with task    Patient appeared to be Actively participating, Attentive and Engaged.        Client specific details: Patient engaged in group discussion/activity.

## 2023-01-08 ENCOUNTER — HOSPITAL ENCOUNTER (OUTPATIENT)
Dept: BEHAVIORAL HEALTH | Facility: CLINIC | Age: 48
Discharge: HOME | End: 2023-01-08
Attending: FAMILY MEDICINE
Payer: COMMERCIAL

## 2023-01-08 DIAGNOSIS — F19.20 CHEMICAL DEPENDENCY (H): ICD-10-CM

## 2023-01-08 LAB — SARS-COV-2 RNA RESP QL NAA+PROBE: NEGATIVE

## 2023-01-08 PROCEDURE — H2035 A/D TX PROGRAM, PER HOUR: HCPCS | Mod: HQ

## 2023-01-08 PROCEDURE — 1002N00001 HC LODGING PLUS FACILITY CHARGE ADULT

## 2023-01-08 PROCEDURE — U0005 INFEC AGEN DETEC AMPLI PROBE: HCPCS

## 2023-01-08 NOTE — GROUP NOTE
Group Therapy Documentation    PATIENT'S NAME: Ajith Delvalle  MRN:   [id number]  :   1975  ACCT. NUMBER: [id number]  DATE OF SERVICE: 23  START TIME: 12:30 PM  END TIME:  1:30 PM  FACILITATOR(S): Betty Ashford  TOPIC: BEH Group Therapy  Number of patients attending the group:  18    Group Length:  1 Hours    Group Therapy Type: Recovery strategies, Emotion processing, and Daily living/independence skills    Summary of Group / Topics Discussed:    Recovery Principles, Sober coping skills, Relationship/socialization, and Relapse prevention      Group Attendance:  Attended group session    Patient's response to the group topic/interactions:  cooperative with task    Patient appeared to be Actively participating, Attentive and Engaged.        Client specific details:  Patient attended group session and was attentive and participative.

## 2023-01-08 NOTE — GROUP NOTE
Group Therapy Documentation    PATIENT'S NAME: Ajith Delvalle  MRN:   [id number]  :   1975  ACCT. NUMBER: [id number]  DATE OF SERVICE: 23  START TIME:  8:45 AM  END TIME: 10:45 AM  FACILITATOR(S): Tamela Luna RN; Jose Alberto Ramos LADC; Betty Ashford  TOPIC: BEH Group Therapy  Number of patients attending the group:  18  Group Length:  2 Hours    Group Therapy Type: Health and wellbeing     Summary of Group / Topics Discussed:    Balanced lifestyle and Nutrition      Group activity included a lecture from staff registered nurse regarding nutrition and healthy eating habits, followed by a related discussion.       Group Attendance:  Attended group session    Patient's response to the group topic/interactions:  cooperative with task    Patient appeared to be Actively participating, Attentive and Engaged.        Client specific details:  Patient actively engaged in group lecture/discussion.

## 2023-01-09 ENCOUNTER — HOSPITAL ENCOUNTER (OUTPATIENT)
Dept: BEHAVIORAL HEALTH | Facility: CLINIC | Age: 48
Discharge: HOME | End: 2023-01-09
Attending: FAMILY MEDICINE
Payer: COMMERCIAL

## 2023-01-09 ENCOUNTER — OFFICE VISIT (OUTPATIENT)
Dept: INTERNAL MEDICINE | Facility: CLINIC | Age: 48
End: 2023-01-09
Payer: COMMERCIAL

## 2023-01-09 ENCOUNTER — TELEPHONE (OUTPATIENT)
Dept: INTERNAL MEDICINE | Facility: CLINIC | Age: 48
End: 2023-01-09

## 2023-01-09 ENCOUNTER — LAB (OUTPATIENT)
Dept: LAB | Facility: CLINIC | Age: 48
End: 2023-01-09
Payer: COMMERCIAL

## 2023-01-09 ENCOUNTER — TELEPHONE (OUTPATIENT)
Dept: ADDICTION MEDICINE | Facility: CLINIC | Age: 48
End: 2023-01-09

## 2023-01-09 VITALS
OXYGEN SATURATION: 97 % | WEIGHT: 215.6 LBS | SYSTOLIC BLOOD PRESSURE: 121 MMHG | DIASTOLIC BLOOD PRESSURE: 81 MMHG | BODY MASS INDEX: 34.65 KG/M2 | HEART RATE: 68 BPM | HEIGHT: 66 IN

## 2023-01-09 DIAGNOSIS — F19.20 CHEMICAL DEPENDENCY (H): ICD-10-CM

## 2023-01-09 DIAGNOSIS — Z13.220 LIPID SCREENING: ICD-10-CM

## 2023-01-09 DIAGNOSIS — D64.9 ANEMIA, UNSPECIFIED TYPE: ICD-10-CM

## 2023-01-09 DIAGNOSIS — Z13.1 ENCOUNTER FOR SCREENING EXAMINATION FOR IMPAIRED GLUCOSE REGULATION AND DIABETES MELLITUS: ICD-10-CM

## 2023-01-09 DIAGNOSIS — R73.09 ELEVATED GLUCOSE: ICD-10-CM

## 2023-01-09 DIAGNOSIS — D50.9 IRON DEFICIENCY ANEMIA, UNSPECIFIED IRON DEFICIENCY ANEMIA TYPE: ICD-10-CM

## 2023-01-09 DIAGNOSIS — F32.A ANXIETY AND DEPRESSION: Primary | ICD-10-CM

## 2023-01-09 DIAGNOSIS — Z11.59 NEED FOR HEPATITIS C SCREENING TEST: ICD-10-CM

## 2023-01-09 DIAGNOSIS — Z12.11 SPECIAL SCREENING FOR MALIGNANT NEOPLASMS, COLON: ICD-10-CM

## 2023-01-09 DIAGNOSIS — F41.8 DEPRESSION WITH ANXIETY: ICD-10-CM

## 2023-01-09 DIAGNOSIS — E11.9 TYPE 2 DIABETES MELLITUS WITHOUT COMPLICATION, WITHOUT LONG-TERM CURRENT USE OF INSULIN (H): ICD-10-CM

## 2023-01-09 DIAGNOSIS — F41.9 ANXIETY AND DEPRESSION: Primary | ICD-10-CM

## 2023-01-09 DIAGNOSIS — Z23 NEED FOR VACCINATION: ICD-10-CM

## 2023-01-09 DIAGNOSIS — Z00.00 ROUTINE GENERAL MEDICAL EXAMINATION AT A HEALTH CARE FACILITY: Primary | ICD-10-CM

## 2023-01-09 PROBLEM — F11.20 OPIOID DEPENDENCE ON AGONIST THERAPY (H): Status: ACTIVE | Noted: 2020-01-31

## 2023-01-09 LAB
CHOLEST SERPL-MCNC: 151 MG/DL
ERYTHROCYTE [DISTWIDTH] IN BLOOD BY AUTOMATED COUNT: 14.7 % (ref 10–15)
FASTING STATUS PATIENT QL REPORTED: NO
FENTANYL UR QL: ABNORMAL
FENTANYL UR-MCNC: 4.2 NG/ML
FERRITIN SERPL-MCNC: 76 NG/ML (ref 31–409)
GLUCOSE SERPL-MCNC: 112 MG/DL (ref 70–99)
HBA1C MFR BLD: 6.7 %
HCT VFR BLD AUTO: 37.7 % (ref 40–53)
HCV AB SERPL QL IA: NONREACTIVE
HDLC SERPL-MCNC: 58 MG/DL
HGB BLD-MCNC: 13.2 G/DL (ref 13.3–17.7)
IRON BINDING CAPACITY (ROCHE): 320 UG/DL (ref 240–430)
IRON SATN MFR SERPL: 16 % (ref 15–46)
IRON SERPL-MCNC: 52 UG/DL (ref 61–157)
LDLC SERPL CALC-MCNC: 76 MG/DL
MCH RBC QN AUTO: 27.4 PG (ref 26.5–33)
MCHC RBC AUTO-ENTMCNC: 35 G/DL (ref 31.5–36.5)
MCV RBC AUTO: 78 FL (ref 78–100)
NONHDLC SERPL-MCNC: 93 MG/DL
NORFENTANYL UR-MCNC: 18.7 NG/ML
PLATELET # BLD AUTO: 269 10E3/UL (ref 150–450)
RBC # BLD AUTO: 4.82 10E6/UL (ref 4.4–5.9)
TRIGL SERPL-MCNC: 85 MG/DL
WBC # BLD AUTO: 8 10E3/UL (ref 4–11)

## 2023-01-09 PROCEDURE — 82947 ASSAY GLUCOSE BLOOD QUANT: CPT | Performed by: PATHOLOGY

## 2023-01-09 PROCEDURE — 99000 SPECIMEN HANDLING OFFICE-LAB: CPT | Performed by: PATHOLOGY

## 2023-01-09 PROCEDURE — 80061 LIPID PANEL: CPT | Performed by: PATHOLOGY

## 2023-01-09 PROCEDURE — 90472 IMMUNIZATION ADMIN EACH ADD: CPT | Performed by: INTERNAL MEDICINE

## 2023-01-09 PROCEDURE — 80307 DRUG TEST PRSMV CHEM ANLYZR: CPT

## 2023-01-09 PROCEDURE — 83036 HEMOGLOBIN GLYCOSYLATED A1C: CPT | Mod: 90 | Performed by: PATHOLOGY

## 2023-01-09 PROCEDURE — 36415 COLL VENOUS BLD VENIPUNCTURE: CPT | Performed by: PATHOLOGY

## 2023-01-09 PROCEDURE — 83540 ASSAY OF IRON: CPT | Performed by: PATHOLOGY

## 2023-01-09 PROCEDURE — 90471 IMMUNIZATION ADMIN: CPT | Performed by: INTERNAL MEDICINE

## 2023-01-09 PROCEDURE — 85027 COMPLETE CBC AUTOMATED: CPT | Performed by: PATHOLOGY

## 2023-01-09 PROCEDURE — 99386 PREV VISIT NEW AGE 40-64: CPT | Mod: 25 | Performed by: INTERNAL MEDICINE

## 2023-01-09 PROCEDURE — H2035 A/D TX PROGRAM, PER HOUR: HCPCS | Mod: HQ

## 2023-01-09 PROCEDURE — 90636 HEP A/HEP B VACC ADULT IM: CPT | Performed by: INTERNAL MEDICINE

## 2023-01-09 PROCEDURE — 80354 DRUG SCREENING FENTANYL: CPT

## 2023-01-09 PROCEDURE — 83550 IRON BINDING TEST: CPT | Performed by: PATHOLOGY

## 2023-01-09 PROCEDURE — 99214 OFFICE O/P EST MOD 30 MIN: CPT | Mod: 25 | Performed by: INTERNAL MEDICINE

## 2023-01-09 PROCEDURE — 90686 IIV4 VACC NO PRSV 0.5 ML IM: CPT | Performed by: INTERNAL MEDICINE

## 2023-01-09 PROCEDURE — 82728 ASSAY OF FERRITIN: CPT | Mod: 90 | Performed by: PATHOLOGY

## 2023-01-09 PROCEDURE — 86803 HEPATITIS C AB TEST: CPT | Mod: 90 | Performed by: PATHOLOGY

## 2023-01-09 PROCEDURE — 1002N00001 HC LODGING PLUS FACILITY CHARGE ADULT

## 2023-01-09 RX ORDER — QUETIAPINE FUMARATE 300 MG/1
300 TABLET, FILM COATED ORAL DAILY
Qty: 30 TABLET | Refills: 0 | Status: SHIPPED | OUTPATIENT
Start: 2023-01-09 | End: 2023-01-30

## 2023-01-09 RX ORDER — QUETIAPINE FUMARATE 300 MG/1
300 TABLET, FILM COATED ORAL DAILY
COMMUNITY
Start: 2022-11-17 | End: 2023-01-09

## 2023-01-09 RX ORDER — CLONIDINE HYDROCHLORIDE 0.1 MG/1
0.1 TABLET ORAL 2 TIMES DAILY
Qty: 180 TABLET | Refills: 3 | Status: SHIPPED | OUTPATIENT
Start: 2023-01-09 | End: 2023-06-14

## 2023-01-09 RX ORDER — BUPROPION HYDROCHLORIDE 150 MG/1
150 TABLET ORAL EVERY MORNING
Qty: 30 TABLET | Refills: 0 | Status: SHIPPED | OUTPATIENT
Start: 2023-01-09 | End: 2023-01-30

## 2023-01-09 RX ORDER — CLONIDINE HYDROCHLORIDE 0.1 MG/1
0.1 TABLET ORAL 2 TIMES DAILY
COMMUNITY
End: 2023-01-09

## 2023-01-09 ASSESSMENT — ANXIETY QUESTIONNAIRES
7. FEELING AFRAID AS IF SOMETHING AWFUL MIGHT HAPPEN: MORE THAN HALF THE DAYS
5. BEING SO RESTLESS THAT IT IS HARD TO SIT STILL: MORE THAN HALF THE DAYS
IF YOU CHECKED OFF ANY PROBLEMS ON THIS QUESTIONNAIRE, HOW DIFFICULT HAVE THESE PROBLEMS MADE IT FOR YOU TO DO YOUR WORK, TAKE CARE OF THINGS AT HOME, OR GET ALONG WITH OTHER PEOPLE: VERY DIFFICULT
GAD7 TOTAL SCORE: 13
1. FEELING NERVOUS, ANXIOUS, OR ON EDGE: MORE THAN HALF THE DAYS
GAD7 TOTAL SCORE: 13
3. WORRYING TOO MUCH ABOUT DIFFERENT THINGS: MORE THAN HALF THE DAYS
6. BECOMING EASILY ANNOYED OR IRRITABLE: SEVERAL DAYS
2. NOT BEING ABLE TO STOP OR CONTROL WORRYING: MORE THAN HALF THE DAYS

## 2023-01-09 ASSESSMENT — PATIENT HEALTH QUESTIONNAIRE - PHQ9
5. POOR APPETITE OR OVEREATING: MORE THAN HALF THE DAYS
SUM OF ALL RESPONSES TO PHQ QUESTIONS 1-9: 13

## 2023-01-09 NOTE — TELEPHONE ENCOUNTER
[facility] Call Center    Phone Message    May a detailed message be left on voicemail: yes     Reason for Call: Other: Tamela calling to speak with the care team to ask for the patient's whereabouts the patient has a lab appointment scheduled for 12:30pm. However, the 12:30pm Time slot was changed to a later time without reasoning besides error please follow up on Tamela's concerns thank you.     Action Taken: Message routed to:  [facility]    Travel Screening: Not Applicable                                                                       Therapy

## 2023-01-09 NOTE — TREATMENT PLAN
[facility] Weekly Treatment Plan Review    Date span:  2023-2023    Patient did not have any absences during this time period.      Weekly Treatment Plan Review     Treatment Plan initiated on: 2023.    Dimension1: Acute Intoxication/Withdrawal Potential -   Previous Dimension Ratin  Current Dimension Ratin  Date of Last Use: 2023  Any reports of withdrawal symptoms - No      Dimension 2: Biomedical Conditions & Complications -   Previous Dimension Ratin  Current Dimension Ratin  Medical Concerns:  Client reports having a low iron level.  Current Medications & Medication Changes:  Current Outpatient Medications   Medication     acetaminophen (TYLENOL) 325 MG tablet     alum & mag hydroxide-simethicone (MAALOX) 200-200-20 MG/5ML SUSP suspension     benzocaine-menthol (CEPACOL) 15-3.6 MG lozenge     buPROPion (WELLBUTRIN XL) 150 MG 24 hr tablet     cloNIDine (CATAPRES) 0.1 MG tablet     guaiFENesin (ROBITUSSIN) 20 mg/mL liquid     ibuprofen (ADVIL/MOTRIN) 200 MG tablet     loratadine (CLARITIN) 10 MG tablet     melatonin 3 MG tablet     penicillin V (VEETID) 500 MG tablet     QUEtiapine (SEROQUEL) 300 MG tablet     senna-docusate (SENOKOT-S/PERICOLACE) 8.6-50 MG tablet     No current facility-administered medications for this encounter.     Facility-Administered Medications Ordered in Other Encounters   Medication     Self Administer Medications: Behavioral Services     Medication Prescriber:  [facility]  Taking meds as prescribed? Yes  Medication side effects or concerns:  None  Outside medical appointments this week (list provider and reason for visit): None      Pt reported no physical limitations or medical concerns identified which would interfere with full program participation. Pt reported having a PCC at [facility] in [place], [place]. Pt denied having a PCP there. Pt reported dental concerns (chipped teeth), multiple head injuries, and low  "iron levels.         Dimension 3: Emotional/Behavioral Conditions & Complications -   Previous Dimension Ratin  Current Dimension Ratin  PHQ2:   PHQ-2 (  [employer]) 2023   Q1: Little interest or pleasure in doing things 1   Q2: Feeling down, depressed or hopeless 0   PHQ-2 Score 1      GAD2:   GALA-2 2023   Feeling nervous, anxious, or on edge 1   Not being able to stop or control worrying 1   GALA-2 Total Score 2       Mental health diagnosis: Depression and Anxiety  Date of last SIB:  N/A  Date of  last SI:  N/A  Date of last HI: N/A  Behavioral Targets:  Stabilize and maintain mental health  Risk factors:  homelessness, Poor coping skills, significant history of trauma and/or abuse, grief/loss,   Protective factors:  forward/future oriented thinking, effective problem-solving skills and access to a variety of clinical interventions  Current MH Assignments:  \"Anxiety Disorder and Substance Abuse\"- ; \"Depression and Substance Abuse\"- ;   \"Grief and Loss\"-    Narrative:  Current Mental Health symptoms include: Patient denies. Upon admission, Pt reported that difficult emotions and impulsive behaviors are his main triggers for relapse. Pt reported having been prescribed medications for MH but does not currently have them. Pt lacks follow through of his relapse and coping skills in emotional regulation. Pt's suicide risk rating on 2022 assessment is \"Very Low Risk.\" Pt reported that he was open to trying individual therapy at .    Dimension 4: Treatment Acceptance / Resistance -   Previous Dimension Ratin  Current Dimension Ratin  ROMMEL Diagnosis:  304.30 (F12.20) Cannabis Use Disorder Severe  In early remission,   Opioid Use Disorder, Specify if:  In a controlled environment with a severity of:  304.00 (F11.20) Severe  Commitment to tx process/Stage of change- Contemplative  ROMMEL assignments - \"Drug Use History\"- ; \"5 and 5\"- ; \"First Step\"-       Narrative: Pt " "expresses verbal motivation to make lifestyle changes. However, his supportive behaviors have lacked both follow through and commitment. Pt continues to use despite serious consequences in major life areas. He presents as in the contemplative stage. Pt reported that his main motivation for tx at this time is to \"stay sober, not go back to using, and to better identify with his emotions, that's it.\"        Dimension 5: Relapse / Continued Problem Potential -   Previous Dimension Ratin  Current Dimension Ratin  Relapses this week - None  Urges to use - None  UA results -   Recent Results (from the past 168 hour(s))   Fentanyl Qualitative with Reflex to Quant Urine    Collection Time: 23  3:52 PM   Result Value Ref Range    Fentanyl Qual Urine Screen Positive (A) Screen Negative   Asymptomatic COVID-19 Virus (Coronavirus) by PCR Nose    Collection Time: 23  8:50 AM    Specimen: Nose; Swab   Result Value Ref Range    SARS CoV2 PCR Negative Negative   Fentanyl Qualitative with Reflex to Quant Urine    Collection Time: 23  1:12 PM   Result Value Ref Range    Fentanyl Qual Urine Screen Positive (A) Screen Negative   Fentanyl and Metabolite Quantitative, Urine    Collection Time: 23  1:12 PM   Result Value Ref Range    Fentanyl, Urn, Quant 4.2 ng/mL    Norfentanyl, Urn, Quant 18.7 ng/mL   Asymptomatic COVID-19 Virus (Coronavirus) by PCR Nose    Collection Time: 23  7:14 AM    Specimen: Nose; Swab   Result Value Ref Range    SARS CoV2 PCR Negative Negative         Narrative- Upon admission, Pt reported roughly 50 lifetime arrests with the first being at age 9. Pt reported currently being on probation in [place], [place]. Pt reported attending multiple previous treatment episodes. Despite serious consequences and numerous interventions, his longest period of sobriety is three years. Pt reported previous 12-Step meeting attendance, but stated that \"he doesn't like 12-Step.\" Pt reported " "that meditation, his spirituality, and when he implements his coping skills help him maintain his sobriety. He has limited insight into his personal relapse cues and effective prevention strategies.     Dimension 6: Recovery Environment -   Previous Dimension Ratin  Current Dimension Ratin  Family supportive of treatment?  Yes, parents, siblings, wife, and kids      Community support group attendance - Weekly 12 step attendance  Recreational activities - Patient denied    Narrative - Pt reported that he does not currently have a job and that \"drug court made him give up his previous job.\" Pt reported that he lacks a sober support network other than for his \"parents, siblings, and wife.\" Pt reported having six children (23 son, 22 son, 20 son, 14 son, 15 daughter, and baby daughter ( ).\" Pt lives with his wife in an apartment. Pt acknowledged that he would most likely have to attend an IOP after discharge from + due to drug court.     Progress made on transition planning goals: Ongoing    Justification for Continued Treatment at this Level of Care:  Patient remains at a high risk for return to use of alcohol due to limited time in treatment to learn about her triggers and cues to use and develop effective coping strategies to avert return to use.   Treatment coordination activities this week:  Patient is in initial stage sof exploring aftercare treatment.  Need for peer recovery support referral? No    Discharge Planning:  Target Discharge Date/Timeframe:?2023   Med Mgmt Provider/Appt:??TBD   Ind therapy Provider/Appt:??TBD ?  Family therapy Provider/Appt:??TBD   Other referrals:??TBD     Has vulnerable adult status change? No    Interdisciplinary Clinical Supervision including: LADC and RN    Are Treatment Plan goals/objectives effective? Yes  *If no, list changes to treatment plan:    Are the current goals meeting client's needs? Yes  *If no, list the changes to treatment " plan.    Client Input / Response: Patient contributed to treatment plan review.     *Client agrees with any changes to the treatment plan: N/A  *Client received copy of changes: N/A  *Client is aware of right to access a treatment plan review: Yes

## 2023-01-09 NOTE — GROUP NOTE
Group Therapy Documentation    PATIENT'S NAME: Ajith Delvalle  MRN:   [id number]  :   1975  ACCT. NUMBER: [id number]  DATE OF SERVICE: 23  START TIME: 12:30 PM  END TIME:  2:30 PM  FACILITATOR(S): Arben Pompa LADC; Shelby Tilley  TOPIC: BEH Group Therapy  Number of patients attending the group:  6  Group Length:  2 Hours    Group Therapy Type: Health and wellbeing     Summary of Group / Topics Discussed:    Spiritual Care      Group Attendance:  Excused from group session    Patient's response to the group topic/interactions:  Excused from group session.    Patient appeared to be Excused from group session.        Client specific details:  Pt was excused from spiritual group due to a medical appointment.

## 2023-01-09 NOTE — TELEPHONE ENCOUNTER
[facility] The staff I spoke with stated they found the patient prior to the call back. The patient has been waiting in the lab lobby for their appointment.     Informed them the patient was scheduled by me for a 12:30 pm lab appointment after seeing Dr. Clarke today at 11:30 am. However I made a mistake and scheduled the appointment for the incorrect date. A new appointment was scheduled by lab staff for 3:15 pm. The error was unnoticed by me until this encounter.     Don Dean, EMT at 2:51 PM on 1/9/2023.  Primary Care Clinic: [phone]

## 2023-01-09 NOTE — GROUP NOTE
Group Therapy Documentation    PATIENT'S NAME: Ajith Delvalle  MRN:   [id number]  :   1975  ACCT. NUMBER: [id number]  DATE OF SERVICE: 23  START TIME:  9:00 AM  END TIME: 11:00 AM  FACILITATOR(S): Christina Ovalles LADC  TOPIC: BEH Group Therapy  Number of patients attending the group:  6  Group Length:  2 Hours    Group Therapy Type: Recovery strategies    Summary of Group / Topics Discussed:    Recovery Principles, Disease of addiction, and Relapse prevention    Patients completed daily check ins and the question of the day.  Patients presented assignments and shared feedback.  Patients discussed readings and assignments presented.   Patients engaged in reading and processing daily meditations.       Group Attendance:  Attended group session    Patient's response to the group topic/interactions:  cooperative with task    Patient appeared to be Actively participating.        Client specific details:  Ajith  attended AM small group therapy. Patient participated and remained engaged throughout group.

## 2023-01-09 NOTE — PROGRESS NOTES
Name: Ajith Delvalle  Date: 1/9/2023  Medical Record: [id number]    Envelope Number: [id number]    List of Contents (List each item separately in new row):   Penicillin V Potassium 500MG  Clonidine HCL 0.1MG   Admission:  I am responsible for any personal items that are not sent to the safe or pharmacy.  [facility] is not responsible for loss, theft or damage of any property in my possession.  Patient Signature:  ___________________________________________       Date/Time:__________________________    Staff Signature: __________________________________       Date/Time:__________________________    2nd Staff person, if patient is unable/unwilling to sign:      __________________________________________________________       Date/Time: __________________________  Discharge:  [facility] has returned all of my personal belongings:    Patient Signature: ________________________________________     Date/Time: ____________________________________    Staff Signature: ______________________________________     Date/Time:_____________________________________

## 2023-01-09 NOTE — NURSING NOTE
Ajith Delvalle is a 47 year old male patient that presents today in clinic for the following:    Chief Complaint   Patient presents with     Establish Care     Physical     The patient's allergies and medications were reviewed as noted. A set of vitals were recorded as noted without incident. The patient does not have any other questions for the provider.    Giancarlo Ramos, EMT at 11:10 AM on 1/9/2023

## 2023-01-10 ENCOUNTER — HOSPITAL ENCOUNTER (OUTPATIENT)
Dept: BEHAVIORAL HEALTH | Facility: CLINIC | Age: 48
Discharge: HOME | End: 2023-01-10
Attending: FAMILY MEDICINE
Payer: COMMERCIAL

## 2023-01-10 PROBLEM — E11.9 DIABETES MELLITUS, TYPE 2 (H): Status: ACTIVE | Noted: 2023-01-10

## 2023-01-10 PROCEDURE — 1002N00001 HC LODGING PLUS FACILITY CHARGE ADULT

## 2023-01-10 PROCEDURE — H2035 A/D TX PROGRAM, PER HOUR: HCPCS | Mod: HQ

## 2023-01-10 RX ORDER — FERROUS SULFATE 325(65) MG
325 TABLET ORAL
Qty: 90 TABLET | Refills: 3 | Status: SHIPPED | OUTPATIENT
Start: 2023-01-10 | End: 2023-06-22

## 2023-01-11 ENCOUNTER — HOSPITAL ENCOUNTER (OUTPATIENT)
Dept: BEHAVIORAL HEALTH | Facility: CLINIC | Age: 48
Discharge: HOME | End: 2023-01-11
Attending: FAMILY MEDICINE
Payer: COMMERCIAL

## 2023-01-11 LAB
FENTANYL UR-MCNC: 1.2 NG/ML
NORFENTANYL UR-MCNC: 14.4 NG/ML

## 2023-01-11 PROCEDURE — 1002N00001 HC LODGING PLUS FACILITY CHARGE ADULT

## 2023-01-11 PROCEDURE — H2035 A/D TX PROGRAM, PER HOUR: HCPCS | Mod: HQ

## 2023-01-11 NOTE — GROUP NOTE
Psychoeducation Group Documentation    PATIENT'S NAME: Ajith Delvalle  MRN:   [id number]  :   1975  ACCT. NUMBER: [id number]  DATE OF SERVICE: 23  START TIME:  8:30 AM  END TIME:  9:30 AM  FACILITATOR(S): Bernardino Larson LADC; Jose Alberto Ramos LADC; Betty Ashford  TOPIC: BEH Pyschoeducation  Number of patients attending the group:  7  Group Length:  1 Hours    Skills Group Therapy Type: Recovery skills    Summary of Group / Topics Discussed:    Balanced lifestyle skills          Group Attendance:  Attended group session    Patient's response to the group topic/interactions:  cooperative with task    Patient appeared to be Attentive.         Client specific details:  Ajith gave appropriate feedback..

## 2023-01-11 NOTE — GROUP NOTE
Group Therapy Documentation    PATIENT'S NAME: Ajith Delvalle  MRN:   [id number]  :   1975  ACCT. NUMBER: [id number]  DATE OF SERVICE: 23  START TIME: 12:30 PM  END TIME:  2:30 PM  FACILITATOR(S): Arben Pompa LADC  TOPIC: BEH Group Therapy  Number of patients attending the group:  7  Group Length:  2 Hours    Group Therapy Type: Emotion processing    Summary of Group / Topics Discussed:    Sober coping skills      Group Attendance:  Attended group session    Patient's response to the group topic/interactions:  cooperative with task    Patient appeared to be Actively participating.        Client specific details:  Ajith participated and interacted appropriately with peers and staff in PM group. No triggers to use noted or discussed.

## 2023-01-11 NOTE — PROGRESS NOTES
In light of laboratory results of quantitative Fentanyl and Norfentanyl from 1/3/23-1/4/23, [facility] Medical Director was consulted.  It was decided that relevant numbers likely reflect some sort of aberration, and are not likely indicative of current illicit substance use. Treatment team updated accordingly as to above.  Will continue to monitor as prudent, per protocol.

## 2023-01-11 NOTE — GROUP NOTE
Group Therapy Documentation    PATIENT'S NAME: Ajith Delvalle  MRN:   [id number]  :   1975  ACCT. NUMBER: [id number]  DATE OF SERVICE: 23  START TIME:  9:40 AM  END TIME: 11:30 AM  FACILITATOR(S): Destin Mccormack; Christina Ovalles LADC  TOPIC: BEH Group Therapy  Number of patients attending the group:  7  Group Length:  2 Hours    Group Therapy Type: Recovery strategies and Emotion processing    Summary of Group / Topics Discussed:    Recovery Principles, Sober coping skills, Disease of addiction, and Emotions/expression      Group Attendance:  Attended group session    Patient's response to the group topic/interactions:  cooperative with task, discussed personal experience with topic and expressed understanding of topic    Patient appeared to be Actively participating, Attentive and Engaged.        Client specific details:   Ajith attended AM small group therapy. Patient participated and remained engaged throughout group.

## 2023-01-12 ENCOUNTER — HOSPITAL ENCOUNTER (OUTPATIENT)
Dept: BEHAVIORAL HEALTH | Facility: CLINIC | Age: 48
Discharge: HOME | End: 2023-01-12
Attending: FAMILY MEDICINE
Payer: COMMERCIAL

## 2023-01-12 PROCEDURE — H2035 A/D TX PROGRAM, PER HOUR: HCPCS | Mod: HQ

## 2023-01-12 PROCEDURE — 1002N00001 HC LODGING PLUS FACILITY CHARGE ADULT

## 2023-01-12 PROCEDURE — H2035 A/D TX PROGRAM, PER HOUR: HCPCS

## 2023-01-12 NOTE — PROGRESS NOTES
"D: Writer met with patient from 12:30 pm to1:20 pm for an individual psychotherapy session. The patient is currently admitted to the [facility] program. Pt shared he is feeling upset as he was advised to share a room with another pt. He reported he doesn't mind having a roommate, just the particular person he is rooming with he doesn't get along with. He reported he is also upset he wasn't given enough notice. Pt reported he is involved with drug court and other legal issues and really doesn't want this to affect his treatment. Pt asked about the Fentanyl levels in his urine test, as he was advised his admit urine was positive for Fentanyl. He reported not using but \"being around it\" either cutting it or bagging it up. Writer discussed reasons for being sober and changing his lifestyle. Pt reported he wants to be a better father for his children.     I: Writer offered support, compassion, and validation to patient. Writer emphasized using a sponsor, expanding their sober support network, attending 12 step meetings, performing daily rituals, and setting goals as these are beneficial to a recovery program. Writer offered to discuss roommate situation with counselors but did not promise any change in the situation.     A: Pt appears to be perseverating on his Fentanyl test and upcoming court dates. He appears to be in the contemplative stage of change. He appeared angry and upset when discussing the roommate changes.     P: Writer will meet with pt on 1/19/2023 for a follow up individual session.     "

## 2023-01-12 NOTE — GROUP NOTE
Group Therapy Documentation    PATIENT'S NAME: Ajith Delvalle  MRN:   [id number]  :   1975  ACCT. NUMBER: [id number]  DATE OF SERVICE: 23  START TIME:  3:01 PM  END TIME:  4:01 PM  FACILITATOR(S): Christina Ovalles [facility]; Betty Ashford; Eva Verma [facility]; Zaki Benton [facility]  TOPIC: BEH Group Therapy  Number of patients attending the group:  22    Group Therapy Type: Recovery strategies    Summary of Group / Topics Discussed:    Sober coping skills, Emotions/expression, and Relapse prevention      Group Attendance:  Attended group session    Patient's response to the group topic/interactions:  cooperative with task    Patient appeared to be Actively participating.        Client specific details:  Ajith attended PM skills group. Patient engaged in discussion and participated in sharing feedback to his peers.

## 2023-01-12 NOTE — GROUP NOTE
"Group Therapy Documentation    PATIENT'S NAME: Ajith Delvalle  MRN:   [id number]  :   1975  ACCT. NUMBER: [id number]  DATE OF SERVICE: 23  START TIME: 12:30 PM  END TIME:  2:30 PM  FACILITATOR(S): Zaki Benton LADC  TOPIC: BEH Group Therapy  Number of patients attending the group:  7  Group Length:  2 Hours    Group Therapy Type: Recovery strategies, Emotion processing, and Assignment presentation.    Summary of Group / Topics Discussed:    Recovery Principles, Cognitive behavioral therapy skills, Disease of addiction, Emotions/expression, and Relapse prevention      Group Attendance:  Attended group session    Patient's response to the group topic/interactions:  cooperative with task    Patient appeared to be Engaged.        Client specific details:  Ajith, discussed needs and resources that will benefit their recovery journey. Each patient had an opportunity to process and provide constructive feedback to peers who shared and with peer who presented his assignment on \"Denial and consequences\".       "

## 2023-01-12 NOTE — GROUP NOTE
Group Therapy Documentation    PATIENT'S NAME: Ajith Delvalle  MRN:   [id number]  :   1975  ACCT. NUMBER: [id number]  DATE OF SERVICE: 23  START TIME:  9:00 AM  END TIME: 11:00 AM  FACILITATOR(S): Jose Alberto Ramos LADC  TOPIC: BEH Group Therapy  Number of patients attending the group:  7  Group Length:  2 Hours    Group Therapy Type: Recovery strategies and Daily living/independence skills    Summary of Group / Topics Discussed:    Sober coping skills, Relationship/socialization, Balanced lifestyle, and Leisure explorations/use of leisure time    Group discussion began with check-ins, followed by a reflection reading regarding stress. Group discussion then included the topics of boundaries and communication, followed by things people need to live a full/healthy life. Group participants provided feedback to each other throughout group session.       Group Attendance:  Attended group session    Patient's response to the group topic/interactions:  cooperative with task    Patient appeared to be Actively participating, Attentive and Engaged.        Client specific details: Patient actively engaged in group discussion. Patient noted finding assertive communication important when setting/maintaining boundaries.

## 2023-01-13 ENCOUNTER — HOSPITAL ENCOUNTER (OUTPATIENT)
Dept: BEHAVIORAL HEALTH | Facility: CLINIC | Age: 48
Discharge: HOME | End: 2023-01-13
Attending: FAMILY MEDICINE
Payer: COMMERCIAL

## 2023-01-13 LAB
FENTANYL UR-MCNC: <1 NG/ML
NORFENTANYL UR-MCNC: 5.7 NG/ML

## 2023-01-13 PROCEDURE — H2035 A/D TX PROGRAM, PER HOUR: HCPCS | Mod: HQ

## 2023-01-13 PROCEDURE — 1002N00001 HC LODGING PLUS FACILITY CHARGE ADULT

## 2023-01-13 NOTE — GROUP NOTE
"Group Therapy Documentation    PATIENT'S NAME: Ajith Delvalle  MRN:   [id number]  :   1975  ACCT. NUMBER: [id number]  DATE OF SERVICE: 23  START TIME:  9:00 AM  END TIME: 11:00 AM  FACILITATOR(S): Zaki Benton LADC; Jose Alberto Ramos LADC  TOPIC: BEH Group Therapy  Number of patients attending the group:  6  Group Length:  2 Hours    Group Therapy Type: Recovery strategies    Summary of Group / Topics Discussed:    Sober coping skills and Relapse prevention    Group discussion began with check-ins, followed by patients asking open-questions about random topics. Group also included a presentation of a participants \"Relapse Triggers\" assignment. Group participants provided each other with feedback throughout group session.       Group Attendance:  Attended group session    Patient's response to the group topic/interactions:  cooperative with task    Patient appeared to be Actively participating, Attentive and Engaged.        Client specific details:  Patient was present for group, however was reserved to engage in discussion.       "

## 2023-01-13 NOTE — PROGRESS NOTES
Spoke with pt about starting bupropion Rx, pt said he initially didn't start the meds because he forgot , but has now started and things are going well, no reported side effects or concerns

## 2023-01-13 NOTE — GROUP NOTE
Group Therapy Documentation    PATIENT'S NAME: Ajith Delvalle  MRN:   [id number]  :   1975  ACCT. NUMBER: [id number]  DATE OF SERVICE: 23  START TIME: 12:30 PM  END TIME:  2:30 PM  FACILITATOR(S): Christina Ovalles LADC  TOPIC: BEH Group Therapy  Number of patients attending the group:  7    Group Length:  2 Hours    Group Therapy Type: Recovery strategies    Summary of Group / Topics Discussed:    Recovery Principles    Patients viewed an addiction/mental health based film. This film addressed: addiction as a disease, relationships and addiction, engagement in AA, and evaluating priorities when getting sober.   Patients engaged in a thorough discussion about the film, and shared personal experiences, and how the film helped them process their own life events.         Group Attendance:  Attended group session    Patient's response to the group topic/interactions:  cooperative with task    Patient appeared to be Actively participating.        Client specific details:  Ajith attended PM group therapy. Patient engaged in discussion and participated in sharing feedback to his peers.

## 2023-01-14 ENCOUNTER — HOSPITAL ENCOUNTER (OUTPATIENT)
Dept: BEHAVIORAL HEALTH | Facility: CLINIC | Age: 48
Discharge: HOME | End: 2023-01-14
Attending: FAMILY MEDICINE
Payer: COMMERCIAL

## 2023-01-14 PROCEDURE — H2035 A/D TX PROGRAM, PER HOUR: HCPCS | Mod: HQ

## 2023-01-14 PROCEDURE — 1002N00001 HC LODGING PLUS FACILITY CHARGE ADULT

## 2023-01-14 NOTE — GROUP NOTE
Psychoeducation Group Documentation    PATIENT'S NAME: Ajith Delvalle  MRN:   [id number]  :   1975  ACCT. NUMBER: [id number]  DATE OF SERVICE: 23  START TIME: 12:30 PM  END TIME:  2:30 PM  FACILITATOR(S): Bernardino Larson LADC; Christina Ovalles LADC; Abhi Troncoso LADC  TOPIC: BEH Pyschoeducation  Number of patients attending the group:  7  Group Length:  2 Hours    Skills Group Therapy Type: Recovery skills    Summary of Group / Topics Discussed:    Relapse prevention skills          Group Attendance:  Attended group session    Patient's response to the group topic/interactions:  cooperative with task    Patient appeared to be Attentive.         Client specific details:  Ajith gave appropriate feedback..

## 2023-01-14 NOTE — GROUP NOTE
Group Therapy Documentation    PATIENT'S NAME: Ajith Delvalle  MRN:   [id number]  :   1975  ACCT. NUMBER: [id number]  DATE OF SERVICE: 23  START TIME:  9:00 AM  END TIME: 11:00 AM  FACILITATOR(S): Bernardino Larson LADC; Christina Ovalles LADC; Abhi Troncoso LADC  TOPIC: BEH Group Therapy  Number of patients attending the group:  7  Group Length:  2 Hours    Group Therapy Type: Recovery strategies    Summary of Group / Topics Discussed:    Relapse prevention      Group Attendance:  Attended group session    Patient's response to the group topic/interactions:  cooperative with task    Patient appeared to be Engaged.        Client specific details:  Ajith gave appropriate feedback..

## 2023-01-15 ENCOUNTER — HOSPITAL ENCOUNTER (OUTPATIENT)
Dept: BEHAVIORAL HEALTH | Facility: CLINIC | Age: 48
Discharge: HOME | End: 2023-01-15
Attending: FAMILY MEDICINE
Payer: COMMERCIAL

## 2023-01-15 PROCEDURE — H2035 A/D TX PROGRAM, PER HOUR: HCPCS | Mod: HQ

## 2023-01-15 PROCEDURE — 1002N00001 HC LODGING PLUS FACILITY CHARGE ADULT

## 2023-01-15 NOTE — GROUP NOTE
Psychoeducation Group Documentation    PATIENT'S NAME: Ajith Delvalle  MRN:   [id number]  :   1975  ACCT. NUMBER: [id number]  DATE OF SERVICE: 1/15/23  START TIME: 12:30 PM  END TIME:  1:30 PM  FACILITATOR(S): Bernardino Larson LADC; Stefany Steele LADC  TOPIC: BEH Pyschoeducation  Number of patients attending the group:  27  Group Length:  1 Hours    Skills Group Therapy Type: Healthy behaviors development    Summary of Group / Topics Discussed:    Relationship/social skills          Group Attendance:  Attended group session    Patient's response to the group topic/interactions:  cooperative with task    Patient appeared to be Attentive.         Client specific details:  Ajith gave appropriate feedback..

## 2023-01-15 NOTE — GROUP NOTE
Psychoeducation Group Documentation    PATIENT'S NAME: Ajith Delvalle  MRN:   [id number]  :   1975  ACCT. NUMBER: [id number]  DATE OF SERVICE: 1/15/23  START TIME:  8:40 AM  END TIME: 10:30 AM  FACILITATOR(S): Bernardino Larson LADC; Stefany Steele LADC; Isaura Carroll RN  TOPIC: BEH Pyschoeducation  Number of patients attending the group:  7  Group Length:  2 Hours    Skills Group Therapy Type: Recovery skills    Summary of Group / Topics Discussed:    Balanced lifestyle skills          Group Attendance:  Attended group session    Patient's response to the group topic/interactions:  cooperative with task    Patient appeared to be Attentive.         Client specific details:  Ajith gave appropriate feedback..

## 2023-01-16 ENCOUNTER — HOSPITAL ENCOUNTER (OUTPATIENT)
Dept: BEHAVIORAL HEALTH | Facility: CLINIC | Age: 48
Discharge: HOME | End: 2023-01-16
Attending: FAMILY MEDICINE
Payer: COMMERCIAL

## 2023-01-16 DIAGNOSIS — F19.20 CHEMICAL DEPENDENCY (H): ICD-10-CM

## 2023-01-16 LAB — SARS-COV-2 RNA RESP QL NAA+PROBE: NEGATIVE

## 2023-01-16 PROCEDURE — H2035 A/D TX PROGRAM, PER HOUR: HCPCS | Mod: HQ

## 2023-01-16 PROCEDURE — 1002N00001 HC LODGING PLUS FACILITY CHARGE ADULT

## 2023-01-16 PROCEDURE — U0005 INFEC AGEN DETEC AMPLI PROBE: HCPCS

## 2023-01-16 NOTE — GROUP NOTE
Group Therapy Documentation    PATIENT'S NAME: Ajith Delvalle  MRN:   [id number]  :   1975  ACCT. NUMBER: [id number]  DATE OF SERVICE: 23  START TIME: 12:30 PM  END TIME:  2:30 PM  FACILITATOR(S): Jose Alberto Ramos LADC; Shelby Tilley  TOPIC: BEH Group Therapy  Number of patients attending the group:  7  Group Length:  2 Hours    Group Therapy Type: Emotion processing and Health and wellbeing     Summary of Group / Topics Discussed:    Spiritual Care    Group participants discussed topics related to spirituality. Feedback was provided by participants throughout group session.       Group Attendance:  Attended group session    Patient's response to the group topic/interactions:  cooperative with task    Patient appeared to be Actively participating, Attentive and Engaged.        Client specific details:Patient engaged in group discussion.

## 2023-01-16 NOTE — TREATMENT PLAN
[facility] Weekly Treatment Plan Review    Date span:  2023-1/15/2023    Patient did not have any absences during this time period.      Weekly Treatment Plan Review     Treatment Plan initiated on: 2023.    Dimension1: Acute Intoxication/Withdrawal Potential -   Previous Dimension Ratin  Current Dimension Ratin  Date of Last Use: 2023  Any reports of withdrawal symptoms - No      Dimension 2: Biomedical Conditions & Complications -   Previous Dimension Ratin  Current Dimension Ratin  Medical Concerns:  Client reports having a low iron level.  Current Medications & Medication Changes:  Current Outpatient Medications   Medication     acetaminophen (TYLENOL) 325 MG tablet     alum & mag hydroxide-simethicone (MAALOX) 200-200-20 MG/5ML SUSP suspension     benzocaine-menthol (CEPACOL) 15-3.6 MG lozenge     buPROPion (WELLBUTRIN XL) 150 MG 24 hr tablet     cloNIDine (CATAPRES) 0.1 MG tablet     ferrous sulfate (FEROSUL) 325 (65 Fe) MG tablet     guaiFENesin (ROBITUSSIN) 20 mg/mL liquid     ibuprofen (ADVIL/MOTRIN) 200 MG tablet     loratadine (CLARITIN) 10 MG tablet     melatonin 3 MG tablet     penicillin V (VEETID) 500 MG tablet     QUEtiapine (SEROQUEL) 300 MG tablet     senna-docusate (SENOKOT-S/PERICOLACE) 8.6-50 MG tablet     No current facility-administered medications for this encounter.     Facility-Administered Medications Ordered in Other Encounters   Medication     Self Administer Medications: Behavioral Services     Medication Prescriber:  Critical access hospital/ [facility]  Taking meds as prescribed? Yes  Medication side effects or concerns:  None  Outside medical appointments this week (list provider and reason for visit): None      Pt reported no physical limitations or medical concerns identified which would interfere with full program participation. Pt reported having a PCC at [facility] in [place], [place]. Pt denied having a PCP there. Pt reported dental  "concerns (chipped teeth), multiple head injuries, and low iron levels.         Dimension 3: Emotional/Behavioral Conditions & Complications -   Previous Dimension Ratin  Current Dimension Ratin  PHQ2:   PHQ-2 (  [employer]) 2023   Q1: Little interest or pleasure in doing things 1 1   Q2: Feeling down, depressed or hopeless 0 0   PHQ-2 Score 1 1      GAD2:   GALA-2 2023   Feeling nervous, anxious, or on edge 1 1   Not being able to stop or control worrying 1 0   GALA-2 Total Score 2 1       Mental health diagnosis: Depression and Anxiety  Date of last SIB:  N/A  Date of  last SI:  N/A  Date of last HI: N/A  Behavioral Targets:  Stabilize and maintain mental health  Risk factors:  homelessness, Poor coping skills, significant history of trauma and/or abuse, grief/loss,   Protective factors:  forward/future oriented thinking, effective problem-solving skills and access to a variety of clinical interventions  Current MH Assignments: \"Guilt and Shame\" assignment  ; \"Resentments\" assignment     Narrative:  Current Mental Health symptoms include: Patient denies. Patient reported no significant changes in mood during the past week. He reported no change in stress level. Patient reported utilizing meditation and beep breathing to manage stress and difficult emotions.     Dimension 4: Treatment Acceptance / Resistance -   Previous Dimension Ratin  Current Dimension Ratin  ROMMEL Diagnosis:  304.30 (F12.20) Cannabis Use Disorder Severe  In early remission,   Opioid Use Disorder, Specify if:  In a controlled environment with a severity of:  304.00 (F11.20) Severe  Commitment to tx process/Stage of change- Contemplative  ROMMEL assignments - \"Identify Triggers/Cues\" assignment  ; \"Resentment\" assignment        Narrative: Upon admission, Pt continued to use despite serious consequences in major life areas. He presents as in the contemplative stage. Pt reported that his main " "motivation for tx at this time is to \"conequences, be sober\".        Dimension 5: Relapse / Continued Problem Potential -   Previous Dimension Ratin  Current Dimension Ratin  Relapses this week - None  Urges to use - None  UA results -   Recent Results (from the past 168 hour(s))   Asymptomatic COVID-19 Virus (Coronavirus) by PCR Nasopharyngeal    Collection Time: 23  8:32 AM    Specimen: Nasopharyngeal; Swab   Result Value Ref Range    SARS CoV2 PCR Negative Negative         Narrative- Upon admission, Pt reported roughly 50 lifetime arrests with the first being at age 9. Pt reported currently being on probation in [place], [place]. Pt reported attending multiple previous treatment episodes. Despite serious consequences and numerous interventions, his longest period of sobriety is three years. Pt reported previous 12-Step meeting attendance, but stated that \"he doesn't like 12-Step.\" Pt reported that meditation, his spirituality, and when he implements his coping skills help him maintain his sobriety. He has limited insight into his personal relapse cues and effective prevention strategies.     Dimension 6: Recovery Environment -   Previous Dimension Ratin  Current Dimension Ratin  Family supportive of treatment?  Yes, parents, siblings, wife, and kids      Community support group attendance - Weekly 12 step attendance  Recreational activities - Patient denied    Narrative - Pt reported that he does not currently have a job and that \"drug court made him give up his previous job.\" Pt reported that he lacks a sober support network other than for his \"parents, siblings, and wife.\" Pt reported having six children (23 son, 22 son, 20 son, 14 son, 15 daughter, and baby daughter ( ).\" Pt lives with his wife in an apartment. Pt acknowledged that he would most likely have to attend an IOP after discharge from + due to drug court. Patient reported plans to obtain sober housing and " attend an IOP program.     Progress made on transition planning goals: Ongoing    Justification for Continued Treatment at this Level of Care:  Patient remains at a high risk for return to use of alcohol due to limited time in treatment to learn about her triggers and cues to use and develop effective coping strategies to avert return to use.   Treatment coordination activities this week:  Patient is in initial stage sof exploring aftercare treatment.  Need for peer recovery support referral? No    Discharge Planning:  Target Discharge Date/Timeframe:?1/30/2023   Med Mgmt Provider/Appt:??TBD   Ind therapy Provider/Appt:??TBD ?  Family therapy Provider/Appt:??TBD   Other referrals:??TBD     Has vulnerable adult status change? No    Interdisciplinary Clinical Supervision including: LADC and RN    Are Treatment Plan goals/objectives effective? Yes  *If no, list changes to treatment plan:    Are the current goals meeting client's needs? Yes  *If no, list the changes to treatment plan.    Client Input / Response: Patient contributed to treatment plan review.     *Client agrees with any changes to the treatment plan: N/A  *Client received copy of changes: N/A  *Client is aware of right to access a treatment plan review: Yes

## 2023-01-17 ENCOUNTER — HOSPITAL ENCOUNTER (OUTPATIENT)
Dept: BEHAVIORAL HEALTH | Facility: CLINIC | Age: 48
Discharge: HOME | End: 2023-01-17
Attending: FAMILY MEDICINE
Payer: COMMERCIAL

## 2023-01-17 PROCEDURE — 1002N00001 HC LODGING PLUS FACILITY CHARGE ADULT

## 2023-01-17 PROCEDURE — H2035 A/D TX PROGRAM, PER HOUR: HCPCS | Mod: HQ

## 2023-01-17 NOTE — PROGRESS NOTES
[facility]  [address] [place] [id number]              Ajith Delvalle, 1975 was admitted for treatment of chemical dependency at [facility] on     Ajith is  currently participating in:         __X___ [facility]           Ajith is scheduled to engage in treatment for 28 days. Discharge Date of 1/30/23.   Ajith is unable to attend court on 1/18/23 due to his current engagmeent in residetntial treatment.   He will not be able to attend any outside medical, legal, or personal events until his treatment has been completed.    Out treatment Curriculum is abstinence/12-Step Focused. Our purpose is to help patients achieve a sober-balenced lifestyle with lectures, group therapy, daily meetings, community speakers, and skill building.     Ajith has been assigned to primary counselors STEVE Banuelos & STEVE Liu.  If you have any further questions, please feel free to contact us. ([phone])     Respectfully,     Christina Ovalles, NATHAN, STEVE

## 2023-01-17 NOTE — GROUP NOTE
Group Therapy Documentation    PATIENT'S NAME: Ajith Delvalle  MRN:   [id number]  :   1975  ACCT. NUMBER: [id number]  DATE OF SERVICE: 23  START TIME: 12:30 PM  END TIME:  2:30 PM  FACILITATOR(S): Arben Pompa LADC  TOPIC: BEH Group Therapy  Number of patients attending the group:  7  Group Length:  2 Hours    Group Therapy Type: Recovery strategies    Summary of Group / Topics Discussed:    Recovery Principles      Group Attendance:  Attended group session    Patient's response to the group topic/interactions:  cooperative with task    Patient appeared to be Actively participating.        Client specific details:  Ajith participated and interacted appropriately with peers and staff in PM group. No triggers to use noted or discussed.

## 2023-01-17 NOTE — GROUP NOTE
"Group Therapy Documentation    PATIENT'S NAME: Ajith Delvalle  MRN:   [id number]  :   1975  ACCT. NUMBER: [id number]  DATE OF SERVICE: 23  START TIME:  9:00 AM  END TIME: 11:00 AM  FACILITATOR(S): Jose Alberto Ramos LADC  TOPIC: BEH Group Therapy  Number of patients attending the group:  7  Group Length:  2 Hours    Group Therapy Type: Recovery strategies    Summary of Group / Topics Discussed:    Sober coping skills and Relapse prevention    Group participated in check-ins , followed by a reflection reading regarding change. Two assignment were then shared by group participants, followed by feedback from others.       Group Attendance:  Attended group session    Patient's response to the group topic/interactions:  cooperative with task    Patient appeared to be Actively participating, Attentive and Engaged.        Client specific details:  Patient engaged in group discussion. Patient shared his \"Drug History\" Assignment.      "

## 2023-01-17 NOTE — GROUP NOTE
Psychoeducation Group Documentation    PATIENT'S NAME: Ajith Delvalle  MRN:   [id number]  :   1975  ACCT. NUMBER: [id number]  DATE OF SERVICE: 23  START TIME:  3:00 PM  END TIME:  4:00 PM  FACILITATOR(S): Stefany Steele LADC; Betty Ashford; Eva Verma LADC  TOPIC: BEH Pyschoeducation  Number of patients attending the group:  24  Group Length:  1 hour    Skills Group Therapy Type: Healthy behaviors development    Summary of Group / Topics Discussed:    Balanced lifestyle skills          Group Attendance:  Attended group session    Patient's response to the group topic/interactions:  cooperative with task    Patient appeared to be Engaged.         Client specific details: Pt was appropriate and attentive in PM skills group, during gratitude lecture.

## 2023-01-17 NOTE — PROGRESS NOTES
Name: Ajith Delvalle  Date: 1/17/2023  Medical Record: [id number]    Envelope Number: [id number]    List of Contents (List each item separately in new row):   Clonidine HCL 0.1 mg  Admission:  I am responsible for any personal items that are not sent to the safe or pharmacy.  [facility] is not responsible for loss, theft or damage of any property in my possession.    Patient Signature:  ___________________________________________       Date/Time:__________________________    Staff Signature: __________________________________       Date/Time:__________________________    2nd Staff person, if patient is unable/unwilling to sign:      __________________________________________________________       Date/Time: __________________________    Discharge:  [facility] has returned all of my personal belongings:    Patient Signature: ________________________________________     Date/Time: ____________________________________    Staff Signature: ______________________________________     Date/Time:_____________________________________

## 2023-01-18 ENCOUNTER — OFFICE VISIT (OUTPATIENT)
Dept: BEHAVIORAL HEALTH | Facility: CLINIC | Age: 48
End: 2023-01-18
Payer: COMMERCIAL

## 2023-01-18 ENCOUNTER — HOSPITAL ENCOUNTER (OUTPATIENT)
Dept: BEHAVIORAL HEALTH | Facility: CLINIC | Age: 48
Discharge: HOME | End: 2023-01-18
Attending: FAMILY MEDICINE
Payer: COMMERCIAL

## 2023-01-18 DIAGNOSIS — F19.20 CHEMICAL DEPENDENCY (H): ICD-10-CM

## 2023-01-18 DIAGNOSIS — F43.23 ADJUSTMENT DISORDER WITH MIXED ANXIETY AND DEPRESSED MOOD: Primary | ICD-10-CM

## 2023-01-18 DIAGNOSIS — F11.20 OPIOID USE DISORDER, SEVERE, DEPENDENCE (H): Primary | ICD-10-CM

## 2023-01-18 LAB — FENTANYL UR QL: ABNORMAL

## 2023-01-18 PROCEDURE — H0038 SELF-HELP/PEER SVC PER 15MIN: HCPCS | Mod: U5

## 2023-01-18 PROCEDURE — 80307 DRUG TEST PRSMV CHEM ANLYZR: CPT

## 2023-01-18 PROCEDURE — H2035 A/D TX PROGRAM, PER HOUR: HCPCS | Mod: HQ

## 2023-01-18 PROCEDURE — 80354 DRUG SCREENING FENTANYL: CPT

## 2023-01-18 PROCEDURE — 90837 PSYTX W PT 60 MINUTES: CPT | Performed by: SOCIAL WORKER

## 2023-01-18 PROCEDURE — 1002N00001 HC LODGING PLUS FACILITY CHARGE ADULT

## 2023-01-18 NOTE — GROUP NOTE
"Group Therapy Documentation    PATIENT'S NAME: Ajith Delvalle  MRN:   [id number]  :   1975  ACCT. NUMBER: [id number]  DATE OF SERVICE: 23  START TIME:  9:40 AM  END TIME: 11:30 AM  FACILITATOR(S): Christina Ovalles [facility] [place]; Jose Alberto Ramos [facility]  TOPIC: BEH Group Therapy  Number of patients attending the group:  6  Group Length:  2 Hours    Group Therapy Type: Recovery strategies    Summary of Group / Topics Discussed:    Recovery Principles    Patents shared a \"Completing Ceremony\" for peer, and offered words of wisdom and congratulations.     Patients completed daily check ins and the question of the day  Patients presented assignments and shared feedback.  Patients engaged in reading and processing daily meditations.      Group Attendance:  Attended group session    Patient's response to the group topic/interactions:  cooperative with task    Patient appeared to be Actively participating.        Client specific details:  Ajith  attended AM small group therapy. Patient participated and remained engaged throughout group.         "

## 2023-01-18 NOTE — GROUP NOTE
Psychoeducation Group Documentation    PATIENT'S NAME: Ajith Delvalle  MRN:   [id number]  :   1975  ACCT. NUMBER: [id number]  DATE OF SERVICE: 23  START TIME:  8:30 AM  END TIME:  9:30 AM  FACILITATOR(S): Mikel Blair LADC; Betty Ashford; Jose Alberto Ramos LADC  TOPIC: BEH Pyschoeducation  Number of patients attending the group:  6  Group Length:  1 Hours    Skills Group Therapy Type: Recovery skills    Summary of Group / Topics Discussed:    Relationship/social skills and Balanced lifestyle skills          Group Attendance:  Attended group session    Patient's response to the group topic/interactions:  cooperative with task    Patient appeared to be Attentive and Engaged.         Client specific details:  The patient participated in the morning lecture on recovery skills.

## 2023-01-18 NOTE — PROGRESS NOTES
[facility]  January 18, 2023      Behavioral Health Clinician Progress Note    Patient Name: Ajith Delvalle           Service Type:  Individual      Service Location:   Face to Face in Clinic     Session Start Time: 1:00pm  Session End Time: 2:00pm      Session Length: 53 - 60      Attendees: Patient     Service Modality:  In-person    Visit Activities (Refresh list every visit): Psychotherapy    Diagnostic Assessment Date: Not completed yet  Treatment Plan Review Date: Not completed yet  See Flowsheets for today's PHQ-9 and GALA-7 results  Previous PHQ-9:   PHQ-9 SCORE 1/2/2023 1/9/2023   PHQ-9 Total Score 11 13     Previous GALA-7:   GALA-7 SCORE 1/2/2023 1/9/2023   Total Score 7 13       TAMANNA LEVEL:  No flowsheet data found.    DATA  Extended Session (60+ minutes): PROLONGED SERVICE IN THE OUTPATIENT SETTING REQUIRING DIRECT (FACE-TO-FACE) PATIENT CONTACT BEYOND THE USUAL SERVICE:    - Patient's presenting concerns require more intensive intervention than could be completed within the usual service  Interactive Complexity: No  Crisis: No  [facility] Patient: No    Treatment Objective(s) Addressed in This Session:  Target Behavior(s): mental health and recovery    Depressed Mood: Increase interest, engagement, and pleasure in doing things  Decrease frequency and intensity of feeling down, depressed, hopeless  Identify negative self-talk and behaviors: challenge core beliefs, myths, and actions  Improve concentration, focus, and mindfulness in daily activities   Anxiety: will experience a reduction in anxiety and will develop more effective coping skills to manage anxiety symptoms  Alcohol / Substance Use: will discuss/consider potential need for formal substance use evaluation, treatment and/or support  continue to make healthy choices regarding substance use and engage in activities / supportive services that promote sobriety    Current Stressors / Issues:  1:00pm to 2:pm    He is planning  to go to [place] to be with his sister and other family members and 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 she is doing successful-he talked about his history-he has legal issues and looking at legal time as he is currently having trials to attend now-he was trying to be able to use and let time go by to get a negative result-we talked about recovery and his experiences of letting go-he stated that he has been sober for 3 year in the past and February 2022 he began the relapse-I'am 2 weeks off of parole and she asked me to come in and I did not go-gave the story and then this is when the drugs happen talked about how he relapsed and found the self back in active addiction-he has a way about attempting to find ways through situations (being smart enough) to get through the challenge without taking responssiblity in his mind and finds that he gets exposed and it does not work out the way he thought in his mind-difficulty with presenting actions where he can be transparent and open in a genuine way leading to anxiety and depressive symptoms regarding (worry in the situation motivating the manipulative behaviors and depression regarding the consequences for being exposed)-has a lot of skills that are being directed in negative ways in his history and needs to improve his ability to believe in the self using his skills in a more open transparent way where his behaviors he does not have to use manipulative behaviors as he will be able to stand on his accomplishments.  He asked about his parking issue as he parked in the hospital parking lot before going to residential treatment in [facility]-she this writer coordinated with his staff in [facility] and with the parking office and the result is that he will be offered to purchase a monthly pass to significantly reduce his amount owed.          Progress on Treatment Objective(s) / Homework:  No improvement - PREPARATION (Decided to change -  considering how); Intervened by negotiating a change plan and determining options / strategies for behavior change, identifying triggers, exploring social supports, and working towards setting a date to begin behavior change    Motivational Interviewing    MI Intervention: Expressed Empathy/Understanding, Supported Autonomy, Collaboration, Evocation, Permission to raise concern or advise, Open-ended questions, Reflections: simple and complex and Change talk (evoked)     Change Talk Expressed by the Patient: Desire to change Reasons to change Need to change Committment to change Activation Taking steps    Provider Response to Change Talk: E - Evoked more info from patient about behavior change, A - Affirmed patient's thoughts, decisions, or attempts at behavior change, R - Reflected patient's change talk and S - Summarized patient's change talk statements      Care Plan review completed: No    Medication Review:  No changes to current psychiatric medication(s)    Medication Compliance:  NA    Changes in Health Issues:   None reported    Chemical Use Review:   Substance Use: Chemical use reviewed, no active concerns identified      Tobacco Use: Not reported    Assessment: Current Emotional / Mental Status (status of significant symptoms):  Risk status (Self / Other harm or suicidal ideation)  Patient denies a history of suicidal ideation, suicide attempts, self-injurious behavior, homicidal ideation, homicidal behavior and and other safety concerns  Patient denies current fears or concerns for personal safety.  Patient denies current or recent suicidal ideation or behaviors.  Patient denies current or recent homicidal ideation or behaviors.  Patient denies current or recent self injurious behavior or ideation.  Patient denies other safety concerns.  A safety and risk management plan has not been developed at this time, however patient was encouraged to call [place] [phone] should there be a change in any of these  risk factors.    Appearance:   Appropriate   Eye Contact:   Good   Psychomotor Behavior: Normal   Attitude:   Cooperative   Orientation:   All  Speech   Rate / Production: Normal/ Responsive Normal    Volume:  Normal   Mood:    Normal  Affect:    Appropriate   Thought Content:  Clear   Thought Form:  Coherent  Goal Directed  Logical   Insight:    Fair     Diagnoses:  1. Adjustment disorder with mixed anxiety and depressed mood    2. Chemical dependency (H)        Collateral Reports Completed:  Not Applicable    Plan: (Homework, other):  Patient was given information about behavioral services and encouraged to schedule a follow up appointment with the clinic [facility] as needed.  He was also given information about mental health symptoms and treatment options  and strategies to maintain sobriety.  CD Recommendations: Maintain Sobriety.   GABBY Causey

## 2023-01-18 NOTE — GROUP NOTE
Group Therapy Documentation    PATIENT'S NAME: Ajith Delvalle  MRN:   [id number]  :   1975  ACCT. NUMBER: [id number]  DATE OF SERVICE: 23  START TIME: 12:30 PM  END TIME:  2:30 PM  FACILITATOR(S): Destin Mccormack; Arben Pompa LADC  TOPIC: BEH Group Therapy  Number of patients attending the group:  6  Group Length:  2 Hours    Group Therapy Type: Recovery strategies and Health and wellbeing     Summary of Group / Topics Discussed:    Recovery Principles, Spiritual Care, and Mindfulness/Relaxation      Group Attendance:  Excused from group session    Patient's response to the group topic/interactions:  excused from group    Patient appeared to be: excused from group.        Client specific details:  Patient excused from group was attending session with Lorne PIERRE.

## 2023-01-18 NOTE — PROGRESS NOTES
[facility]    Peer  met with Ajith Delvalle in the Recovery Clinic to introduce himself, detail services provided and discuss current status of recovery. Pt appeared alert, oriented and open to feedback during our discussion.     Pt arrives for visit with Recovery Clinic psychotherapist.  Pt reports currently enrolled in [facility] inpatient program for approximately two and one half weeks.    PRC and pt discussed the benefits being received during this time in the LP program. PRC and pt discussed how being engaged and active with staff and other residents provides insights and networking opportunities.  Pt states being in some form of  treatment programming over the last four years.     PRC and pt discussed what the future looks like for him.  Pt states plans to extend recovery care to IOP plus housing at a [facility] facility.  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   PRC commends pt on his continued focus to sobriety and welcomes contact for recovery based support and resources.           Service Type:     Individual     Session Start Time:        1:05 pm                 Session End Time:          1:20 pm    Session Length:     15 minutes        Patient Goal:   Continue [facility] inpatient programming with additional treatment at [facility] plus sober housing.     Goal Progress:   Ongoing.    Key Risk Factors to Recovery:   PRC encourages being aware of risk factors that can lead to re-use which include avoiding isolation, avoiding triggers and managing cravings in a healthy manner. being open and willing to acceptance and change on a daily basis.  PRC encourages pt to establish a sober network calling tree to reach out to when needed.  Continue to practice honesty with ourselves and trusted support person(s).    PRC encourages regular attendance at recovery based meetings as well as finding a sponsor for mentoring and accountability.   PRC encourages consideration of other services such as counseling for mental health issues which can correlate with our substance use.      Support Needs:   Ongoing care, support and resources for opioid substance use disorder.     Follow up:   PRC has provided pt with his contact information for support and resource needs.    PRC and pt agree to meet during an upcoming  visit.       [facility]  [address], [address]   [place], [place], [address]  Clinic Phone: [phone]  Clinic Fax: [phone]  Peer  phone: [phone]    Open Monday - Friday  9:00am-4:00pm  Walk in hours: 9am-3pm      Eliu Elias  January 18, 2023  2:13 PM    Aj MOLINA provides clinical oversite and supervision of care.

## 2023-01-19 ENCOUNTER — HOSPITAL ENCOUNTER (OUTPATIENT)
Dept: BEHAVIORAL HEALTH | Facility: CLINIC | Age: 48
Discharge: HOME | End: 2023-01-19
Attending: FAMILY MEDICINE
Payer: COMMERCIAL

## 2023-01-19 PROCEDURE — 1002N00001 HC LODGING PLUS FACILITY CHARGE ADULT

## 2023-01-19 PROCEDURE — H2035 A/D TX PROGRAM, PER HOUR: HCPCS | Mod: HQ

## 2023-01-19 NOTE — GROUP NOTE
Group Therapy Documentation    PATIENT'S NAME: Ajith Delvalle  MRN:   [id number]  :   1975  ACCT. NUMBER: [id number]  DATE OF SERVICE: 23  START TIME: 12:30 PM  END TIME:  2:30 PM  FACILITATOR(S): Jose Alberto Ramos LADC; Destin Mccormack  TOPIC: BEH Group Therapy  Number of patients attending the group:  7  Group Length:  2 Hours    Group Therapy Type: Recovery strategies and Emotion processing    Summary of Group / Topics Discussed:    Sober coping skills, Relationship/socialization, and Relapse prevention    Group activity included the presentation of three assignments from patients. Feedback was provided throughout group session.           Group Attendance:  Attended group session    Patient's response to the group topic/interactions:  cooperative with task    Patient appeared to be Actively participating, Attentive and Engaged.        Client specific details: Patient actively engaged in group discussion, providing feedback to others .

## 2023-01-19 NOTE — GROUP NOTE
Psychoeducation Group Documentation    PATIENT'S NAME: Ajith Delvalle  MRN:   [id number]  :   1975  ACCT. NUMBER: [id number]  DATE OF SERVICE: 23  START TIME:  3:00 PM  END TIME:  4:00 PM  FACILITATOR(S): Arben Pompa LADC; Lory Vasquez LADC; Eva Verma LADC  TOPIC: BEH Pyschoeducation  Number of patients attending the group:  27  Group Length:  1 Hours    Skills Group Therapy Type: Recovery skills    Summary of Group / Topics Discussed:    Relapse prevention skills: Science of the Addiction on the Brain by Dr. Ramirez          Group Attendance:  Attended group session    Patient's response to the group topic/interactions:  cooperative with task    Patient appeared to be Attentive.         Client specific details: Pt was appropriate and attentive in PM Skills group

## 2023-01-19 NOTE — GROUP NOTE
"Group Therapy Documentation    PATIENT'S NAME: Ajith Delvalle  MRN:   [id number]  :   1975  ACCT. NUMBER: [id number]  DATE OF SERVICE: 23  START TIME:  9:00 AM  END TIME: 11:00 AM  FACILITATOR(S): Christina Ovalles LADC  TOPIC: BEH Group Therapy  Number of patients attending the group:  7   Group Length:  2 Hours    Group Therapy Type: Recovery strategies    Summary of Group / Topics Discussed:    Recovery Principles    Patients completed daily check ins and the question of the day, \" What is 1 characteristic in a relationship you value the most?\"    Patients presented assignments and shared feedback.     Patients discussed readings, and relationship boundaries and communication.      Patients engaged in reading and processing daily meditations.         Group Attendance:  Attended group session    Patient's response to the group topic/interactions:  cooperative with task    Patient appeared to be Actively participating.        Client specific details:   Ajith  attended AM small group therapy. Patient participated and remained engaged throughout group.         "

## 2023-01-20 ENCOUNTER — HOSPITAL ENCOUNTER (OUTPATIENT)
Dept: BEHAVIORAL HEALTH | Facility: CLINIC | Age: 48
Discharge: HOME | End: 2023-01-20
Attending: FAMILY MEDICINE
Payer: COMMERCIAL

## 2023-01-20 DIAGNOSIS — F19.20 CHEMICAL DEPENDENCY (H): ICD-10-CM

## 2023-01-20 LAB — SARS-COV-2 RNA RESP QL NAA+PROBE: NEGATIVE

## 2023-01-20 PROCEDURE — H2035 A/D TX PROGRAM, PER HOUR: HCPCS | Mod: HQ

## 2023-01-20 PROCEDURE — 1002N00001 HC LODGING PLUS FACILITY CHARGE ADULT

## 2023-01-20 PROCEDURE — U0005 INFEC AGEN DETEC AMPLI PROBE: HCPCS

## 2023-01-20 NOTE — GROUP NOTE
Group Therapy Documentation    PATIENT'S NAME: Ajith Delvalle  MRN:   [id number]  :   1975  ACCT. NUMBER: [id number]  DATE OF SERVICE: 23  START TIME: 12:30 PM  END TIME:  2:30 PM  FACILITATOR(S): Christina Ovalles LADC; Amanda Jacobo LADC; Eva Verma LADC  TOPIC: BEH Group Therapy  Number of patients attending the group:  30  Group Length:  2 Hours    Group Therapy Type: Emotion processing    Summary of Group / Topics Discussed:    Emotions/expression and Leisure explorations/use of leisure time      Group Attendance:  Attended group session    Patient's response to the group topic/interactions:  cooperative with task    Patient appeared to be Attentive.        Client specific details: Pt was appropriate and attentive PM group. Patient participated in recovery supportive activity, watching a therapeutic movie about addiction, the impacts on individual and loved ones.

## 2023-01-20 NOTE — GROUP NOTE
Group Therapy Documentation    PATIENT'S NAME: Ajith Delvalle  MRN:   [id number]  :   1975  [id number]  DATE OF SERVICE: 23  START TIME:  9:00 AM  END TIME: 11:00 AM  FACILITATOR(S): Jose Alberto Ramos LADC  TOPIC: BEH Group Therapy  Number of patients attending the group:  7  Group Length:  2 Hours    Group Therapy Type: Recovery strategies and Emotion processing    Summary of Group / Topics Discussed:    Sober coping skills and Emotions/expression    Group discussion began with a brief relaxation exercise, followed by group check-ins. Group discussion also included the presentation of assignments from two group participants. Feedback was provided was provided by participants throughout group session.       Group Attendance:  Attended group session    Patient's response to the group topic/interactions:  cooperative with task    Patient appeared to be Actively participating, Attentive and Engaged.        Client specific details:  Patient engaged in group discussion, providing feedback to others.       [Body Aches] : body aches [Feeling Tired] : feeling tired [Joint Pain] : joint pain [As Noted in HPI] : as noted in HPI [Negative] : Heme/Lymph [Fever] : no fever [Chills] : no chills [Difficulty Sleeping] : no difficulty sleeping [Feeling Sick] : not feeling sick [Confused] : no confusion [Convulsions] : no convulsions [Dizziness] : no dizziness

## 2023-01-21 ENCOUNTER — HOSPITAL ENCOUNTER (OUTPATIENT)
Dept: BEHAVIORAL HEALTH | Facility: CLINIC | Age: 48
Discharge: HOME | End: 2023-01-21
Attending: FAMILY MEDICINE
Payer: COMMERCIAL

## 2023-01-21 PROCEDURE — H2035 A/D TX PROGRAM, PER HOUR: HCPCS | Mod: HQ

## 2023-01-21 PROCEDURE — 1002N00001 HC LODGING PLUS FACILITY CHARGE ADULT

## 2023-01-21 NOTE — GROUP NOTE
"Psychoeducation Group Documentation    PATIENT'S NAME: Ajith Delvalle  MRN:   [id number]  :   1975  ACCT. NUMBER: [id number]  DATE OF SERVICE: 23  START TIME: 12:30 PM  END TIME:  2:30 PM  FACILITATOR(S): Arben Pompa LADC; Zaki Benton LADC  TOPIC: BEH Pyschoeducation  Number of patients attending the group:  8  Group Length:  2 Hours    Skills Group Therapy Type: Relationship skills development    Summary of Group / Topics Discussed:    Relationship/social skills          Group Attendance:  Attended group session    Patient's response to the group topic/interactions:  cooperative with task    Patient appeared to be Attentive.         Client specific details: Patient attended a skills lecture on \"Relationships\" and effective communication skills.  Patients gained knowledge on how to express their feelings in a healthy way, also each patient had an opportunity to process the information, ask questions        "

## 2023-01-21 NOTE — GROUP NOTE
Psychoeducation Group Documentation    PATIENT'S NAME: Ajith Delvalle  MRN:   [id number]  :   1975  ACCT. NUMBER: [id number]  DATE OF SERVICE: 23  START TIME:  9:00 AM  END TIME: 11:00 AM  FACILITATOR(S): Mikel Blair LADC  TOPIC: BEH Pyschoeducation  Number of patients attending the group:  8  Group Length:  2 Hours    Skills Group Therapy Type: Recovery skills and Relationship skills development    Summary of Group / Topics Discussed:    Relationship/social skills, Balanced lifestyle skills, and Relapse prevention skills          Group Attendance:  Attended group session    Patient's response to the group topic/interactions:  cooperative with task    Patient appeared to be Attentive and Engaged.         Client specific details:  The patient participated in the morning lecture on boundaries and family roles.

## 2023-01-22 ENCOUNTER — HOSPITAL ENCOUNTER (OUTPATIENT)
Dept: BEHAVIORAL HEALTH | Facility: CLINIC | Age: 48
Discharge: HOME | End: 2023-01-22
Attending: FAMILY MEDICINE
Payer: COMMERCIAL

## 2023-01-22 PROCEDURE — 1002N00001 HC LODGING PLUS FACILITY CHARGE ADULT

## 2023-01-22 PROCEDURE — H2035 A/D TX PROGRAM, PER HOUR: HCPCS | Mod: HQ

## 2023-01-22 NOTE — GROUP NOTE
Psychoeducation Group Documentation    PATIENT'S NAME: Ajith Delvalle  MRN:   [id number]  :   1975  ACCT. NUMBER: [id number]  DATE OF SERVICE: 23  START TIME:  9:00 AM  END TIME: 11:00 AM  FACILITATOR(S): Yash Celeste RN; Zaki Benton LADC  TOPIC: BEH Pyschoeducation  Number of patients attending the group:  8  Group Length:  2 Hours    Skills Group Therapy Type: Lecture on HIV-AIDS    Summary of Group / Topics Discussed:    By: TACHO Nurse          Group Attendance:  Attended group session    Patient's response to the group topic/interactions:  cooperative with task    Patient appeared to be Engaged.         Client specific details: This patient attended Lecture presented by: Nursing staff on HIV-Aids, and prevention and treatments. Patient engaged in Q&A after the lecture was presented. Patient actively engaged in group lecture.

## 2023-01-22 NOTE — GROUP NOTE
Psychoeducation Group Documentation    PATIENT'S NAME: Ajith Delvalle  MRN:   [id number]  :   1975  ACCT. NUMBER: [id number]  DATE OF SERVICE: 23  START TIME: 12:30 PM  END TIME:  1:30 PM  FACILITATOR(S): Mikel Blair LADC; Zaki Benton LADC  TOPIC: BEH Pyschoeducation  Number of patients attending the group:    Group Length:  1 Hours    Skills Group Therapy Type: Recovery skills and Relationship skills development    Summary of Group / Topics Discussed:    Relationship/social skills          Group Attendance:  Attended group session    Patient's response to the group topic/interactions:  cooperative with task    Patient appeared to be Attentive and Engaged.         Client specific details:  The patient participated in the afternoon lecture on Relationships.

## 2023-01-23 ENCOUNTER — HOSPITAL ENCOUNTER (OUTPATIENT)
Dept: BEHAVIORAL HEALTH | Facility: CLINIC | Age: 48
Discharge: HOME | End: 2023-01-23
Attending: FAMILY MEDICINE
Payer: COMMERCIAL

## 2023-01-23 DIAGNOSIS — F19.20 CHEMICAL DEPENDENCY (H): ICD-10-CM

## 2023-01-23 LAB — SARS-COV-2 RNA RESP QL NAA+PROBE: NEGATIVE

## 2023-01-23 PROCEDURE — U0003 INFECTIOUS AGENT DETECTION BY NUCLEIC ACID (DNA OR RNA); SEVERE ACUTE RESPIRATORY SYNDROME CORONAVIRUS 2 (SARS-COV-2) (CORONAVIRUS DISEASE [COVID-19]), AMPLIFIED PROBE TECHNIQUE, MAKING USE OF HIGH THROUGHPUT TECHNOLOGIES AS DESCRIBED BY CMS-2020-01-R: HCPCS

## 2023-01-23 PROCEDURE — 1002N00001 HC LODGING PLUS FACILITY CHARGE ADULT

## 2023-01-23 PROCEDURE — H2035 A/D TX PROGRAM, PER HOUR: HCPCS | Mod: HQ

## 2023-01-23 NOTE — GROUP NOTE
Group Therapy Documentation    PATIENT'S NAME: Ajtih Delvalle  MRN:   [id number]  :   1975  ACCT. NUMBER: [id number]  DATE OF SERVICE: 23  START TIME:  9:00 AM  END TIME: 11:00 AM  FACILITATOR(S): Christina Ovalles LADC  TOPIC: BEH Group Therapy  Number of patients attending the group:  6  Group Length:  2 Hours    Group Therapy Type: Recovery strategies    Summary of Group / Topics Discussed:    Recovery Principles    Patients completed daily check ins, and shared how they are feeling.   New patient completed intro to the group, and peers offered words of welcome.   Patients shared homework assignment, and offered feedback.   Patients completed readings on revenge, anger, consequences, and changing behaviors.         Group Attendance:  Attended group session    Patient's response to the group topic/interactions:  cooperative with task    Patient appeared to be Actively participating.        Client specific details:  Ajith  attended AM small group therapy. Patient participated and remained engaged throughout group.

## 2023-01-23 NOTE — GROUP NOTE
Group Therapy Documentation    PATIENT'S NAME: Ajith Delvalle  MRN:   [id number]  :   1975  ACCT. NUMBER: [id number]  DATE OF SERVICE: 23  START TIME: 12:30 PM  END TIME:  2:30 PM  FACILITATOR(S): Arben Pompa LADC; Shelby Tilley  TOPIC: BEH Group Therapy  Number of patients attending the group:  6  Group Length:  2 Hours    Group Therapy Type: Health and wellbeing     Summary of Group / Topics Discussed:    Spiritual Care      Group Attendance:  Attended group session    Patient's response to the group topic/interactions:  cooperative with task    Patient appeared to be Actively participating.        Client specific details:  Ajith participated and interacted appropriately with peers and staff in spiritual group. No triggers to use noted or discussed.
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
## 2023-01-24 ENCOUNTER — HOSPITAL ENCOUNTER (OUTPATIENT)
Dept: BEHAVIORAL HEALTH | Facility: CLINIC | Age: 48
Discharge: HOME | End: 2023-01-24
Attending: FAMILY MEDICINE
Payer: COMMERCIAL

## 2023-01-24 PROCEDURE — H2035 A/D TX PROGRAM, PER HOUR: HCPCS | Mod: HQ

## 2023-01-24 PROCEDURE — 1002N00001 HC LODGING PLUS FACILITY CHARGE ADULT

## 2023-01-24 NOTE — GROUP NOTE
Group Therapy Documentation    PATIENT'S NAME: Ajith Delvalle  MRN:   [id number]  :   1975  ACCT. NUMBER: [id number]  DATE OF SERVICE: 23  START TIME: 12:30 PM  END TIME:  2:30 PM  FACILITATOR(S): Destin Mccormack; Arben Pompa LADC  TOPIC: BEH Group Therapy  Number of patients attending the group:  6  Group Length:  2 Hours    Group Therapy Type: Emotion processing and Daily living/independence skills    Summary of Group / Topics Discussed:    Co-occurring illnesses symptom management, Disease of addiction, Emotions/expression, and Leisure explorations/use of leisure time      Group Attendance:  Attended group session    Patient's response to the group topic/interactions:  cooperative with task    Patient appeared to be Actively participating and Attentive.        Client specific details: Ajith attended PM group therapy. Patient engaged in discussion and participated in sharing feedback to his peers.

## 2023-01-24 NOTE — PROGRESS NOTES
Patient expressed wanting to attend [facility] on 1/21/23    Patient was given a sober housing list to contact, and secure housing.     Patient signed an MICHELLE for [facility], and paperwork was faxed on 1/24/23      STEVE Banuelos     January 24, 2023

## 2023-01-24 NOTE — GROUP NOTE
Group Therapy Documentation    PATIENT'S NAME: Ajith Delvalle  MRN:   [id number]  :   1975  ACCT. NUMBER: [id number]  DATE OF SERVICE: 23  START TIME:  3:00 PM  END TIME:  4:00 PM  FACILITATOR(S): Betty Ashford  TOPIC: BEH Group Therapy  Number of patients attending the group:  25    Group Length:  1 Hour    Group Therapy Type: Recovery strategies, Emotion processing, and Daily living/independence skills    Summary of Group / Topics Discussed:    Recovery Principles, Cognitive behavioral therapy skills, and Relapse prevention      Group Attendance:  Attended group session    Patient's response to the group topic/interactions:  cooperative with task    Patient appeared to be Actively participating, Attentive and Engaged.        Client specific details:  Patient attended group lecture and was attentive and participative.

## 2023-01-24 NOTE — GROUP NOTE
"Group Therapy Documentation    PATIENT'S NAME: Ajith Delvalle  MRN:   [id number]  :   1975  ACCT. NUMBER: [id number]  DATE OF SERVICE: 23  START TIME:  9:00 AM  END TIME: 11:00 AM  FACILITATOR(S): Christina Ovalles LADC  TOPIC: BEH Group Therapy  Number of patients attending the group: 6  Group Length:  2 Hours    Group Therapy Type: Recovery strategies    Summary of Group / Topics Discussed:    Recovery Principles          Patients completed check ins.   Patient shared \"Resentments\" and shared feedback.   Patients engaged in discussion regarding forgiveness, how writing a letter can be beneficial, as well as living a life of honesty and forgiveness.       Group Attendance:  Attended group session    Patient's response to the group topic/interactions:  cooperative with task    Patient appeared to be Actively participating.        Client specific details:  Ajith  attended AM small group therapy. Patient participated and remained engaged throughout group.           "

## 2023-01-25 ENCOUNTER — HOSPITAL ENCOUNTER (OUTPATIENT)
Dept: BEHAVIORAL HEALTH | Facility: CLINIC | Age: 48
Discharge: HOME | End: 2023-01-25
Attending: FAMILY MEDICINE
Payer: COMMERCIAL

## 2023-01-25 LAB
FENTANYL UR-MCNC: <1 NG/ML
NORFENTANYL UR-MCNC: 2.9 NG/ML

## 2023-01-25 PROCEDURE — 1002N00001 HC LODGING PLUS FACILITY CHARGE ADULT

## 2023-01-25 PROCEDURE — H2035 A/D TX PROGRAM, PER HOUR: HCPCS | Mod: HQ

## 2023-01-25 NOTE — GROUP NOTE
Group Therapy Documentation    PATIENT'S NAME: Ajith Delvalle  MRN:   [id number]  :   1975  ACCT. NUMBER: [id number]  DATE OF SERVICE: 23  START TIME:  9:40 AM  END TIME: 11:30 AM  FACILITATOR(S): Christina Ovalles LADC  TOPIC: BEH Group Therapy  Number of patients attending the group:  5  Group Length:  2 Hours    Group Therapy Type: Recovery strategies    Summary of Group / Topics Discussed:    Recovery Principles    Patients completed daily check ins and the question of the day.    Patients celebrated the completion of treatment of their peer, and offered words of wisdom for his departure.     Patients presented assignments and shared feedback.    Patients engaged in reading and processing processed with each other.         Group Attendance:  Attended group session    Patient's response to the group topic/interactions:  cooperative with task    Patient appeared to be Actively participating.        Client specific details:  Ajith  attended AM small group therapy. Patient participated and remained engaged throughout group.

## 2023-01-25 NOTE — GROUP NOTE
Group Therapy Documentation    PATIENT'S NAME: Ajith Delvalle  MRN:   [id number]  :   1975  ACCT. NUMBER: [id number]  DATE OF SERVICE: 23  START TIME: 12:30 PM  END TIME:  2:30 PM  FACILITATOR(S): Arben Pompa LADC  TOPIC: BEH Group Therapy  Number of patients attending the group:  5  Group Length:  2 Hours    Group Therapy Type: Recovery strategies    Summary of Group / Topics Discussed:    Recovery Principles      Group Attendance:  Attended group session    Patient's response to the group topic/interactions:  cooperative with task    Patient appeared to be Actively participating.        Client specific details:  Ajith participated and interacted appropriately with peers and staff in PM group. No triggers to use noted or discussed.

## 2023-01-25 NOTE — GROUP NOTE
Psychoeducation Group Documentation    PATIENT'S NAME: Ajith Delvalle  MRN:   [id number]  :   1975  ACCT. NUMBER: [id number]  DATE OF SERVICE: 23  START TIME:  8:30 AM  END TIME:  9:30 AM  FACILITATOR(S): Bernardino Larson LADC; Chetan Hart MD; Mikel Blair LADC  TOPIC: BEH Pyschoeducation  Number of patients attending the group:  5  Group Length:  1 Hours    Skills Group Therapy Type: Emotion regulation skills    Summary of Group / Topics Discussed:    Balanced lifestyle skills          Group Attendance:  Attended group session    Patient's response to the group topic/interactions:  cooperative with task    Patient appeared to be Attentive.         Client specific details:  Ajith gave appropriate feedback..

## 2023-01-26 ENCOUNTER — HOSPITAL ENCOUNTER (OUTPATIENT)
Dept: BEHAVIORAL HEALTH | Facility: CLINIC | Age: 48
Discharge: HOME | End: 2023-01-26
Attending: FAMILY MEDICINE
Payer: COMMERCIAL

## 2023-01-26 DIAGNOSIS — F19.20 CHEMICAL DEPENDENCY (H): ICD-10-CM

## 2023-01-26 LAB — FENTANYL UR QL: ABNORMAL

## 2023-01-26 PROCEDURE — H2035 A/D TX PROGRAM, PER HOUR: HCPCS | Mod: HQ

## 2023-01-26 PROCEDURE — 1002N00001 HC LODGING PLUS FACILITY CHARGE ADULT

## 2023-01-26 PROCEDURE — 80354 DRUG SCREENING FENTANYL: CPT

## 2023-01-26 PROCEDURE — 80307 DRUG TEST PRSMV CHEM ANLYZR: CPT

## 2023-01-26 NOTE — GROUP NOTE
Group Therapy Documentation    PATIENT'S NAME: Ajith Delvalle  MRN:   [id number]  :   1975  ACCT. NUMBER: [id number]  DATE OF SERVICE: 23  START TIME: 12:30 PM  END TIME:  2:30 PM  FACILITATOR(S): Destin Mccormack; Arben Pompa LADC  TOPIC: BEH Group Therapy  Number of patients attending the group:  5  Group Length:  2 Hours    Group Therapy Type: Recovery strategies and Emotion processing    Summary of Group / Topics Discussed:    Recovery Principles, Sober coping skills, and Disease of addiction      Group Attendance:  Attended group session and Excused from group session    Patient's response to the group topic/interactions:  cooperative with task and discussed personal experience with topic    Patient appeared to be Actively participating, Attentive and Engaged.        Client specific details:  Ajith attended PM group therapy. Patient engaged in discussion and participated in sharing feedback to his peers. Patient excused from portion of group.

## 2023-01-26 NOTE — GROUP NOTE
"Group Therapy Documentation    PATIENT'S NAME: Ajith Delvalle  MRN:   [id number]  :   1975  ACCT. NUMBER: [id number]  DATE OF SERVICE: 23  START TIME:  9:00 AM  END TIME: 11:00 AM  FACILITATOR(S): Jose Alberto Ramos LADC  TOPIC: BEH Group Therapy  Number of patients attending the group:  5  Group Length:  2 Hours    Group Therapy Type: Recovery strategies and Emotion processing    Summary of Group / Topics Discussed:    Recovery Principles, Sober coping skills, and Relationship/socialization    Group discussion began with check-ins followed by a reflection reading regarding recovery.   Group discussion also focused on the topic of Emotional Intelligence.       Group Attendance:  Attended group session    Patient's response to the group topic/interactions:  cooperative with task    Patient appeared to be Actively participating, Attentive and Engaged.        Client specific details:  Patient actvely engaged in group discussion.Patient shared Denial/Conseqences\" assignemnt.      "

## 2023-01-26 NOTE — GROUP NOTE
Psychoeducation Group Documentation    PATIENT'S NAME: Ajith Delvalle  MRN:   [id number]  :   1975  ACCT. NUMBER: [id number]  DATE OF SERVICE: 23  START TIME:  3:00 PM  END TIME:  4:00 PM  FACILITATOR(S): Mikel Blair LADC; Betty Ashford; Zaki Benton LADC  TOPIC: BEH Pyschoeducation  Number of patients attending the group:  5  Group Length:  1 Hours    Skills Group Therapy Type: Healthy behaviors development    Summary of Group / Topics Discussed:    Balanced lifestyle skills          Group Attendance:  Attended group session    Patient's response to the group topic/interactions:  cooperative with task    Patient appeared to be Attentive and Engaged.         Client specific details:  The patient participated in the afternoon skills group on Nutrition.

## 2023-01-27 ENCOUNTER — HOSPITAL ENCOUNTER (OUTPATIENT)
Dept: BEHAVIORAL HEALTH | Facility: CLINIC | Age: 48
Discharge: HOME | End: 2023-01-27
Attending: FAMILY MEDICINE
Payer: COMMERCIAL

## 2023-01-27 DIAGNOSIS — F19.20 CHEMICAL DEPENDENCY (H): ICD-10-CM

## 2023-01-27 LAB — SARS-COV-2 RNA RESP QL NAA+PROBE: NEGATIVE

## 2023-01-27 PROCEDURE — U0005 INFEC AGEN DETEC AMPLI PROBE: HCPCS

## 2023-01-27 PROCEDURE — H2035 A/D TX PROGRAM, PER HOUR: HCPCS | Mod: HQ

## 2023-01-27 PROCEDURE — 1002N00001 HC LODGING PLUS FACILITY CHARGE ADULT

## 2023-01-27 NOTE — GROUP NOTE
Group Therapy Documentation    PATIENT'S NAME: Ajith Delvalle  MRN:   [id number]  :   1975  ACCT. NUMBER: [id number]  DATE OF SERVICE: 23  START TIME:  9:00 AM  END TIME: 11:00 AM  FACILITATOR(S): Christina Ovalles LADC  TOPIC: BEH Group Therapy  Number of patients attending the group:  6  Group Length:  2 Hours    Group Therapy Type: Recovery strategies    Summary of Group / Topics Discussed:    Recovery Principles      Group Attendance:  Attended group session    Patient's response to the group topic/interactions:  cooperative with task    Patient appeared to be Actively participating.        Client specific details:  Ajith  attended AM small group therapy. Patient participated and remained engaged throughout group.

## 2023-01-27 NOTE — GROUP NOTE
Group Therapy Documentation    PATIENT'S NAME: Ajith Delvalle  MRN:   [id number]  :   1975  ACCT. NUMBER: [id number]  DATE OF SERVICE: 23  START TIME: 12:30 PM  END TIME:  2:30 PM  FACILITATOR(S): Mikel Blair LADC; Arben Pompa LADC  TOPIC: BEH Group Therapy  Number of patients attending the group:  6  Group Length:  2 Hours    Group Therapy Type: Recovery strategies    Summary of Group / Topics Discussed:    Recovery Principles, Relationship/socialization, and Balanced lifestyle      Group Attendance:  Attended group session    Patient's response to the group topic/interactions:  cooperative with task    Patient appeared to be Attentive and Engaged.        Client specific details:  Ajith participated in a sober activity followed by a discussion.

## 2023-01-28 ENCOUNTER — HOSPITAL ENCOUNTER (OUTPATIENT)
Dept: BEHAVIORAL HEALTH | Facility: CLINIC | Age: 48
Discharge: HOME | End: 2023-01-28
Attending: FAMILY MEDICINE
Payer: COMMERCIAL

## 2023-01-28 PROCEDURE — H2035 A/D TX PROGRAM, PER HOUR: HCPCS | Mod: HQ

## 2023-01-28 PROCEDURE — 1002N00001 HC LODGING PLUS FACILITY CHARGE ADULT

## 2023-01-28 NOTE — GROUP NOTE
Group Therapy Documentation    PATIENT'S NAME: jAith Delvalle  MRN:   [id number]  :   1975  ACCT. NUMBER: [id number]  DATE OF SERVICE: 23  START TIME: 12:30 PM  END TIME:  2:30 PM  FACILITATOR(S): Lory Vasquez LADC; Eva Verma LADC; Abhi Troncoso LADC  TOPIC: BEH Group Therapy  Number of patients attending the group: 24  Group Length:  2 Hours    Group Therapy Type: Recovery strategies    Summary of Group / Topics Discussed:    Relationship/socialization      Group Attendance:  Attended group session    Patient's response to the group topic/interactions:  cooperative with task    Patient appeared to be Attentive and Engaged.        Client specific details: Ajith was an active participant in workshop on personal strengths and skills.

## 2023-01-28 NOTE — GROUP NOTE
Group Therapy Documentation    PATIENT'S NAME: Ajith Delvalle  MRN:   [id number]  :   1975  ACCT. NUMBER: [id number]  DATE OF SERVICE: 23  START TIME:  9:00 AM  END TIME: 11:00 AM  FACILITATOR(S): Eva Verma LADC; Abhi Troncoso LADC  TOPIC: BEH Group Therapy  Number of patients attending the group:  24  Group Length:  2 Hours    Group Therapy Type: Recovery strategies and Emotion processing    Summary of Group / Topics Discussed:    Recovery Principles, Relationship/socialization, and Emotions/expression      Group Attendance:  Attended group session    Patient's response to the group topic/interactions:  cooperative with task and listened actively    Patient appeared to be Actively participating, Attentive and Engaged.        Client specific details:  Ajith learned about honesty in both relationships and recovery.

## 2023-01-29 ENCOUNTER — HOSPITAL ENCOUNTER (OUTPATIENT)
Dept: BEHAVIORAL HEALTH | Facility: CLINIC | Age: 48
Discharge: HOME | End: 2023-01-29
Attending: FAMILY MEDICINE
Payer: COMMERCIAL

## 2023-01-29 ENCOUNTER — TELEPHONE (OUTPATIENT)
Dept: BEHAVIORAL HEALTH | Facility: CLINIC | Age: 48
End: 2023-01-29

## 2023-01-29 DIAGNOSIS — F41.8 DEPRESSION WITH ANXIETY: ICD-10-CM

## 2023-01-29 PROCEDURE — H2035 A/D TX PROGRAM, PER HOUR: HCPCS | Mod: HQ

## 2023-01-29 PROCEDURE — 1002N00001 HC LODGING PLUS FACILITY CHARGE ADULT

## 2023-01-29 NOTE — PROGRESS NOTES
Nursing Discharge Planning Meeting    Writer completed discharge planning meeting with patient. Discharge is planned for Monday, 1/30/23    Discussed appropriate follow up care to manage ROMMEL, medical, MH and to obtain medication refills. Patient given a copy of their current medications for reference. Questions were answered at this time and the patient verbalized an understanding of the post-discharge follow up plan.    Patient will f/u with PCP as recommended and/or is aware of upcoming appt:  [facility]  [address] [place] [address]  [address]  Department: Internal Medicine  Phone: [phone]  Appt date and time: 2/13/23 at 1:30pm  Provider: John Solis MD    Patient will f/u with Addiction Medicine Provider as recommended and/or is aware of upcoming appt:  [facility]   [address], [address]   [place], [place], [address]  Phone: [phone]  Fax: [phone]  Open Mon, Wed, Fridays  9:00am-4:00pm  Walk in hours: 9am-3pm   Clinic closed between 11:30 - 12:30 for lunch    Patient will f/u with [facility] Psych[facility] and is aware of upcoming appt:  [facility] Psychiatry, [facility], 2nd floor [address]  [address] [place] [address]  [address]    [phone]  Appt date and time: 3/8/23 at 9AM  Provider: Van Jung MD    Continue to support patient in discharge planning as needed to assure appropriate continuity of care.     Tobacco Cessation  Patient participated in the nicotine replacement therapy for tobacco cessation or reduction during their treatment programming: No    The patient was provided with community resources for follow-up to continue tobacco cessation support once in the community. Also the patient was encouraged to discuss their tobacco cessation efforts with the primary care provider.

## 2023-01-29 NOTE — GROUP NOTE
Psychoeducation Group Documentation    PATIENT'S NAME: Ajith Delvalle  MRN:   [id number]  :   1975  ACCT. NUMBER: [id number]  DATE OF SERVICE: 23  START TIME: 12:30 PM  END TIME:  1:30 PM  FACILITATOR(S): Lory Vasquez LADC; Eva Verma LADC  TOPIC: BEH Pyschoeducation  Number of patients attending the group: 24  Group Length:  1 Hours    Skills Group Therapy Type: Emotion regulation skills    Summary of Group / Topics Discussed:    Relationship/social skills and Balanced lifestyle skills          Group Attendance:  Attended group session    Patient's response to the group topic/interactions:  cooperative with task    Patient appeared to be Engaged.         Client specific details: Pt was appropriate and engaged, during  Skills group.

## 2023-01-29 NOTE — TELEPHONE ENCOUNTER
Dr Clarke,    This pt will run out of prescription prior to his next appt with you on 2/13/23:    Bupropion HCL ER (XL) Take one Tablet by mouth every morning      Quetiapine Fumarate 300 mg tabs.  Take one tablet by mouth once daily    Please could you refill at [facility], [address] [place] [address], [facility]  [address] [phone]    Please advise    Thank you    Isaura GALLO [place] [id number]    Isaura Carroll RN  [facility]  Nurse Liaison at  [facility]  Office - [phone]  Fax - [phone]

## 2023-01-29 NOTE — GROUP NOTE
Group Therapy Documentation    PATIENT'S NAME: Ajith Delvalle  MRN:   [id number]  :   1975  ACCT. NUMBER: [id number]  DATE OF SERVICE: 23  START TIME:  8:45 AM  END TIME: 10:30 AM  FACILITATOR(S): Lory Vasquez LADC; Isaura Carroll RN; Eva Verma LADC  TOPIC: BEH Group Therapy  Number of patients attending the group: 24  Group Length:  2 Hours    Group Therapy Type: Health and wellbeing     Summary of Group / Topics Discussed:    Hepatitis C and Tuberculosis      Group Attendance:  Attended group session    Patient's response to the group topic/interactions:  cooperative with task    Patient appeared to be Attentive and Engaged.        Client specific details: Ajith was an active participant in LPRN lecture on Hepatitis C/TB.

## 2023-01-30 RX ORDER — BUPROPION HYDROCHLORIDE 150 MG/1
150 TABLET ORAL EVERY MORNING
Qty: 30 TABLET | Refills: 0 | Status: SHIPPED | OUTPATIENT
Start: 2023-01-30 | End: 2023-02-13

## 2023-01-30 RX ORDER — QUETIAPINE FUMARATE 300 MG/1
300 TABLET, FILM COATED ORAL DAILY
Qty: 30 TABLET | Refills: 0 | Status: SHIPPED | OUTPATIENT
Start: 2023-01-30 | End: 2023-02-13

## 2023-01-30 NOTE — PROGRESS NOTES
CHEMICAL DEPENDENCY DISCHARGE SUMMARY    PATIENT NAME: Ajith Delvalle    : 1975      EVALUATION COUNSELOR: Chasidy Bruce Formerly named [facility] PATRICIO ([facility]).    TREATMENT COUNSELORS: SABINO Russo and STEVE Banuelos   REFERRAL SOURCE: [facility]/ [facility]  PROGRAM: [facility]  ADMISSION DATE: 2023   DATE OF LAST SESSION: 2023   DISCHARGE DATE: 2023     ADMISSION DIAGNOSIS:    Cannabis Use Disorder Severe - 304.30 (F12.20)  Opioid Use Disorder Severe - 304.00 (F11.20)    DISCHARGE DIAGNOSIS:  Cannabis Use Disorder Severe - 304.30 (F12.20)  Opioid Use Disorder Severe - 304.00 (F11.20)    DISCHARGE STATUS: Pt completed the [facility] program.    LAST USE DATE: Patient reported last date of use as 2022  DAYS OF TREATMENT COMPLETED: Patient completed 28 days of treatment.    PRESENTING INFORMATION: Ajith Delvalle presented to the [facility] in [place], [place] for a substance use evaluation. The pt was a self referent referred by [facility]. The pt was seeking tx for cannabis and opioid use. The pt reported a hx of dental concerns (chipped teeth), multiple head injuries, low iron levels, depression, and anxiety. Pt reported unemployment and denied homelessness. Pt was assessed to be appropriate for substance use disorder tx at [facility].     READMITTANCE INFORMATION: If additional substance use disorder tx is required, pt may re-admit into the [facility] program following 30 days from date of discharge.     SERVICES PROVIDED: Our services included assessment, treatment planning and education regarding chemical dependency, mental illness, relationships, and relapse prevention. The patient also participated in individual therapy, group therapy, recovery oriented workshops, spiritual care counseling, recovery skills training, and aftercare planning.    ISSUES ADDRESS IN  "TREATMENT:    DIMENSION 1 - ACUTE INTOXICATION/WITHDRAWAL POTENTIAL  ADMISSION RISK RATIN   DISCHARGE RISK RATIN   Patient entered into [facility] on 2023. Patient reported substances of use as cannabis and opioids. Patient reported last date of use as 2022. Throughout treatment patient denied any withdrawal symptoms that would interfere with full participation in treatment programming.     DIMENSION 2 - BIOMEDICAL COMPLICATIONS AND CONDITIONS  ADMISSION RISK RATIN   DISCHARGE RISK RATIN   Upon admissions patient denied any medical concerns that would interfere with full participation in treatment programming. Pt reported having a PCC at [facility] in [place], [place]. Pt denied having a PCP there. Patient maintain medication compliance throughout treatment. Upon discharge patient appeared able to access medical aid as needed. Upon admission pt reported prior dental concerns (chipped teeth), multiple head injuries, and low iron levels.     DIMENSION 3 - EMOTIONAL, BEHAVIORAL, COGNITIVE CONDITIONS AND COMPLICATIONS  ADMISSION RISK RATIN   DISCHARGE RISK RATIN   Upon admissions patient reported a mental health diagnoses of depression and anxiety. To address this concern patient met with staff therapist Lorne Rabago [facility]. Patient reported this as a healthy therapeutic relationship. Upon admissions patient was given a suicidal risk screening. Patient was rated as \"Very Low Risk\". Upon discharge patient denied any suicidal thoughts or ideations.     DIMENSION 4 - READINESS FOR CHANGE  ADMISSION RISK RATIN   DISCHARGE RISK RATIN   Goal: Develop a greater awareness of substance use patterns and identify consequences of addiction, strength and commitment to making significant lifestyle changes supportive of recovery. Intervention: Pt completed and presented his drug use history/ progression assignment. The assignment helps to identify patterns of use and " "related emotional costs. The pt presented as an active and willing participant in all scheduled programming. Pt was also provided with a \"First Step\" assignment which was meant to develop his understanding of the AA's First Step and how it relates to his tx program and recovery.    DIMENSION 5 - RELAPSE, CONTINUED USE AND CONTINUED PROBLEM POTENTIAL   ADMISSION RISK RATIN  DISCHARGE RISK RATIN   Goal: Structure an aftercare plan, which will provide for continued support and skills development. Intervention: The pt was encouraged by his primary counselors to consider transitioning into an outpatient tx program after completing [facility]. The pt was accepted into [facility] prior to being discharged from [facility]. Goal: Gain insight into personal relapse cues and effective prevention strategies. Intervention: Pt attended two relapse prevention workshops. The pt was provided assignments to develop a personal relapse prevention plan and understand potential self-sabotaging, triggers, and cues. Pt was also required to attend at least three sober support meetings a week while in [facility].    DIMENSION 6 - RECOVERY ENVIRONMENT  ADMISSION RISK RATIN   DISCHARGE RISK RATIN   Goal: Ensure that living environment is supportive of recovery. Intervention: Pt worked with counselors and staff to obtain sober housing along with obtaining a tx program after discharge from [facility]. Pt reported submitting an online application to [facility]. Goal: Begin to develop skills necessary to building a sober support network, identify helpful community resources. Intervention: The pt was provided information/ suggestions about getting a temporary sponsor and resources were made available to assist pt in identifying sober support group times and locations he might attend after completing [facility]. Goal: Begin process of healing personal relationships damaged by ongoing use and related " behaviors. Intervention: Pt was encouraged to invite family/ concerned persons to attend an individual session via telephone. Pt and counselors attended a meeting with [facility] members via Teams. The pt attended two weekend workshops on personal relationships.          STRENGTHS: Patient maintained a positive attitude while in treatment. Patient was an active participant in group therapy and was always open for feedback from his counselors and peers. Patient appears motivated for recovery at this time and willing to incorporate positive changes into his life.     LIVING ARRANGEMENTS AT DISCHARGE: Pt reported submitting an online application to [facility].    PROGNOSIS: Prognosis for this patient is favorable at this time. This can be maintained if the patient follows the continuing care recommendations below.      CONTINUING CARE RECOMMENDATIONS AND REFERRALS:    1.  Abstain from all mood-altering chemicals unless prescribed by a licensed medical provider and take all medications as prescribed.  2.  Attend a minimum of three AA/NA/Sober support groups weekly in the community.  3.  Obtain a permanent male sponsor and maintain regular contact with him.  4.  Admit immediately into [place] [facility] and [facility] and follow all recommendations.  5.  Continue to invest in building a sober support network.  6.  Continue to monitor and understand relapse triggers and stressors through the use and development of healthy coping skills.  7.  Obtain a mental health therapist and attend all scheduled programming.  8. Attend all scheduled medical appointments.  9. Take medication as prescribed.  10.  Follow all drug court recommendations.       This information has been disclosed to you from records protected by Federal confidentiality rules (42 CFR part 2). The Federal rules prohibit you from making any further disclosure of this information unless further disclosure is expressly  permitted by the written consent of the person to whom it pertains or as otherwise permitted by 42 CFR part 2. A general authorization for the release of medical or other information is NOT sufficient for this purpose. The Federal rules restrict any use of the information to criminally investigate or prosecute any alcohol or drug abuse patient.       STEVE Russo

## 2023-01-30 NOTE — ADDENDUM NOTE
Encounter addended by: Arben Pompa LADC on: 1/30/2023 2:53 PM   Actions taken: Pend clinical note, Clinical Note Signed

## 2023-01-30 NOTE — ADDENDUM NOTE
Encounter addended by: Arben Pompa LADC on: 1/30/2023 2:54 PM   Actions taken: Episode edited, Episode resolved

## 2023-02-06 LAB
FENTANYL UR-MCNC: <1 NG/ML
NORFENTANYL UR-MCNC: 2.6 NG/ML

## 2023-02-13 ENCOUNTER — LAB (OUTPATIENT)
Dept: LAB | Facility: CLINIC | Age: 48
End: 2023-02-13
Payer: COMMERCIAL

## 2023-02-13 ENCOUNTER — OFFICE VISIT (OUTPATIENT)
Dept: INTERNAL MEDICINE | Facility: CLINIC | Age: 48
End: 2023-02-13
Payer: COMMERCIAL

## 2023-02-13 VITALS
WEIGHT: 223 LBS | HEART RATE: 78 BPM | BODY MASS INDEX: 35.84 KG/M2 | HEIGHT: 66 IN | DIASTOLIC BLOOD PRESSURE: 106 MMHG | OXYGEN SATURATION: 97 % | SYSTOLIC BLOOD PRESSURE: 160 MMHG

## 2023-02-13 DIAGNOSIS — F41.8 DEPRESSION WITH ANXIETY: Primary | ICD-10-CM

## 2023-02-13 DIAGNOSIS — D50.9 IRON DEFICIENCY ANEMIA, UNSPECIFIED IRON DEFICIENCY ANEMIA TYPE: ICD-10-CM

## 2023-02-13 DIAGNOSIS — R03.0 ELEVATED BP WITHOUT DIAGNOSIS OF HYPERTENSION: ICD-10-CM

## 2023-02-13 DIAGNOSIS — F19.20 CHEMICAL DEPENDENCY (H): ICD-10-CM

## 2023-02-13 DIAGNOSIS — Z23 NEED FOR VACCINATION: ICD-10-CM

## 2023-02-13 DIAGNOSIS — E11.9 TYPE 2 DIABETES MELLITUS WITHOUT COMPLICATION, WITHOUT LONG-TERM CURRENT USE OF INSULIN (H): ICD-10-CM

## 2023-02-13 DIAGNOSIS — F19.20 CHEMICAL DEPENDENCY (H): Primary | ICD-10-CM

## 2023-02-13 DIAGNOSIS — Z72.0 TOBACCO ABUSE: ICD-10-CM

## 2023-02-13 LAB
ANION GAP SERPL CALCULATED.3IONS-SCNC: 11 MMOL/L (ref 7–15)
BUN SERPL-MCNC: 9 MG/DL (ref 6–20)
CALCIUM SERPL-MCNC: 9.4 MG/DL (ref 8.6–10)
CHLORIDE SERPL-SCNC: 103 MMOL/L (ref 98–107)
CREAT SERPL-MCNC: 0.86 MG/DL (ref 0.67–1.17)
DEPRECATED HCO3 PLAS-SCNC: 26 MMOL/L (ref 22–29)
GFR SERPL CREATININE-BSD FRML MDRD: >90 ML/MIN/1.73M2
GLUCOSE SERPL-MCNC: 96 MG/DL (ref 70–99)
POTASSIUM SERPL-SCNC: 3.8 MMOL/L (ref 3.4–5.3)
SODIUM SERPL-SCNC: 140 MMOL/L (ref 136–145)

## 2023-02-13 PROCEDURE — 36415 COLL VENOUS BLD VENIPUNCTURE: CPT | Performed by: PATHOLOGY

## 2023-02-13 PROCEDURE — 90677 PCV20 VACCINE IM: CPT | Performed by: INTERNAL MEDICINE

## 2023-02-13 PROCEDURE — 99215 OFFICE O/P EST HI 40 MIN: CPT | Mod: 25 | Performed by: INTERNAL MEDICINE

## 2023-02-13 PROCEDURE — 80048 BASIC METABOLIC PNL TOTAL CA: CPT | Performed by: PATHOLOGY

## 2023-02-13 PROCEDURE — 90471 IMMUNIZATION ADMIN: CPT | Performed by: INTERNAL MEDICINE

## 2023-02-13 RX ORDER — LANCETS
EACH MISCELLANEOUS
Qty: 100 EACH | Refills: 6 | Status: SHIPPED | OUTPATIENT
Start: 2023-02-13

## 2023-02-13 RX ORDER — BUPROPION HYDROCHLORIDE 300 MG/1
300 TABLET ORAL EVERY MORNING
Qty: 30 TABLET | Refills: 1 | Status: SHIPPED | OUTPATIENT
Start: 2023-02-13 | End: 2023-03-14

## 2023-02-13 RX ORDER — QUETIAPINE FUMARATE 200 MG/1
200 TABLET, FILM COATED ORAL DAILY
Qty: 30 TABLET | Refills: 0 | Status: SHIPPED | OUTPATIENT
Start: 2023-02-13 | End: 2023-03-08

## 2023-02-13 RX ORDER — ATORVASTATIN CALCIUM 20 MG/1
20 TABLET, FILM COATED ORAL DAILY
Qty: 90 TABLET | Refills: 3 | Status: SHIPPED | OUTPATIENT
Start: 2023-02-13

## 2023-02-13 RX ORDER — GLUCOSAMINE HCL/CHONDROITIN SU 500-400 MG
CAPSULE ORAL
Qty: 100 EACH | Refills: 3 | Status: SHIPPED | OUTPATIENT
Start: 2023-02-13

## 2023-02-13 NOTE — LETTER
February 14, 2023      Ajith Delvalle  [address]    [facility] [address]        Dear ,    We are writing to inform you of your test results.    Your electrolytes and kidney function are normal.    Resulted Orders   Basic metabolic panel  (Ca, Cl, CO2, Creat, Gluc, K, Na, BUN)   Result Value Ref Range    Sodium 140 136 - 145 mmol/L    Potassium 3.8 3.4 - 5.3 mmol/L    Chloride 103 98 - 107 mmol/L    Carbon Dioxide (CO2) 26 22 - 29 mmol/L    Anion Gap 11 7 - 15 mmol/L    Urea Nitrogen 9.0 6.0 - 20.0 mg/dL    Creatinine 0.86 0.67 - 1.17 mg/dL    Calcium 9.4 8.6 - 10.0 mg/dL    Glucose 96 70 - 99 mg/dL    GFR Estimate >90 >60 mL/min/1.73m2      Comment:      eGFR calculated using 2021 CKD-EPI equation.       If you have any questions or concerns, please call the clinic at the number listed above.       Sincerely,      John Clarke MD

## 2023-02-13 NOTE — NURSING NOTE
Ajith Delvalle is a 48 year old male patient that presents today in clinic for the following:    Chief Complaint   Patient presents with     RECHECK     Depression, diabetes, Possible UA     The patient's allergies and medications were reviewed as noted. A set of vitals were recorded as noted without incident. The patient does not have any other questions for the provider.    Giancarlo Ramos, EMT at 1:32 PM on 2/13/2023

## 2023-02-13 NOTE — PATIENT INSTRUCTIONS
Increase Wellbutrin to 300mg daily and decrease Seroquel to 200mg daily, and we'll follow-up in 1 month.     Start atorvastatin cholesterol medication to reduce heart attack and stroke risk.

## 2023-02-13 NOTE — PROGRESS NOTES
Assessment & Plan     Depression with anxiety    Ajith feels that mood has improved since resuming Wellbutrin but he'd like to try increasing the dose, so we'll go from 150mg to 300mg.  He would like to start tapering down/off Seroquel (weight gain), so we'll decrease from 300mg to 200mg.  Follow-up in 1 month.     - buPROPion (WELLBUTRIN XL) 300 MG 24 hr tablet; Take 1 tablet (300 mg) by mouth every morning  - QUEtiapine (SEROQUEL) 200 MG tablet; Take 1 tablet (200 mg) by mouth daily    Type 2 diabetes mellitus without complication, without long-term current use of insulin (H)    Ajith also presents to discuss a new diagnosis of diabetes.  Pathogenesis and potential complications of diabetes discussed.  Discussed low carb diet and referred to diabetic educator for further discussion and teaching on using glucometer.  A1C is 6.7, so we'll plan to start with lifestyle change.  Recommended starting statin for risk reduction, and Ajith is agreeable.  Urine albumin in process.  Normal foot exam today.  Discussed need for annual eye exams, and referral placed.  Recommended Pneumovax in the setting of diabetes, and he was agreeable to receiving this today as well.  Monitor blood sugars daily, and we'll recheck A1C in 3 months.      - Basic metabolic panel  (Ca, Cl, CO2, Creat, Gluc, K, Na, BUN); Future  - Albumin Random Urine Quantitative with Creat Ratio; Future  - FOOT EXAM  - [facility] Adult Diabetes Educator Referral; Future  - blood glucose monitoring (NO BRAND SPECIFIED) meter device kit; Use to test blood sugar 1 times daily or as directed. Preferred blood glucose meter OR supplies to accompany: Blood Glucose Monitor Brands: per insurance.  - blood glucose (NO BRAND SPECIFIED) test strip; Use to test blood sugar 1 times daily or as directed. To accompany: Blood Glucose Monitor Brands: per insurance.  - blood glucose calibration (NO BRAND SPECIFIED) solution; To accompany: Blood Glucose Monitor Brands: per  "insurance.  - thin (NO BRAND SPECIFIED) lancets; Use with lanceting device. To accompany: Blood Glucose Monitor Brands: per insurance.  - alcohol swab prep pads; Use to swab area of injection/pj as directed.  - atorvastatin (LIPITOR) 20 MG tablet; Take 1 tablet (20 mg) by mouth daily  - Adult Eye  Referral; Future    Tobacco abuse    Ajith is only smoking 1-2 cigs per day and is interested in quitting.  He already has nicotine gum at home and he'd like to continue this method, refills placed on file.     - nicotine (NICORETTE) 2 MG gum; Place 1 each (2 mg) inside cheek every hour as needed for smoking cessation    Chemical dependency (H)    Ajith reports having a drug screen positive for Fentanyl through his treatment program but he has not used Fentanyl so believes this is a false positive.  Sounds like they do have confirmation testing in progress there, but he wanted to check a drug screen here too.     - Drug abuse screen 8 urine (UR); Future    Elevated BP without diagnosis of hypertension    BP quite high today but other recent clinic BP and BPs in treatment program have been good.  He reports feeling stressed out about the positive drug test as above.  We'll continue to monitor.  Consider ACE if still high in the setting of diabetes.     Iron deficiency anemia, unspecified iron deficiency anemia type    He started an iron supplement; plan to recheck labs before next visit in one month.       40 minutes spent on the date of the encounter doing chart review, history and exam, documentation and further activities per the note      Return in about 1 month (around 3/13/2023) for Follow up.    John Clarke MD  [facility] INTERNAL MEDICINE [facility]   Ajith is a 48 year old, presenting for the following health issues:  RECHECK (Depression, diabetes, Possible UA)      HPI     I saw Ajith last on 1/9 and we discussed depression/anxiety: \"He feels the Seroquel isn't " "working well and is causing weight gain.  He'd like to get off this and go back to Wellbutrin.  We'll start the Welbutrin today and continue current Seroquel dose for now to give the Wellbutrin some time to kick in.  Follow-up in 1 month to reassess and start weaning Seroquel if he is doing well.  Continue current clonidine dosing.\"    We did some lab work at that visit, which showed a new diagnosis of diabetes with an A1C of 6.7.      Today, Ajith feels that mood is better since resuming Wellbutrin, feels happier and less down, more energy.     Ajith had a urine drug test a few days ago that was a rapid test that was positive for Fentanyl and he questions the accuracy since he hasn't used any Fentanyl.  They have ordered a confirmation test.       BPs have been checked at treatment program and have been 120/80s.        Review of Systems   Constitutional, endo, psych systems are negative, except as otherwise noted.      Objective    BP (!) 160/106   Pulse 78   Ht 1.674 m (5' 5.91\")   Wt 101.2 kg (223 lb)   SpO2 97%   BMI 36.10 kg/m    Body mass index is 36.1 kg/m .  Physical Exam   GENERAL: healthy, alert and no distress  PSYCH: affected somewhat flat  Diabetic foot exam: normal DP and PT pulses, normal sensory exam and bilateral bunions                 "

## 2023-02-15 ENCOUNTER — TELEPHONE (OUTPATIENT)
Dept: INTERNAL MEDICINE | Facility: CLINIC | Age: 48
End: 2023-02-15
Payer: COMMERCIAL

## 2023-02-15 NOTE — TELEPHONE ENCOUNTER
Attempted to call patient to inform him of need to collect new urine sample. Pt's urine sample that was collected at office appt with Dr. Clarke was unable to be run by lab. There is an order placed and pt can be instructed to visit any [facility] lab to provide new specimen.

## 2023-03-08 ENCOUNTER — TELEPHONE (OUTPATIENT)
Dept: PSYCHIATRY | Facility: CLINIC | Age: 48
End: 2023-03-08
Payer: COMMERCIAL

## 2023-03-08 ENCOUNTER — OFFICE VISIT (OUTPATIENT)
Dept: PSYCHIATRY | Facility: CLINIC | Age: 48
End: 2023-03-08
Attending: PSYCHIATRY & NEUROLOGY
Payer: COMMERCIAL

## 2023-03-08 VITALS
BODY MASS INDEX: 35.79 KG/M2 | WEIGHT: 221.1 LBS | DIASTOLIC BLOOD PRESSURE: 119 MMHG | SYSTOLIC BLOOD PRESSURE: 170 MMHG | HEART RATE: 73 BPM

## 2023-03-08 DIAGNOSIS — F11.90 OPIOID USE DISORDER: Primary | ICD-10-CM

## 2023-03-08 DIAGNOSIS — F41.8 DEPRESSION WITH ANXIETY: ICD-10-CM

## 2023-03-08 PROCEDURE — 90792 PSYCH DIAG EVAL W/MED SRVCS: CPT | Mod: GC | Performed by: HOSPITALIST

## 2023-03-08 RX ORDER — BUPRENORPHINE 2 MG/1
TABLET SUBLINGUAL
Qty: 110 TABLET | Refills: 0 | Status: SHIPPED | OUTPATIENT
Start: 2023-03-08 | End: 2023-04-05

## 2023-03-08 RX ORDER — QUETIAPINE FUMARATE 150 MG/1
200 TABLET, FILM COATED ORAL DAILY
Qty: 30 TABLET | Refills: 0 | Status: SHIPPED | OUTPATIENT
Start: 2023-03-08 | End: 2023-04-05

## 2023-03-08 ASSESSMENT — PAIN SCALES - GENERAL: PAINLEVEL: NO PAIN (0)

## 2023-03-08 NOTE — TELEPHONE ENCOUNTER
Call back requested  Received: Today  Melody Soto Ese, MD  Cc: Swathi Doran, RN  Phone Number: [phone]     Heath Amaral,     Patient requested a call back from you and said he had more questions about his medications. I did let him know that I'd CC your RN as well just in case and he said that he is open to talking to either, as long as he can discuss his meds.     He called from [phone]     Thank you,   Melody     Follow up:    Reached out to patient and updated him that a PA was submitted for Subutex. Updated him that it can take up to 2-3 days to hear back from the insurance. He verbalized understanding.

## 2023-03-08 NOTE — TELEPHONE ENCOUNTER
Prior Authorization Retail Medication Request    Medication/Dose: buprenorphine (SUBUTEX) 2 MG SUBL sublingual tablet-Place 1 tablet (2 mg) under the tongue 2 times daily for 4 days, THEN 2 tablets (4 mg) 2 times daily for 26 days. - Sublingual  ICD code (if different than what is on RX):  Opioid use disorder [F11.90]  - Primary   Previously Tried and Failed: Clonidine   Rationale:  Patient has a long standing history opoid use disorder and is prescribed Subutex. He was prescribed Suboxone since 2022 but has been experiencing nausea, vomiting and feeling very sick.  He is recommended to take Subutex to help with cravings. If patient is not able to trial this medication, he is at a high risk for decompensation and re-admitted in the hospital.       Insurance Name:  Not provided   Insurance ID: not provided       Pharmacy Information (if different than what is on RX)  Name:  Same  Phone:  Same
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
## 2023-03-08 NOTE — TELEPHONE ENCOUNTER
Writer received incoming call from pharmacistSangeeta updating provider that Subutex is not covered under patient's plan. The pharmacist did confirm that Suboxone brand is covered for max 3 tabs daily. Will start a PA.

## 2023-03-08 NOTE — TELEPHONE ENCOUNTER
Central Prior Authorization Team   Phone: [phone]      PA Initiation    Medication: buprenorphine (SUBUTEX) 2 MG SUBL  Insurance Company: [employer] - Phone [phone] Fax [phone]  Pharmacy Filling the Rx: [place], [place] - [address]  Filling Pharmacy Phone: [phone]  Filling Pharmacy Fax:    Start Date: 3/8/2023

## 2023-03-08 NOTE — PATIENT INSTRUCTIONS
**For crisis resources, please see the information at the end of this document**   Patient Education    Thank you for coming to the [facility] [place] [facility].     Lab Testing:  If you had lab testing today and your results are reassuring or normal they will be mailed to you or sent through [employer] within 7 days. If the lab tests need quick action we will call you with the results. The phone number we will call with results is # [phone]. If this is not the best number please call our clinic and change the number.     Medication Refills:  If you need any refills please call your pharmacy and they will contact us. Our fax number for refills is [phone].   Three business days of notice are needed for general medication refill requests.   Five business days of notice are needed for controlled substance refill requests.   If you need to change to a different pharmacy, please contact the new pharmacy directly. The new pharmacy will help you get your medications transferred.     Contact Us:  Please call [phone] during business hours (8-5:00 M-F).   If you have medication related questions after clinic hours, or on the weekend, please call [phone].     Financial Assistance [phone]   Medical Records [phone]       MENTAL HEALTH CRISIS RESOURCES:  For a emergency help, please call 911 or go to the nearest Emergency Department.     Emergency Walk-In Options:   Em[facility] Unit @ [facility] ([place]): [phone] - Specialized mental health emergency area designed to be calming  [facility] ([place]): [phone]  [facility] ([place]): [phone]  [facility]): [phone]    [facility] Crisis Information:   [place]: [phone]  [place]: [phone]  [place] ([place]) - Adult: [phone]     Child: [phone]  [place] - Adult: [phone]     Child: [phone]  [place]:  [phone]  List of all [facility] resources:   https://[place].gov/dhs/people-we-serve/adults/health-care/mental-health/resources/crisis-contacts.jsp    National Crisis Information:   Crisis Text Line: Text  [place]  to 171379  Suicide & Crisis Lifeline: 988  National Suicide Prevention Lifeline: 1-180-861-TALK (1-255.372.2904)       For online chat options, visit https://suicidepreventionlifeline.org/chat/  Poison Control Center: 6-779-798-1572  Trans Lifeline: 8-524-860-5371 - Hotline for transgender people of all ages  [employer]: 8-012-506-0731 - Hotline for LGBT youth     For Non-Emergency Support:   Fast Tracker: Mental Health & Substance Use Disorder Resources -   https://www.[employer].org/

## 2023-03-09 NOTE — TELEPHONE ENCOUNTER
Prior Authorization Approval    Authorization Effective Date: 2/8/2023  Authorization Expiration Date: 3/8/2024  Medication: buprenorphine (SUBUTEX) 2 MG SUBL  Approved Dose/Quantity:   Reference #:     Insurance Company: [employer] - Phone [phone] Fax [phone]  Expected CoPay:       CoPay Card Available:      Foundation Assistance Needed:    Which Pharmacy is filling the prescription (Not needed for infusion/clinic administered): [facility] [place], [place] - [address]  Pharmacy Notified: Yes  Patient Notified: No

## 2023-03-09 NOTE — TELEPHONE ENCOUNTER
Notified pharmacy that PA was approved. They will contact patient when the medication is ready to be picked up. Notified patient.

## 2023-03-10 NOTE — TELEPHONE ENCOUNTER
Reached out to patient to update regarding approved PA for Subutex. No answer at number provided. Unable to LVM due to no option of mailbox.

## 2023-03-14 ENCOUNTER — OFFICE VISIT (OUTPATIENT)
Dept: INTERNAL MEDICINE | Facility: CLINIC | Age: 48
End: 2023-03-14
Payer: COMMERCIAL

## 2023-03-14 VITALS
HEIGHT: 66 IN | HEART RATE: 92 BPM | SYSTOLIC BLOOD PRESSURE: 146 MMHG | BODY MASS INDEX: 35.77 KG/M2 | OXYGEN SATURATION: 94 % | WEIGHT: 222.6 LBS | DIASTOLIC BLOOD PRESSURE: 100 MMHG

## 2023-03-14 DIAGNOSIS — F41.8 DEPRESSION WITH ANXIETY: Primary | ICD-10-CM

## 2023-03-14 DIAGNOSIS — E11.9 TYPE 2 DIABETES MELLITUS WITHOUT COMPLICATION, WITHOUT LONG-TERM CURRENT USE OF INSULIN (H): ICD-10-CM

## 2023-03-14 DIAGNOSIS — I10 PRIMARY HYPERTENSION: ICD-10-CM

## 2023-03-14 PROCEDURE — 99214 OFFICE O/P EST MOD 30 MIN: CPT | Performed by: INTERNAL MEDICINE

## 2023-03-14 PROCEDURE — 99000 SPECIMEN HANDLING OFFICE-LAB: CPT | Performed by: PATHOLOGY

## 2023-03-14 PROCEDURE — 82570 ASSAY OF URINE CREATININE: CPT | Mod: 90 | Performed by: PATHOLOGY

## 2023-03-14 PROCEDURE — 82043 UR ALBUMIN QUANTITATIVE: CPT | Mod: 90 | Performed by: PATHOLOGY

## 2023-03-14 RX ORDER — LISINOPRIL 10 MG/1
10 TABLET ORAL DAILY
Qty: 30 TABLET | Refills: 0 | Status: SHIPPED | OUTPATIENT
Start: 2023-03-14 | End: 2023-04-19

## 2023-03-14 RX ORDER — BUPROPION HYDROCHLORIDE 300 MG/1
300 TABLET ORAL EVERY MORNING
Qty: 90 TABLET | Refills: 3 | Status: SHIPPED | OUTPATIENT
Start: 2023-03-14 | End: 2023-06-14

## 2023-03-14 NOTE — PATIENT INSTRUCTIONS
Take a look at your Seroquel prescription and let us know what dose pills you have and how many of the pills you are taking daily.

## 2023-03-14 NOTE — LETTER
March 16, 2023      Ajith Delvalle  [address]    [place] [address]        Dear ,    We are writing to inform you of your test results.    Your urine protein level is normal.  We'll continue monitoring this once per year to check for early signs of kidney damage from diabetes.     Thanks,  John Clarke MD     Resulted Orders   Albumin Random Urine Quantitative with Creat Ratio   Result Value Ref Range    Creatinine Urine mg/dL 265.0 mg/dL      Comment:      The reference ranges have not been established in urine creatinine. The results should be integrated into the clinical context for interpretation.    Albumin Urine mg/L 29.8 mg/L      Comment:      The reference ranges have not been established in urine albumin. The results should be integrated into the clinical context for interpretation.    Albumin Urine mg/g Cr 11.25 0.00 - 17.00 mg/g Cr      Comment:      Microalbuminuria is defined as an albumin:creatinine ratio of 17 to 299 for males and 25 to 299 for females. A ratio of albumin:creatinine of 300 or higher is indicative of overt proteinuria.  Due to biologic variability, positive results should be confirmed by a second, first-morning random or 24-hour timed urine specimen. If there is discrepancy, a third specimen is recommended. When 2 out of 3 results are in the microalbuminuria range, this is evidence for incipient nephropathy and warrants increased efforts at glucose control, blood pressure control, and institution of therapy with an angiotensin-converting-enzyme (ACE) inhibitor (if the patient can tolerate it).         If you have any questions or concerns, please call the clinic at the number listed above.       Sincerely,      John Clarke MD

## 2023-03-14 NOTE — NURSING NOTE
Ajith Delvalle is a 48 year old male patient that presents today in clinic for the following:    Chief Complaint   Patient presents with     RECHECK     Routine follow up     The patient's allergies and medications were reviewed as noted. A set of vitals were recorded as noted without incident. The patient does not have any other questions for the provider.    Giancarlo Ramos, EMT at 6:21 PM on 3/14/2023

## 2023-03-14 NOTE — PROGRESS NOTES
"  Assessment & Plan     Depression with anxiety    Ajith saw psychiatry last week, and the plan was to decrease Seroquel to 150mg daily and continue Wellbutrin 300mg.  Ajith's med lists states Seroquel 150mg tablet take 200mg daily, so I asked him to try to clarify what dose he is taking, but he wasn't sure what dose he got when he last filled his prescription.  Asked him to check his bottle at home and let us know.  Current Wellbutrin dose refilled.  Follow-up with psychiatry as scheduled next month.     - buPROPion (WELLBUTRIN XL) 300 MG 24 hr tablet; Take 1 tablet (300 mg) by mouth every morning    Primary hypertension    BPs at home have mostly been elevated, so recommended medication.  He is no longer taking clonidine for anxiety and wonders if he should take this for BP or the lisinopril he was previously on.  Recommended the lisinopril, so we'll restart this.  Recheck labs and RN BP visit in 2 weeks.     - lisinopril (ZESTRIL) 10 MG tablet; Take 1 tablet (10 mg) by mouth daily  - Basic metabolic panel  (Ca, Cl, CO2, Creat, Gluc, K, Na, BUN); Future    Type 2 diabetes mellitus without complication, without long-term current use of insulin (H)    Ajith says the pharmacy didn't have glucometers in stock, so he was not able to start checking home blood sugars yet.  He has made a lot of positive dietary changes however.  We'll recheck A1C in 2 months.  Will try to collect microalbumin again today.     John Clarke MD  [facility] INTERNAL MEDICINE [facility]   Ajith is a 48 year old, presenting for the following health issues:  RECHECK (Routine follow up)      HPI     I saw Ajith last on 2/13: \"Ajith feels that mood has improved since resuming Wellbutrin but he'd like to try increasing the dose, so we'll go from 150mg to 300mg.  He would like to start tapering down/off Seroquel (weight gain), so we'll decrease from 300mg to 200mg.  Follow-up in 1 month.\"    He saw " "psychiatry on 3/8 and they decreased Seroquel to 150mg (though prescription on list says 150mg tablets and take 200mg daily and plan to follow-up in 1 month, which is scheduled on 4/5.      We also discussed a new diagnosis of diabetes with A1C of 6.7 and planned to work on lifestyle change and recheck in 3 months.  We are considering starting an ACE-I if BP remains high.  Urine testing was ordered at last visit but not yet done.     Today, Ajith reports that home blood sugars are not being checked because the pharmacy didn't have a glucometer in stock when he went.  He has made a significant amount of diet changes though.  Blood pressures have been good at home, around 126-145/, maybe more above 130 than under.  He has not been taking clonidine for anxiety recently.         Ajith feels that mood is doing well.  I asked him about the Seroquel prescription to clarify what dose he is taking, but he doesn't seem to sure.            Review of Systems   Constitutional, psych, endo systems are negative, except as otherwise noted.      Objective    BP (!) 146/100 (BP Location: Right arm, Patient Position: Sitting, Cuff Size: Adult Regular)   Pulse 92   Ht 1.674 m (5' 5.91\")   Wt 101 kg (222 lb 9.6 oz)   SpO2 94%   BMI 36.03 kg/m    Body mass index is 36.03 kg/m .  Physical Exam   GENERAL: healthy, alert and no distress  PSYCH: mentation appears normal, affect normal/bright                     "

## 2023-03-15 LAB
CREAT UR-MCNC: 265 MG/DL
MICROALBUMIN UR-MCNC: 29.8 MG/L
MICROALBUMIN/CREAT UR: 11.25 MG/G CR (ref 0–17)

## 2023-03-31 ENCOUNTER — LAB (OUTPATIENT)
Dept: LAB | Facility: CLINIC | Age: 48
End: 2023-03-31
Payer: COMMERCIAL

## 2023-03-31 ENCOUNTER — ALLIED HEALTH/NURSE VISIT (OUTPATIENT)
Dept: INTERNAL MEDICINE | Facility: CLINIC | Age: 48
End: 2023-03-31
Payer: COMMERCIAL

## 2023-03-31 DIAGNOSIS — D64.9 ANEMIA, UNSPECIFIED TYPE: ICD-10-CM

## 2023-03-31 DIAGNOSIS — I10 HYPERTENSION, UNSPECIFIED TYPE: Primary | ICD-10-CM

## 2023-03-31 DIAGNOSIS — I10 PRIMARY HYPERTENSION: ICD-10-CM

## 2023-03-31 LAB
ANION GAP SERPL CALCULATED.3IONS-SCNC: 8 MMOL/L (ref 7–15)
BUN SERPL-MCNC: 8 MG/DL (ref 6–20)
CALCIUM SERPL-MCNC: 9.1 MG/DL (ref 8.6–10)
CHLORIDE SERPL-SCNC: 105 MMOL/L (ref 98–107)
CREAT SERPL-MCNC: 0.76 MG/DL (ref 0.67–1.17)
DEPRECATED HCO3 PLAS-SCNC: 27 MMOL/L (ref 22–29)
ERYTHROCYTE [DISTWIDTH] IN BLOOD BY AUTOMATED COUNT: 13.6 % (ref 10–15)
FERRITIN SERPL-MCNC: 89 NG/ML (ref 31–409)
GFR SERPL CREATININE-BSD FRML MDRD: >90 ML/MIN/1.73M2
GLUCOSE SERPL-MCNC: 100 MG/DL (ref 70–99)
HCT VFR BLD AUTO: 34 % (ref 40–53)
HGB BLD-MCNC: 12.1 G/DL (ref 13.3–17.7)
IRON BINDING CAPACITY (ROCHE): 326 UG/DL (ref 240–430)
IRON SATN MFR SERPL: 24 % (ref 15–46)
IRON SERPL-MCNC: 77 UG/DL (ref 61–157)
MCH RBC QN AUTO: 27.9 PG (ref 26.5–33)
MCHC RBC AUTO-ENTMCNC: 35.6 G/DL (ref 31.5–36.5)
MCV RBC AUTO: 79 FL (ref 78–100)
PLATELET # BLD AUTO: 304 10E3/UL (ref 150–450)
POTASSIUM SERPL-SCNC: 3.8 MMOL/L (ref 3.4–5.3)
RBC # BLD AUTO: 4.33 10E6/UL (ref 4.4–5.9)
SODIUM SERPL-SCNC: 140 MMOL/L (ref 136–145)
WBC # BLD AUTO: 5.1 10E3/UL (ref 4–11)

## 2023-03-31 PROCEDURE — 99207 PR NO CHARGE NURSE ONLY: CPT

## 2023-03-31 PROCEDURE — 99000 SPECIMEN HANDLING OFFICE-LAB: CPT | Performed by: PATHOLOGY

## 2023-03-31 PROCEDURE — 83550 IRON BINDING TEST: CPT | Performed by: PATHOLOGY

## 2023-03-31 PROCEDURE — 80048 BASIC METABOLIC PNL TOTAL CA: CPT | Performed by: PATHOLOGY

## 2023-03-31 PROCEDURE — 82728 ASSAY OF FERRITIN: CPT | Mod: 90 | Performed by: PATHOLOGY

## 2023-03-31 PROCEDURE — 83540 ASSAY OF IRON: CPT | Performed by: PATHOLOGY

## 2023-03-31 PROCEDURE — 85027 COMPLETE CBC AUTOMATED: CPT | Performed by: PATHOLOGY

## 2023-03-31 PROCEDURE — 36415 COLL VENOUS BLD VENIPUNCTURE: CPT | Performed by: PATHOLOGY

## 2023-03-31 NOTE — PROGRESS NOTES
Triple Blood Pressure Check Visit    Ajith Delvalle presents in clinic today for blood pressure monitoring. The patient was greeted and escorted back to the room without incident. The patient's arm was elevated to heart level and they were instructed to uncross their legs. A triple blood pressure AHA was preformed wherein the blood pressure machine took Ajith Delvalle's blood pressure over the course of ten minutes. The following were the individual blood pressures that were observed at today's visit:    (1) 141/93    (2) 142/93    (3) 142/93    The average blood pressure from today's individual readings were 142/93. The results of today's visit were communicated to the ordering provider.    Pt was on the phone and sounded a bit distressed. They wanted it to be noted that personal matters may cause his blood pressure to increase    Maddie Littlejohn, EMT at 1:35 PM on 3/31/2023.     sensation is normal and strength is normal. sensation is normal and strength is normal. CNs 2-12 grossly intact.

## 2023-04-03 DIAGNOSIS — D64.9 ANEMIA, UNSPECIFIED TYPE: Primary | ICD-10-CM

## 2023-04-05 ENCOUNTER — VIRTUAL VISIT (OUTPATIENT)
Dept: PSYCHIATRY | Facility: CLINIC | Age: 48
End: 2023-04-05
Attending: PSYCHIATRY & NEUROLOGY
Payer: COMMERCIAL

## 2023-04-05 DIAGNOSIS — F41.8 DEPRESSION WITH ANXIETY: ICD-10-CM

## 2023-04-05 DIAGNOSIS — F11.90 OPIOID USE DISORDER: ICD-10-CM

## 2023-04-05 PROCEDURE — 99214 OFFICE O/P EST MOD 30 MIN: CPT | Mod: GC | Performed by: HOSPITALIST

## 2023-04-05 PROCEDURE — G0463 HOSPITAL OUTPT CLINIC VISIT: HCPCS | Mod: PN,TEL | Performed by: HOSPITALIST

## 2023-04-05 RX ORDER — CLONIDINE HYDROCHLORIDE 0.1 MG/1
0.1 TABLET ORAL 2 TIMES DAILY
Qty: 180 TABLET | Refills: 3 | Status: CANCELLED | OUTPATIENT
Start: 2023-04-05

## 2023-04-05 RX ORDER — BUPRENORPHINE 2 MG/1
4 TABLET SUBLINGUAL 2 TIMES DAILY
Qty: 110 TABLET | Refills: 0 | Status: SHIPPED | OUTPATIENT
Start: 2023-04-05 | End: 2023-05-03

## 2023-04-05 RX ORDER — QUETIAPINE FUMARATE 150 MG/1
150 TABLET, FILM COATED ORAL DAILY
Qty: 60 TABLET | Refills: 0 | Status: SHIPPED | OUTPATIENT
Start: 2023-04-05 | End: 2023-06-14

## 2023-04-05 RX ORDER — BUPROPION HYDROCHLORIDE 300 MG/1
300 TABLET ORAL EVERY MORNING
Qty: 90 TABLET | Refills: 3 | Status: CANCELLED | OUTPATIENT
Start: 2023-04-05

## 2023-04-05 ASSESSMENT — PATIENT HEALTH QUESTIONNAIRE - PHQ9
SUM OF ALL RESPONSES TO PHQ QUESTIONS 1-9: 0
SUM OF ALL RESPONSES TO PHQ QUESTIONS 1-9: 0
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
## 2023-04-05 NOTE — NURSING NOTE
Is the patient currently in the state of [place]? YES    Visit mode:TELEPHONE    If the visit is dropped, the patient can be reconnected by: TELEPHONE VISIT: Phone number:   Telephone Information:   Mobile [phone]       Will anyone else be joining the visit? No  (If patient encounters technical issues they should call [phone])    How would you like to obtain your AVS? MyChart    Are changes needed to the allergy or medication list? NO   Patient declined individual  medication review by support staff because because nothing has changed.    Reason for visit:  follow up    SHAKA Chicas

## 2023-04-06 DIAGNOSIS — F11.90 OPIOID USE DISORDER: ICD-10-CM

## 2023-04-06 RX ORDER — BUPRENORPHINE 2 MG/1
4 TABLET SUBLINGUAL 2 TIMES DAILY
Qty: 110 TABLET | Refills: 0 | Status: CANCELLED | OUTPATIENT
Start: 2023-04-06

## 2023-04-07 ENCOUNTER — TELEPHONE (OUTPATIENT)
Dept: PSYCHIATRY | Facility: CLINIC | Age: 48
End: 2023-04-07
Payer: COMMERCIAL

## 2023-04-07 NOTE — TELEPHONE ENCOUNTER
Last seen: 4/5  RTC: 6-8 weeks   Cancel: none   No-show: none   Next appt: 5/24    Incoming refill from patientn via phone     Disp Refills Start End LORRAINE   buprenorphine (SUBUTEX) 2 MG SUBL sublingual tablet 110 tablet 0 4/5/2023 5/3/2023 No   Sig - Route: Place 2 tablets (4 mg) under the tongue 2 times daily for 28 days - Sublingual   Sent to pharmacy as: Buprenorphine HCl 2 MG Sublingual Tablet Sublingual (SUBUTEX)   Class: E-Prescribe   Order: [id number]   E-Prescribing Status: Receipt confirmed by pharmacy (4/5/2023 12:51 PM CDT)   Per  last refilled: 3/8 #110, 10/20 #14, 9/8 #14    Per chart review, patient should have refills on file at the pharmacy. Attempted to reach out to patient to relay regarding refills. No answer at number provided. Unable to LVM due to mailbox not set up.

## 2023-04-07 NOTE — TELEPHONE ENCOUNTER
[facility] Call Center    Phone Message    May a detailed message be left on voicemail: yes     Reason for Call: Medication Refill Request    Has the patient contacted the pharmacy for the refill? Yes   Name of medication being requested: buprenorphine (SUBUTEX) 2 MG SUBL sublingual tablet  Provider who prescribed the medication: Dr. Diamond  Pharmacy: [place], [place] - [address]  Date medication is needed: ASAP     Action Taken: Other: nursing pool    Travel Screening: Not Applicable

## 2023-04-10 ENCOUNTER — TELEPHONE (OUTPATIENT)
Dept: PSYCHIATRY | Facility: CLINIC | Age: 48
End: 2023-04-10
Payer: COMMERCIAL

## 2023-04-10 NOTE — TELEPHONE ENCOUNTER
Writer received incoming call from patient wanting to confirm which formulation of Subutex he was prescribed. Patient shared he is allergic to Suboxone. Updated him that provider has prescribed him Subutex. Patient shared the last time he received Suboxone the pill was orange and oval and this time he was dispensed the same med. Writer agreed to reach out to pharmacy to confirm.     Reached out to [place], [place] - [address] [place] [address] [id number] and spoke with pharmacistMary who confirmed patient was dispensed Subutex by [facility] . The pharmacist confirmed the  changed from what patient was dispensed last time. Relayed above information to patient.     No further action needed by this writer

## 2023-04-17 DIAGNOSIS — I10 PRIMARY HYPERTENSION: ICD-10-CM

## 2023-04-19 NOTE — TELEPHONE ENCOUNTER
LISINOPRIL 10MG TABS      Last Written Prescription Date:  3/14/23  Last Fill Quantity: 30,   # refills: 0  Last Office Visit : 3/14/23  Future Office visit:  5/17/23    Routing refill request to provider for review/approval because:  Blood pressure out of range   Last refill limited to 30 days    BP Readings from Last 3 Encounters:   03/14/23 (!) 146/100   02/13/23 (!) 160/106   01/09/23 121/81

## 2023-04-20 RX ORDER — LISINOPRIL 10 MG/1
10 TABLET ORAL DAILY
Qty: 30 TABLET | Refills: 0 | Status: SHIPPED | OUTPATIENT
Start: 2023-04-20 | End: 2023-06-22

## 2023-05-04 DIAGNOSIS — F11.90 OPIOID USE DISORDER: ICD-10-CM

## 2023-05-04 RX ORDER — BUPRENORPHINE HYDROCHLORIDE AND NALOXONE HYDROCHLORIDE DIHYDRATE 8; 2 MG/1; MG/1
TABLET SUBLINGUAL
Status: CANCELLED | OUTPATIENT
Start: 2023-05-04

## 2023-05-04 RX ORDER — BUPRENORPHINE 2 MG/1
4 TABLET SUBLINGUAL 2 TIMES DAILY
Qty: 110 TABLET | Refills: 0 | Status: CANCELLED | OUTPATIENT
Start: 2023-05-04

## 2023-05-04 RX ORDER — BUPRENORPHINE 8 MG/1
8 TABLET SUBLINGUAL 2 TIMES DAILY
Qty: 60 TABLET | Refills: 0 | Status: SHIPPED | OUTPATIENT
Start: 2023-05-04 | End: 2023-06-05

## 2023-05-04 NOTE — TELEPHONE ENCOUNTER
[facility] Call Center    Phone Message    May a detailed message be left on voicemail: yes     Reason for Call: Other: Would like to be contacted by RN concerning medication changes, said it was urgent and as wondering if he could be seen prior to scheduled appointment with Dr. Jung on May 24th.     Action Taken: Message routed to:  Other: P PSYCHIATRY NURSE-[facility]    Travel Screening: Not Applicable

## 2023-05-04 NOTE — TELEPHONE ENCOUNTER
Patient supposed to see once a month and there was no openings for this month. He has some concerns but they are private and he is around others right now. He will call back when he is in a more private location.

## 2023-05-04 NOTE — TELEPHONE ENCOUNTER
Patient called and said that he is having increased cravings and would like to increase his dose of Subutex. He would also like to discuss medication for impotence as he is having a hard time getting an erection due to his medications. He would like to see if there is any sooner appointments, none are available at this time. Message sent to provider.         Last filled on 4/10/23 he said he has 4 pills left. He thinks he may have taken more because of cravings.

## 2023-05-04 NOTE — TELEPHONE ENCOUNTER
Patient called back to speak to RN after getting disconnected. RN was unavailable. Patient asked to be called back on his mobile phone around 12pm today as he is currently in group.

## 2023-05-05 ENCOUNTER — TELEPHONE (OUTPATIENT)
Dept: PSYCHIATRY | Facility: CLINIC | Age: 48
End: 2023-05-05
Payer: COMMERCIAL

## 2023-05-05 NOTE — TELEPHONE ENCOUNTER
Daniel Gonzalez,   I have increased subutex dose from 4 mg BID to 8 mg BID to help with increased cravings. I will see him at our next visit to discuss the sexual side effects. Otherwise,  I recommend that he see his primary physician to discuss.     Cristin Jung MD

## 2023-05-05 NOTE — TELEPHONE ENCOUNTER
[facility] Prior Authorization Team Request    Medication: BUPRENORPHINE HCL 8MG SUBL  Dosing:   NDC (required for Medicaid members): [address]-23    Insurance   BIN: [id number]  [id number]  Grp: [id number]  ID: [id number]    CoverMyMeds Key (if applicable):     Additional documentation:     Filling Pharmacy: [facility]  Phone Number: [phone]  Contact: P PHARM [employer]ERS[facility] PA (P [id number]) please send all responses to this pool.  Pharmacy NPI (required for Medicaid members): [id number]

## 2023-05-08 NOTE — TELEPHONE ENCOUNTER
Patient called to speak to RN, but RN was unavailable. Patient stated he contacted the pharmacy to  his Buprenorphine 8mg but they said there was a delay because of his insurance. Patient stated he did not call yet today so writer transferred him to pharmacy. Patient asked for a call back from RN to discuss.

## 2023-05-08 NOTE — TELEPHONE ENCOUNTER
No response via Cover My Meds.   Writer called insurance at [phone] and spoke with Ciara, they have received request to expedite this PA, but no determination is available at this time.   Will update Dr Jung.

## 2023-05-08 NOTE — TELEPHONE ENCOUNTER
Called and spoke to pharmacy and and he needs a PA - PA was started by pharmacy and is not back yet.  Writer tried to call [phone] but they ae closed for 1 hours for lunch. Will try back later.

## 2023-05-08 NOTE — TELEPHONE ENCOUNTER
Called to follow up on PA and they have not received it. New PA started in Cover my meds. They will watch for it to expadite it on their end today.

## 2023-05-09 NOTE — TELEPHONE ENCOUNTER
Received call from pharmacy and the PA had a number off in the ID number. PA corrected and resubmitted.

## 2023-05-09 NOTE — TELEPHONE ENCOUNTER
[phone] and spoke with Jo, they have received the PA with expedited review. She shared the due date for the PA is 5/10 at 9 am. Updated her that patient has been out of meds and starting to experience angie cravings and therefore a response today would be beneficial. She verbalized understanding and will have her team review the PA today. Will wait for a response.

## 2023-05-09 NOTE — TELEPHONE ENCOUNTER
Writer received incoming call from patient wanting an update on PA for subutex. Updated him the determination should be made by 5/10 at 9 am. Patient verbalized understanding.

## 2023-05-09 NOTE — TELEPHONE ENCOUNTER
Writer received incoming call from patient wanting an update on PA for Subutex. Updated him that we are still waiting on a response from insurance. He also agreed to call the insurance to explain his situation and the reason for it to be expedited. Patient is willing to take a lower does of 4 mg BID if 8 mg BID is not approved. Patient has been out of meds since the past 2 days and is starting to experience cravings. He is able to keep self sober for now but does need medications ASAP.

## 2023-05-10 NOTE — TELEPHONE ENCOUNTER
Central Prior Authorization Team   Phone: [phone]      Determination for PA was faxed to [employer] PA Team's fax number, copy and pasting and routing to the clinic so that they can alert the pharmacy and patient.

## 2023-05-10 NOTE — TELEPHONE ENCOUNTER
Writer notified by fellow nurse, Lisa WARD RN that PA for Subutex was approved in cover my meds.     Writer reached out to patient on 5/9 and updated him the PA was approved. He agreed to get his meds refills and start them ASAP.

## 2023-05-17 ENCOUNTER — OFFICE VISIT (OUTPATIENT)
Dept: INTERNAL MEDICINE | Facility: CLINIC | Age: 48
End: 2023-05-17
Payer: COMMERCIAL

## 2023-05-17 ENCOUNTER — LAB (OUTPATIENT)
Dept: LAB | Facility: CLINIC | Age: 48
End: 2023-05-17
Payer: COMMERCIAL

## 2023-05-17 VITALS
WEIGHT: 232 LBS | DIASTOLIC BLOOD PRESSURE: 77 MMHG | BODY MASS INDEX: 37.28 KG/M2 | OXYGEN SATURATION: 94 % | SYSTOLIC BLOOD PRESSURE: 115 MMHG | HEART RATE: 64 BPM | HEIGHT: 66 IN

## 2023-05-17 DIAGNOSIS — D64.9 ANEMIA, UNSPECIFIED TYPE: ICD-10-CM

## 2023-05-17 DIAGNOSIS — N52.2 DRUG-INDUCED ERECTILE DYSFUNCTION: Primary | ICD-10-CM

## 2023-05-17 DIAGNOSIS — E11.9 TYPE 2 DIABETES MELLITUS WITHOUT COMPLICATION, WITHOUT LONG-TERM CURRENT USE OF INSULIN (H): ICD-10-CM

## 2023-05-17 DIAGNOSIS — I10 PRIMARY HYPERTENSION: ICD-10-CM

## 2023-05-17 LAB
BASOPHILS # BLD AUTO: 0 10E3/UL (ref 0–0.2)
BASOPHILS NFR BLD AUTO: 1 %
EOSINOPHIL # BLD AUTO: 0.2 10E3/UL (ref 0–0.7)
EOSINOPHIL NFR BLD AUTO: 4 %
ERYTHROCYTE [DISTWIDTH] IN BLOOD BY AUTOMATED COUNT: 13 % (ref 10–15)
HCT VFR BLD AUTO: 31.1 % (ref 40–53)
HGB BLD-MCNC: 11 G/DL (ref 13.3–17.7)
IMM GRANULOCYTES # BLD: 0 10E3/UL
IMM GRANULOCYTES NFR BLD: 1 %
LDH SERPL L TO P-CCNC: 238 U/L (ref 0–250)
LYMPHOCYTES # BLD AUTO: 1.7 10E3/UL (ref 0.8–5.3)
LYMPHOCYTES NFR BLD AUTO: 45 %
MCH RBC QN AUTO: 27.5 PG (ref 26.5–33)
MCHC RBC AUTO-ENTMCNC: 35.4 G/DL (ref 31.5–36.5)
MCV RBC AUTO: 78 FL (ref 78–100)
MONOCYTES # BLD AUTO: 0.6 10E3/UL (ref 0–1.3)
MONOCYTES NFR BLD AUTO: 17 %
NEUTROPHILS # BLD AUTO: 1.1 10E3/UL (ref 1.6–8.3)
NEUTROPHILS NFR BLD AUTO: 32 %
NRBC # BLD AUTO: 0 10E3/UL
NRBC BLD AUTO-RTO: 0 /100
PLATELET # BLD AUTO: 220 10E3/UL (ref 150–450)
RBC # BLD AUTO: 4 10E6/UL (ref 4.4–5.9)
RETICS # AUTO: 0.04 10E6/UL (ref 0.03–0.1)
RETICS/RBC NFR AUTO: 1.1 % (ref 0.5–2)
WBC # BLD AUTO: 3.6 10E3/UL (ref 4–11)

## 2023-05-17 PROCEDURE — 82607 VITAMIN B-12: CPT | Mod: 90 | Performed by: PATHOLOGY

## 2023-05-17 PROCEDURE — 83010 ASSAY OF HAPTOGLOBIN QUANT: CPT | Mod: 90 | Performed by: PATHOLOGY

## 2023-05-17 PROCEDURE — 80076 HEPATIC FUNCTION PANEL: CPT | Mod: 90 | Performed by: PATHOLOGY

## 2023-05-17 PROCEDURE — 36415 COLL VENOUS BLD VENIPUNCTURE: CPT | Performed by: PATHOLOGY

## 2023-05-17 PROCEDURE — 83615 LACTATE (LD) (LDH) ENZYME: CPT | Performed by: PATHOLOGY

## 2023-05-17 PROCEDURE — 83036 HEMOGLOBIN GLYCOSYLATED A1C: CPT | Mod: 90 | Performed by: PATHOLOGY

## 2023-05-17 PROCEDURE — 83021 HEMOGLOBIN CHROMOTOGRAPHY: CPT | Mod: 90 | Performed by: PATHOLOGY

## 2023-05-17 PROCEDURE — 99000 SPECIMEN HANDLING OFFICE-LAB: CPT | Performed by: PATHOLOGY

## 2023-05-17 PROCEDURE — 85060 BLOOD SMEAR INTERPRETATION: CPT | Mod: GC | Performed by: PATHOLOGY

## 2023-05-17 PROCEDURE — 85045 AUTOMATED RETICULOCYTE COUNT: CPT | Performed by: PATHOLOGY

## 2023-05-17 PROCEDURE — 83020 HEMOGLOBIN ELECTROPHORESIS: CPT | Mod: 90 | Performed by: PATHOLOGY

## 2023-05-17 PROCEDURE — 85025 COMPLETE CBC W/AUTO DIFF WBC: CPT | Performed by: PATHOLOGY

## 2023-05-17 PROCEDURE — 99214 OFFICE O/P EST MOD 30 MIN: CPT | Performed by: INTERNAL MEDICINE

## 2023-05-17 RX ORDER — SILDENAFIL CITRATE 20 MG/1
40-60 TABLET ORAL DAILY PRN
Qty: 30 TABLET | Refills: 3 | Status: SHIPPED | OUTPATIENT
Start: 2023-05-17 | End: 2023-06-23

## 2023-05-17 NOTE — PROGRESS NOTES
Assessment & Plan     Drug-induced erectile dysfunction    Ajith has been having some ED issues, likely secondary to his medications.  Epic formulation shows brand Viagra and Cialis not covered, but generic sildenafil may be with prior auth, so prescription sent.     - sildenafil (REVATIO) 20 MG tablet; Take 2-3 tablets (40-60 mg) by mouth daily as needed (1-4 hours before sexual activity)    Type 2 diabetes mellitus without complication, without long-term current use of insulin (H)    Home blood sugars have been good. Recheck A1C today.     - Hemoglobin A1c; Future    Primary hypertension    BP looks great today, continue current meds    Anemia, unspecified type    Has pending lab orders for further work-up; will get these today.  Advised him to resend form from the plasma center as I don't see that we received this.     *    Follow-up pending lab results.        John Clarke MD  [facility] INTERNAL MEDICINE [facility]   Ajith is a 48 year old, presenting for the following health issues:  RECHECK (ED medications)    HPI     I saw Ajith last on 3/14 and we resumed lisinopril for HTN.  Recheck on 3/31 was still high, but he reported significant stress that day, so recommended returning for another BP recheck which is not yet done.  Hgb was low so additional labs ordered but not yet done.       Planned to rechecked A1C today.  Home blood sugars have been around 106.      He saw psychiatry on 4/5 and Subutex was increased, and he noted mood was currently good so he was continued on Wellbutrin 300mg and Seroquel 150mg.  Subutex was again increased via refill encounter 5/4. He noted trouble with ED due to medications and would like to try an ED medication.      BP looks good today at 115/77.  He's been working out.      He says he sent us paperwork twice to fill out a form for the plasma center regarding his low hemoglobin/iron.  I don't see any documentation in his chart of this-  "will have him send again.         Review of Systems   Constitutional, CV systems are negative, except as otherwise noted.      Objective    /77 (BP Location: Right arm, Patient Position: Sitting, Cuff Size: Adult Large)   Pulse 64   Ht 1.674 m (5' 5.91\")   Wt 105.2 kg (232 lb)   SpO2 94%   BMI 37.55 kg/m    Body mass index is 37.55 kg/m .  Physical Exam   GENERAL: no distress and fatigued and almost falling asleep- he says he was up late last night helping his son with his car  PSYCH: mentation appears normal and affect normal/bright               "

## 2023-05-17 NOTE — NURSING NOTE
Ajith Delvalle is a 48 year old male patient that presents today in clinic for the following:    Chief Complaint   Patient presents with     RECHECK     ED medications     The patient's allergies and medications were reviewed as noted. A set of vitals were recorded as noted without incident. The patient does not have any other questions for the provider.    Giancarlo Ramos, EMT at 4:18 PM on 5/17/2023

## 2023-05-18 DIAGNOSIS — D64.9 ANEMIA, UNSPECIFIED TYPE: Primary | ICD-10-CM

## 2023-05-18 LAB
ALBUMIN SERPL BCG-MCNC: 4 G/DL (ref 3.5–5.2)
ALP SERPL-CCNC: 79 U/L (ref 40–129)
ALT SERPL W P-5'-P-CCNC: 39 U/L (ref 10–50)
AST SERPL W P-5'-P-CCNC: 33 U/L (ref 10–50)
BILIRUB DIRECT SERPL-MCNC: <0.2 MG/DL (ref 0–0.3)
BILIRUB SERPL-MCNC: 0.5 MG/DL
HAPTOGLOB SERPL-MCNC: 103 MG/DL (ref 32–197)
HBA1C MFR BLD: 6.1 %
PATH REPORT.COMMENTS IMP SPEC: NORMAL
PATH REPORT.FINAL DX SPEC: NORMAL
PATH REPORT.MICROSCOPIC SPEC OTHER STN: NORMAL
PATH REPORT.MICROSCOPIC SPEC OTHER STN: NORMAL
PATH REPORT.RELEVANT HX SPEC: NORMAL
PROT SERPL-MCNC: 7.4 G/DL (ref 6.4–8.3)
VIT B12 SERPL-MCNC: 318 PG/ML (ref 232–1245)

## 2023-05-19 ENCOUNTER — TELEPHONE (OUTPATIENT)
Dept: INTERNAL MEDICINE | Facility: CLINIC | Age: 48
End: 2023-05-19
Payer: COMMERCIAL

## 2023-05-19 NOTE — TELEPHONE ENCOUNTER
[facility] Prior Authorization Team Request    Medication: sildenafil 20mg tab  Dosing: take 2 to 3 tablets by mouth daily as needed 1 to 4 hours before sexual activity  Qty: 30  Day Supply: 10  NDC (required for Medicaid members): [address]-33     Insurance   BIN: [id number]  PCN: [id number]  Grp: [id number]  ID: [id number]    CoverMyMeds Key (if applicable):     Additional documentation:       Filling Pharmacy: [facility]  Phone Number: [phone]  Contact:    Pharmacy NPI (required for Medicaid members): [id number]

## 2023-05-23 PROBLEM — D58.2 HEMOGLOBIN C TRAIT (H): Status: ACTIVE | Noted: 2023-05-23

## 2023-05-23 NOTE — TELEPHONE ENCOUNTER
PRIOR AUTHORIZATION DENIED    Medication: SILDENAFIL CITRATE 20 MG PO TABS    Insurance Company: [employer] - Phone [phone] Fax [phone]    Denial Date: 5/23/2023    Denial Rational: Sexual dysfunction medications are excluded    Appeal Information:

## 2023-05-23 NOTE — TELEPHONE ENCOUNTER
PA Initiation    Medication: SILDENAFIL CITRATE 20 MG PO TABS  Insurance Company: [employer] - Phone [phone] Fax [phone]  Pharmacy Filling the Rx: [facility] [place], [place] - [address] [place] [address] [id number]  Filling Pharmacy Phone: [phone]  Filling Pharmacy Fax: [phone]  Start Date: 5/23/2023

## 2023-05-24 NOTE — TELEPHONE ENCOUNTER
Certaliahart message sent to patient that insurance doesn't cover ED meds.  Can pay out of pocket or try to find a [place] pharmacy which may have cheaper nguyen.    John Clarke MD

## 2023-05-26 ENCOUNTER — MYC MEDICAL ADVICE (OUTPATIENT)
Dept: INTERNAL MEDICINE | Facility: CLINIC | Age: 48
End: 2023-05-26
Payer: COMMERCIAL

## 2023-05-26 NOTE — LETTER
"5/26/2023       RE: Ajith Delvalle  [address]  [address]  [place] [address]     Dear Ajith,     I received a notification that you have not yet viewed your recent lab results on [facility].  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  This is a variation of the normal type of hemoglobin in the blood.  Since you have only hemoglobin C trait (one copy of the gene) instead of hemoglobin C disease (which would be two copies), this should not cause significant problems for you, but this could be passed down to your children. If your children also got a variation in the hemoglobin gene from their mother, this could cause a blood problem.  Please let me know if you'd like to see a genetic counselor to discuss this further.\"     John Clarke MD  "

## 2023-06-01 LAB
HEMOGLOBIN A2 QUANTITATION: 3.4 % (ref 2.2–3.5)
HEMOGLOBIN A: 61.4 % (ref 94.5–97.8)
HEMOGLOBIN ELECTROPHRESIS: ABNORMAL
HEMOGLOBIN F QUANTITATION: <0.5 % (ref 0–2)

## 2023-06-05 DIAGNOSIS — F11.90 OPIOID USE DISORDER: ICD-10-CM

## 2023-06-05 RX ORDER — BUPRENORPHINE 8 MG/1
8 TABLET SUBLINGUAL 2 TIMES DAILY
Qty: 14 TABLET | Refills: 0 | Status: SHIPPED | OUTPATIENT
Start: 2023-06-08 | End: 2023-06-14

## 2023-06-05 NOTE — TELEPHONE ENCOUNTER
[facility] Call [facility]    Phone Message    May a detailed message be left on voicemail: yes     Reason for Call: Medication Refill Request    Has the patient contacted the pharmacy for the refill? Yes   Name of medication being requested: Subutex  Provider who prescribed the medication: Dr. Jung  Pharmacy: [id number]  Date medication is needed: N/A         Action Taken: Message routed to:  Other: P PSYCHIATRY NURSE-P    Travel Screening: Not Applicable

## 2023-06-05 NOTE — TELEPHONE ENCOUNTER
Last seen: 4/5/23  RTC: 6-8 weeks  Cancel: none  No-show: 5/24/23  Next appt: 6/14/23     Incoming refill from Patient via [employer]    Medication requested:   Pending Prescriptions:                       Disp   Refills    buprenorphine (SUBUTEX) 8 MG SUBL subling*60 tab*0            Sig: Place 1 tablet (8 mg) under the tongue 2 times           daily      Last refill per       From chart note:   -Increase subutex from 2 mg to 4 mg BID     Medication sent to provider for review.

## 2023-06-06 NOTE — TELEPHONE ENCOUNTER
Patient no-showed to our previous appointment. Refilling one week of subutex to get patient to our appointment on 06/14.     Cristin Jung MD

## 2023-06-06 NOTE — TELEPHONE ENCOUNTER
mychart message not read.  Letter mailed.      Kendrick Grant CMA ([facility]) at 4:15 PM on 6/6/2023

## 2023-06-14 ENCOUNTER — VIRTUAL VISIT (OUTPATIENT)
Dept: PSYCHIATRY | Facility: CLINIC | Age: 48
End: 2023-06-14
Attending: PSYCHIATRY & NEUROLOGY
Payer: COMMERCIAL

## 2023-06-14 DIAGNOSIS — F11.90 OPIOID USE DISORDER: ICD-10-CM

## 2023-06-14 DIAGNOSIS — F41.8 DEPRESSION WITH ANXIETY: ICD-10-CM

## 2023-06-14 PROCEDURE — 99214 OFFICE O/P EST MOD 30 MIN: CPT | Mod: GC | Performed by: HOSPITALIST

## 2023-06-14 RX ORDER — BUPROPION HYDROCHLORIDE 300 MG/1
300 TABLET ORAL EVERY MORNING
Qty: 30 TABLET | Refills: 1 | Status: SHIPPED | OUTPATIENT
Start: 2023-06-14 | End: 2023-07-19

## 2023-06-14 RX ORDER — CLONIDINE HYDROCHLORIDE 0.1 MG/1
0.1 TABLET ORAL AT BEDTIME
Qty: 30 TABLET | Refills: 1 | Status: SHIPPED | OUTPATIENT
Start: 2023-06-14 | End: 2023-07-19

## 2023-06-14 RX ORDER — QUETIAPINE FUMARATE 150 MG/1
150 TABLET, FILM COATED ORAL DAILY
Qty: 30 TABLET | Refills: 1 | Status: SHIPPED | OUTPATIENT
Start: 2023-06-14 | End: 2023-07-19

## 2023-06-14 RX ORDER — BUPRENORPHINE 8 MG/1
8 TABLET SUBLINGUAL 2 TIMES DAILY
Qty: 60 TABLET | Refills: 1 | Status: SHIPPED | OUTPATIENT
Start: 2023-06-14 | End: 2023-07-19

## 2023-06-14 ASSESSMENT — PATIENT HEALTH QUESTIONNAIRE - PHQ9
10. IF YOU CHECKED OFF ANY PROBLEMS, HOW DIFFICULT HAVE THESE PROBLEMS MADE IT FOR YOU TO DO YOUR WORK, TAKE CARE OF THINGS AT HOME, OR GET ALONG WITH OTHER PEOPLE: NOT DIFFICULT AT ALL
SUM OF ALL RESPONSES TO PHQ QUESTIONS 1-9: 5
SUM OF ALL RESPONSES TO PHQ QUESTIONS 1-9: 5

## 2023-06-14 NOTE — PROGRESS NOTES
Virtual Visit Details    Type of service:  Video Visit   Video Start Time: 9:42 am  Video End Time:9:53 am    Originating Location (pt. Location): Home  Distant Location (provider location):  On-site  Platform used for Video Visit: Context app  Answers for HPI/ROS submitted by the patient on 6/14/2023  If you checked off any problems, how difficult have these problems made it for you to do your work, take care of things at home, or get along with other people?: Not difficult at all  PHQ9 TOTAL SCORE: 5

## 2023-06-14 NOTE — NURSING NOTE
Is the patient currently in the state of [place]? YES    Visit mode:VIDEO    If the visit is dropped, the patient can be reconnected by: VIDEO VISIT: Text to cell phone: [phone]    Will anyone else be joining the visit? NO      How would you like to obtain your AVS? MyChart    Are changes needed to the allergy or medication list? NO  Pt needs several refills.    Reason for visit: RECHECK    Due to time constraint qnrs not complete.    Kitty Townsend, MARLIN on 6/14/2023 at 9:24 AM

## 2023-06-14 NOTE — PATIENT INSTRUCTIONS
**For crisis resources, please see the information at the end of this document**   Patient Education    Thank you for coming to the [facility] [place] [facility].     Lab Testing:  If you had lab testing today and your results are reassuring or normal they will be mailed to you or sent through evolso within 7 days. If the lab tests need quick action we will call you with the results. The phone number we will call with results is # [phone]. If this is not the best number please call our clinic and change the number.     Medication Refills:  If you need any refills please call your pharmacy and they will contact us. Our fax number for refills is [phone].   Three business days of notice are needed for general medication refill requests.   Five business days of notice are needed for controlled substance refill requests.   If you need to change to a different pharmacy, please contact the new pharmacy directly. The new pharmacy will help you get your medications transferred.     Contact Us:  Please call [phone] during business hours (8-5:00 M-F).   If you have medication related questions after clinic hours, or on the weekend, please call [phone].     Financial Assistance [phone]   Medical Records [phone]       MENTAL HEALTH CRISIS RESOURCES:  For a emergency help, please call 911 or go to the nearest Emergency Department.     Emergency Walk-In Options:   EmPATH Unit @ [facility] ([place]): [phone] - Specialized mental health emergency area designed to be calming  [facility] ([place]): [phone]  [facility] ([place]): [phone]  [facility]): [phone]    [facility] Crisis Information:   [place]: [phone]  [place]: [phone]  [place] ([place]) - Adult: [phone]     Child: [phone]  [place] - Adult: [phone]     Child: [phone]  [place]:  [phone]  List of all [facility] resources:   https://[place].gov/dhs/people-we-serve/adults/health-care/mental-health/resources/crisis-contacts.jsp    National Crisis Information:   Crisis Text Line: Text  [place]  to 503956  Suicide & Crisis Lifeline: 988  National Suicide Prevention Lifeline: 2-602-140-TALK (1-636.655.3374)       For online chat options, visit https://suicidepreventionlifeline.org/chat/  Poison Control Center: 6-452-326-1685  Trans Lifeline: 9-934-607-8479 - Hotline for transgender people of all ages  The [employer]: 1-007-323-7388 - Hotline for LGBT youth     For Non-Emergency Support:   Fast Tracker: Mental Health & Substance Use Disorder Resources -   https://www.[employer].org/
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
## 2023-06-15 ENCOUNTER — TELEPHONE (OUTPATIENT)
Dept: PHARMACY | Facility: CLINIC | Age: 48
End: 2023-06-15
Payer: COMMERCIAL

## 2023-06-15 NOTE — TELEPHONE ENCOUNTER
Called patient to conduct a comprehensive medication review as requested by patients [facility] insurance. Scheduled for next Thursday.    Magnus Johnson Pharm. D., [facility]  Medication Therapy Management Pharmacist  Direct Voicemail: [phone]

## 2023-06-22 ENCOUNTER — VIRTUAL VISIT (OUTPATIENT)
Dept: PHARMACY | Facility: CLINIC | Age: 48
End: 2023-06-22
Payer: COMMERCIAL

## 2023-06-22 DIAGNOSIS — N52.2 DRUG-INDUCED ERECTILE DYSFUNCTION: ICD-10-CM

## 2023-06-22 DIAGNOSIS — D64.9 ANEMIA, UNSPECIFIED TYPE: ICD-10-CM

## 2023-06-22 DIAGNOSIS — I10 PRIMARY HYPERTENSION: ICD-10-CM

## 2023-06-22 DIAGNOSIS — D50.9 IRON DEFICIENCY ANEMIA, UNSPECIFIED IRON DEFICIENCY ANEMIA TYPE: ICD-10-CM

## 2023-06-22 DIAGNOSIS — F41.8 DEPRESSION WITH ANXIETY: ICD-10-CM

## 2023-06-22 DIAGNOSIS — E11.9 TYPE 2 DIABETES MELLITUS WITHOUT COMPLICATION, WITHOUT LONG-TERM CURRENT USE OF INSULIN (H): Primary | ICD-10-CM

## 2023-06-22 DIAGNOSIS — R52 PAIN: ICD-10-CM

## 2023-06-22 PROCEDURE — 99607 MTMS BY PHARM ADDL 15 MIN: CPT | Performed by: PHARMACIST

## 2023-06-22 PROCEDURE — 99605 MTMS BY PHARM NP 15 MIN: CPT | Performed by: PHARMACIST

## 2023-06-22 RX ORDER — IBUPROFEN 200 MG
200 TABLET ORAL EVERY 4 HOURS PRN
COMMUNITY

## 2023-06-22 NOTE — PROGRESS NOTES
Medication Therapy Management (MTM) Encounter    ASSESSMENT:                            Medication Adherence/Access: No issues identified    Type 2 Diabetes:    A1c at goal of <7%. Patient prefers not to take medications for blood glucose. Patient should start atorvastatin    Depression/Anxiety:   Stable.     Hypertension:   Last in clinic blood pressure was at goal of <130/80 mmHg.    Pain:  Stable.     ED:   Will [facility] as this will be much cheaper.     Supplements:  Patient may have better response from iron if he takes it every other day or adding vitamin C    PLAN:                            1. Sent sildenafil  Prescription to [facility]. Please go online and make an account.   2.  and start atorvastatin.   3. Take iron every other day or add vitamin C to help with absorption.     Follow-up: Return if symptoms worsen or fail to improve.    SUBJECTIVE/OBJECTIVE:                          Ajith Delvalle is a 48 year old male called for an initial visit. He was referred to me from [facility].      Reason for visit: CMR.    Allergies/ADRs: Reviewed in chart  Past Medical History: Reviewed in chart  Tobacco: He reports that he has quit smoking. His smoking use included cigarettes. He has never used smokeless tobacco.  Alcohol: none      Medication Adherence/Access: Reports that sildenafil is expensive for him. Misses doses once or twice.     Type 2 Diabetes:    Atorvastatin 20 mg for primary prevention. -doesn't have this medication    Aspirin: no  Blood sugar monitoring: one time(s) daily. Ranges (patient reported): Fasting- 106-120 mg/dL  Current diabetes symptoms: none  Diet/Exercise: Reports he has been slacking off the last couple weeks because he hasnt been feeling to well. Patient prefers to do diet and exercise.   Eye exam: due  Foot exam: up to date  Urine Albumin:   Lab Results   Component Value Date    UMALCR 11.25 03/14/2023      Lab Results   Component Value  "Date    A1C 6.1 (H) 05/17/2023   ]    Depression/Anxiety:   Bupropion  mg daily  Quetiapine 150 mg daily - reports he doesn't take this as often since it makes him drowsy  Clonidine 0.1 mg at bedtime as needed    Reports these work well for him        1/9/2023    12:16 PM 4/5/2023     8:24 AM 6/14/2023     9:30 AM   PHQ   PHQ-9 Total Score 13 0 5   Q9: Thoughts of better off dead/self-harm past 2 weeks Not at all Not at all Not at all       Hypertension:   Lisinopril 10 mg daily    Patient self-monitors blood pressure.  Home BP monitoring in range of 135/83 mm Hg.  Patient reports no current medication side effects.  BP Readings from Last 3 Encounters:   05/17/23 115/77   03/14/23 (!) 146/100   02/13/23 (!) 160/106     Pulse Readings from Last 3 Encounters:   05/17/23 64   03/14/23 92   02/13/23 78     Pain:  Subutex 8 mg twice a day  - reports pain is doing well - treating \"all type of pain\"  Ibuprofen 800 mg per day - taking for a tooth ache - plans to go to urgent care for this. Dentist told him they should pull It but he didn't want them to.     ED:   Sildenafil 20 mg as needed - reports this is very expensive with him and he cannot afford it.     Supplements:  Ferrous sulfate 325 mg daily    Hemoglobin   Date Value Ref Range Status   05/17/2023 11.0 (L) 13.3 - 17.7 g/dL Final   08/17/2018 11.7 (L) 13.3 - 17.7 g/dL Final   ]      Today's Vitals: There were no vitals taken for this visit.  ----------------      I spent 45 minutes with this patient today. All changes were made via collaborative practice agreement with John Clarke MD. A copy of the visit note was provided to the patient's provider(s).    A summary of these recommendations was sent via [employer].    Magnus Johnson, Pharm. D., [facility]  Medication Therapy Management Pharmacist    Telemedicine Visit Details  Type of service:  Telephone visit  Start Time: 11 am  End Time: 11:45 am     Medication Therapy Recommendations  Anemia, unspecified type    " Current Medication: ferrous sulfate (FEROSUL) 325 (65 Fe) MG tablet (Discontinued)   Rationale: Frequency inappropriate - Dosage too low - Effectiveness   Recommendation: Decrease Frequency   Status: Accepted per CPA         Diabetes mellitus, type 2 (H)    Current Medication: atorvastatin (LIPITOR) 20 MG tablet   Rationale: Patient forgets to take - Adherence - Adherence   Recommendation: Provide Education   Status: Patient Agreed - Adherence/Education         Drug-induced erectile dysfunction    Current Medication: sildenafil (REVATIO) 20 MG tablet (Discontinued)   Rationale: Cannot afford medication product - Cost - Adherence   Recommendation: Referral to Service    Status: Accepted per CPA

## 2023-06-23 RX ORDER — LISINOPRIL 10 MG/1
10 TABLET ORAL DAILY
Qty: 30 TABLET | Refills: 3 | Status: SHIPPED | OUTPATIENT
Start: 2023-06-23 | End: 2023-10-02

## 2023-06-23 RX ORDER — SILDENAFIL CITRATE 20 MG/1
40-60 TABLET ORAL DAILY PRN
Qty: 30 TABLET | Refills: 3 | Status: SHIPPED | OUTPATIENT
Start: 2023-06-23 | End: 2023-08-16

## 2023-06-23 RX ORDER — FERROUS SULFATE 325(65) MG
325 TABLET ORAL EVERY OTHER DAY
Qty: 90 TABLET | Refills: 3 | Status: SHIPPED | OUTPATIENT
Start: 2023-06-23

## 2023-06-23 NOTE — PATIENT INSTRUCTIONS
"Recommendations from today's MTM visit:                                                    MTM (medication therapy management) is a service provided by a clinical pharmacist designed to help you get the most of out of your medicines.   Today we reviewed what your medicines are for, how to know if they are working, that your medicines are safe and how to make your medicine regimen as easy as possible.      1. Sent sildenafil  Prescription to [facility]. Please go online and make an account.   2.  and start atorvastatin.   3. Take iron every other day or add vitamin C to help with absorption.     Follow-up: Return if symptoms worsen or fail to improve.    It was great speaking with you today.  I value your experience and would be very thankful for your time in providing feedback in our clinic survey. In the next few days, you may receive an email or text message from [employer] with a link to a survey related to your  clinical pharmacist.\"     To schedule another MTM appointment, please call the clinic directly or you may call the MTM scheduling line at [phone] or toll-free at [phone].     My Clinical Pharmacist's contact information:                                                      Please feel free to contact me with any questions or concerns you have.      Magnus Johnson, Pharm. D., [facility]  Medication Therapy Management Pharmacist    "

## 2023-07-13 ENCOUNTER — TELEPHONE (OUTPATIENT)
Dept: PSYCHIATRY | Facility: CLINIC | Age: 48
End: 2023-07-13
Payer: COMMERCIAL

## 2023-07-13 NOTE — TELEPHONE ENCOUNTER
Per chart review, buprenorphine (SUBUTEX) 8 MG SUBL sublingual tablet with R-1 was sent on 6/14/23. Will update patient via [employer].

## 2023-07-13 NOTE — TELEPHONE ENCOUNTER
[facility] Call [facility]    Phone Message    May a detailed message be left on voicemail: yes     Reason for Call: Medication Refill Request    Has the patient contacted the pharmacy for the refill? Yes   Name of medication being requested: Puprenorphine  Provider who prescribed the medication: Dr. Jung  Pharmacy:  [address]  Date medication is needed: ASAP, completely out.       Action Taken: Other: Nurses.     Travel Screening: Not Applicable

## 2023-07-13 NOTE — TELEPHONE ENCOUNTER
[facility] Call [facility]    Phone Message    May a detailed message be left on voicemail: yes     Reason for Call: Other: clayton called and was wondering if his meds are ready  for  because he has not heard anything and was wondering if a nurse could give him a call     Action Taken: Other: nurse pool    Travel Screening: Not Applicable

## 2023-07-13 NOTE — TELEPHONE ENCOUNTER
Writer reviewed patient's chart. Per an earlier telephone encounter, he requested a refill of Subutex earlier today. The [place]  reflects that he is due for a refill today/tomorrow, as his last fill was on 6/14 for a 30 d/s. It appears he should have one more refill available at the [facility].    Called the pharmacy and confirmed they have one refill on file. Pharmacist was able to process this and agreed to fill it today for the patient, but the pharmacy closes at 6 pm.    Called Ajith and provided the above update. He confirmed he has one more tab left and is okay with waiting until tomorrow morning to  the Rx.    No further action needed.    Sonya Whitaker RN on 7/13/2023 at 5:20 PM

## 2023-07-19 ENCOUNTER — VIRTUAL VISIT (OUTPATIENT)
Dept: PSYCHIATRY | Facility: CLINIC | Age: 48
End: 2023-07-19
Attending: PSYCHIATRY & NEUROLOGY
Payer: COMMERCIAL

## 2023-07-19 DIAGNOSIS — F11.20 OPIOID DEPENDENCE ON AGONIST THERAPY (H): Primary | ICD-10-CM

## 2023-07-19 DIAGNOSIS — F11.90 OPIOID USE DISORDER: ICD-10-CM

## 2023-07-19 DIAGNOSIS — F41.8 DEPRESSION WITH ANXIETY: ICD-10-CM

## 2023-07-19 PROCEDURE — 99214 OFFICE O/P EST MOD 30 MIN: CPT | Mod: VID | Performed by: STUDENT IN AN ORGANIZED HEALTH CARE EDUCATION/TRAINING PROGRAM

## 2023-07-19 RX ORDER — CLONIDINE HYDROCHLORIDE 0.1 MG/1
0.1 TABLET ORAL AT BEDTIME
Qty: 30 TABLET | Refills: 1 | Status: SHIPPED | OUTPATIENT
Start: 2023-07-19 | End: 2024-06-12

## 2023-07-19 RX ORDER — QUETIAPINE FUMARATE 150 MG/1
150 TABLET, FILM COATED ORAL DAILY
Qty: 30 TABLET | Refills: 1 | Status: SHIPPED | OUTPATIENT
Start: 2023-07-19 | End: 2023-08-23

## 2023-07-19 RX ORDER — BUPRENORPHINE 8 MG/1
8 TABLET SUBLINGUAL 2 TIMES DAILY
Qty: 60 TABLET | Refills: 0 | Status: SHIPPED | OUTPATIENT
Start: 2023-07-19 | End: 2023-08-23

## 2023-07-19 RX ORDER — BUPROPION HYDROCHLORIDE 300 MG/1
300 TABLET ORAL EVERY MORNING
Qty: 30 TABLET | Refills: 1 | Status: SHIPPED | OUTPATIENT
Start: 2023-07-19 | End: 2023-08-23

## 2023-07-19 ASSESSMENT — PAIN SCALES - GENERAL: PAINLEVEL: NO PAIN (0)

## 2023-07-19 NOTE — PATIENT INSTRUCTIONS
**For crisis resources, please see the information at the end of this document**   Patient Education    Thank you for coming to the [facility] [place] [facility].     Lab Testing:  If you had lab testing today and your results are reassuring or normal they will be mailed to you or sent through [employer] within 7 days. If the lab tests need quick action we will call you with the results. The phone number we will call with results is # [phone]. If this is not the best number please call our clinic and change the number.     Medication Refills:  If you need any refills please call your pharmacy and they will contact us. Our fax number for refills is [phone].   Three business days of notice are needed for general medication refill requests.   Five business days of notice are needed for controlled substance refill requests.   If you need to change to a different pharmacy, please contact the new pharmacy directly. The new pharmacy will help you get your medications transferred.     Contact Us:  Please call [phone] during business hours (8-5:00 M-F).   If you have medication related questions after clinic hours, or on the weekend, please call [phone].     Financial Assistance [phone]   Medical Records [phone]       MENTAL HEALTH CRISIS RESOURCES:  For a emergency help, please call 911 or go to the nearest Emergency Department.     Emergency Walk-In Options:   EmPATH Unit @ [facility] [place] [facility] ([place]): [phone] - Specialized mental health emergency area designed to be calming  [facility] ([place]): [phone]  [facility] ([place]): [phone]  [facility]): [phone]    [facility] Crisis Information:   [place]: [phone]  [place]: [phone]  Vahid (LEWIS) - Adult: [phone]     Child: [phone]  Thad - Adult: [phone]     Child: [phone]  [place]:  [phone]  List of all [facility] resources:   https://[place].gov/dhs/people-we-serve/adults/health-care/mental-health/resources/crisis-contacts.jsp    National Crisis Information:   Crisis Text Line: Text  [place]  to 956026  Suicide & Crisis Lifeline: 988  National Suicide Prevention Lifeline: 8-534-856-TALK (1-390.195.3617)       For online chat options, visit https://suicidepreventionlifeline.org/chat/  Poison Control Center: 8-386-983-6866  Trans Lifeline: 7-080-677-4084 - Hotline for transgender people of all ages  The [employer] Project: 1-617-124-3320 - Hotline for LGBT youth     For Non-Emergency Support:   Fast Tracker: Mental Health & Substance Use Disorder Resources -   https://www.[employer]avan.org/

## 2023-07-19 NOTE — PROGRESS NOTES
Virtual Visit Details    Type of service:  Video Visit   Video Start Time: 8:39  Video End Time:9:14    Originating Location (pt. Location): Home    Distant Location (provider location):  On-site  Platform used for Video Visit: [employer]

## 2023-07-19 NOTE — PROGRESS NOTES
----------------------------------------------------------------------------------------------------------  [facility]   Addiction Medicine Progress Note                     Virtual Visit Details    Type of service:  Video Visit   Video Start Time: 8:39 AM  Video End Time:9:14    Originating Location (pt. Location): Home  Distant Location (provider location):  On-site  Platform used for Video Visit: [place] [employer]    Background   Ajith Delvalle is a 48 year old male with a past medical history of multiple head injuries, OUD, depression, anxiety, Type II Diabetes who is here for follow-up of cannabis and opioid use.      SYNOPSIS:     Patient was at [facility] from 01/02-01/29/2023. He reports he has been in [facility] since 2022. He established care with this clinic on 03/08/2023. Primary substances are opioids and marijuana. Last use of marijuana was in august, 2022. Last use of opioids was in December 2022. Patient currently taking Subutex due to significant side effects with Suboxone.      Subjective/INTERIM HISTORY                                             Since last visit:    Things have been going well overall since last visit  Denies use of any opioids or other substances    Doing well with Subutex 8mg BID   - No notable side effects   - No cravings, no symptoms of withdrawal   - Just picked up refill without issue   - Completing weekly urine tests with drug court    Has been having trouble getting the sildenafil   - Had [facility] visit, prescription was sent to online pharmacy   - Has not been able to figure out [facility]   - Has not yet made an account    Trying to find a different job   - Having difficulty due to legal background   - Making him feel a bit depressed   - Not having a job is impacting his sense of self-worth, feels bad that he can't meet the financial goals he would like to   - Not currently connected with any support    Feels current dose of  "Seroquel is working well, does not want to adjust    Treatment changes and response: Subutex increased to 8mg BID May 2023, cravings improved    RECENT SYMPTOMS   [PSYCH ROS]   CRAVINGS/URGES: None  SLEEP: \"pretty good\"  SIDE EFFECTS: ED - still has not been able to get PDE5i  ANXIETY:  None  PANIC ATTACK:  none   DEPRESSION:  depressed mood;  DENIES- suicidal ideation, low energy, insomnia, hypersomnia, appetite changes, poor concentration /memory and excessive guilt  PSYCHOSIS:  none;  DENIES- auditory hallucinations and visual hallucinations  JOSEPH/HYPOMANIA:  none;  DENIES-   OTHER:      MENTAL STATUS EXAM/WITHDRAWAL                                                              Withdrawal symptoms: None    Alertness: alert  and oriented  Orientation: awake and alert  Appearance: adequately groomed  Behavior/Demeanor: cooperative, pleasant and calm, with good  eye contact.  Speech: normal  Psychomotor: normal or unremarkable    Mood:  \"okay\"  Affect: full range and was congruent to speech content.  Thought Process/Associations: unremarkable   Thought Content: devoid of  suicidal ideation and delusions.   Perception: devoid of  auditory hallucinations and visual hallucinations  Insight: adequate.  Judgment: good.    These cognitive functions grossly appear as described, but were not formally tested.    ASSESSMENT/Diagnosis/PLAN                                                  Assessment:  Ajith Delvalle is a 48 year old male with a past medical history of multiple head injuries, OUD, depression, anxiety, Type II Diabetes, who is here for follow-up of opioid use.       # Opioid use disorder-severe, in early remission: Last use of opioids was in December 2022. Previously patient received Suboxone care through [facility], however was discharged in 9/2020 after submitting an adulterated urine sample. He is currently engaged in [place] drug court. He was on Suboxone in the past but notes that this caused " significant nausea and vomiting. He is now doing well with Subutex. Prior authorization effective until March, 2024.   - Continue peer support meetings   - Continue Subutex 8 mg BID   - Patient was requested to complete a UDS and buprenorphine level prior to this visit, he was not aware of this request - patient was under the impression that his urine screens through drug court were adequate.  - Requested patient complete a urine for our clinic on the day of his next court-required random screen, he is agreeable to that. Placed future order to be completed at [facility] [place].     # Depression unspecified   # Anxiety unspecified   Patient reports several years of depression and anxiety. He thinks that he had ongoing symptoms when he was sober for 3 years but is uncertain. He does have some depressed mood due to difficulty finding new employments but no additional symptoms concerning for depressive episode.  - Continue seroquel 150 mg daily and Wellbutrin XL 300mg daily  - Ideally would continue to taper Seroquel to reduce metabolic risk in setting of diabetes, will revisit at each appointment. Could consider trial of trazodone or mirtazapine at next visit.  - Clonidine 0.1 mg bedtime helps with sleep and blood pressure, continue  - Sent Smart [employer] message with employment resources    # Erectile dysfunction, psychogenic vs medication-induced   - Still having issues accessing sildenafil due to cost   - Sent Smart Patients message with additional instructions to assist in signing up for [email or web address]     RTC: 1 month    [place] PRESCRIPTION MONITORING PROGRAM [] was checked today:  indicates appropriate fill history.    ADDICTION FELLOW: Gonzalez García MD    Patient seen by and discussed with staff Dr. Saavedra. Supervisor is Dr. Saavedra    SUBSTANCE USE DETAILED HISTORY                                                                 Substance First became regular or problematic Most recent use   Alcohol       Cannabis  Age 8     Stimulants  " Tried Cocaine when he was about 12 years, \"did not like it\"     Opioids Heroin first use 30 years. Used 1g per day for 17 years  Snorts     Sedatives  used to mix xanax with heroin  last use     Hallucinogens       Inhalants       Other       OTC drugs       Nicotine          Longest period of sobriety; protective factors?  3 years, had a CPS case at the time    Withdrawal history Denies    Previous [facility] treatment programs  [facility], [facility]. [facility]. Most recently [facility]. 4 inpatient and 4 outpatient   Medical Complications, Overdose Hx Denies    IV Drug Use Hx None   Previous Medication for Addiction Tx Suboxone   Current Recovery Activities [place] : IOP       Consequences of Use:  Legal: Several legal issues, currently in drug court   Social/Family: yes   Occupational/Financial: yes-not working, income lost over the years because of substance use  Health: Recently diagnosed with diabetes. Wonders if his heroin use could be related to diabetes     PAST PSYCH MED TRIALS       Drug  Treatment Dates Max Dose (mg) Helpful Adverse Effects    Reason Discontinued   Amitriptyline              Paxil                                                           , while in [facility] residential. He was on Amitriptyline and Paxil. Amitriptyline helped him sleep. He stopped when he got out of jail.       SOCIAL HISTORY                                                                           Financial situation: Has been in treatment and drug court and longterm so not financially stable   Education/Employment: Working currently, but looking for new employment   Living Situation: Has a home   Support System: Family   Family: Parents are still together. Middle of 3 kids, has a brother and sister.  and has 6 kids, one . Moved from [place] to [place] in     PERTINENT FAMILY HISTORY                                                                           Mental Health History-  none ,  Substance Use History " - none     Pertinent MEDICAL  HISTORY                                   Diabetes  Hypertension  HbC trait    Current Medications                                                                         Current Outpatient Medications   Medication Sig     alcohol swab prep pads Use to swab area of injection/pj as directed.     atorvastatin (LIPITOR) 20 MG tablet Take 1 tablet (20 mg) by mouth daily     blood glucose (NO BRAND SPECIFIED) test strip Use to test blood sugar 1 times daily or as directed. To accompany: Blood Glucose Monitor Brands: per insurance.     blood glucose calibration (NO BRAND SPECIFIED) solution To accompany: Blood Glucose Monitor Brands: per insurance.     blood glucose monitoring (NO BRAND SPECIFIED) meter device kit Use to test blood sugar 1 times daily or as directed. Preferred blood glucose meter OR supplies to accompany: Blood Glucose Monitor Brands: per insurance.     buprenorphine (SUBUTEX) 8 MG SUBL sublingual tablet Place 1 tablet (8 mg) under the tongue 2 times daily     buPROPion (WELLBUTRIN XL) 300 MG 24 hr tablet Take 1 tablet (300 mg) by mouth every morning     cloNIDine (CATAPRES) 0.1 MG tablet Take 1 tablet (0.1 mg) by mouth At Bedtime     ferrous sulfate (FEROSUL) 325 (65 Fe) MG tablet Take 1 tablet (325 mg) by mouth every other day     ibuprofen (ADVIL/MOTRIN) 200 MG tablet Take 200 mg by mouth every 4 hours as needed for pain     lisinopril (ZESTRIL) 10 MG tablet Take 1 tablet (10 mg) by mouth daily     naloxone (NARCAN) 4 MG/0.1ML nasal spray Spray 1 spray (4 mg) into one nostril alternating nostrils as needed for opioid reversal every 2-3 minutes until assistance arrives     nicotine (NICORETTE) 2 MG gum Place 1 each (2 mg) inside cheek every hour as needed for smoking cessation (Patient not taking: Reported on 6/14/2023)     QUEtiapine Fumarate 150 MG TABS Take 150 mg by mouth daily     sildenafil (REVATIO) 20 MG tablet Take 2-3 tablets (40-60 mg) by mouth daily as needed  (1-4 hours before sexual activity)     thin (NO BRAND SPECIFIED) lancets Use with lanceting device. To accompany: Blood Glucose Monitor Brands: per insurance.     No current facility-administered medications for this visit.     Facility-Administered Medications Ordered in Other Visits   Medication     Self Administer Medications: Behavioral Services

## 2023-07-19 NOTE — NURSING NOTE
Is the patient currently in the state of [place]? YES    Visit mode:VIDEO    If the visit is dropped, the patient can be reconnected by: VIDEO VISIT: Text to cell phone: [phone]    Will anyone else be joining the visit? NO      How would you like to obtain your AVS? MyChart    Are changes needed to the allergy or medication list? NO    Patient denies any changes since last visit regarding medication and allergies.    Will do QNRS on own through mychart    Reason for visit: RECHECK      Bri Horner VF

## 2023-08-16 ENCOUNTER — LAB (OUTPATIENT)
Dept: LAB | Facility: CLINIC | Age: 48
End: 2023-08-16
Payer: COMMERCIAL

## 2023-08-16 ENCOUNTER — LAB (OUTPATIENT)
Dept: INTERNAL MEDICINE | Facility: CLINIC | Age: 48
End: 2023-08-16

## 2023-08-16 ENCOUNTER — OFFICE VISIT (OUTPATIENT)
Dept: INTERNAL MEDICINE | Facility: CLINIC | Age: 48
End: 2023-08-16
Payer: COMMERCIAL

## 2023-08-16 VITALS
BODY MASS INDEX: 37.19 KG/M2 | HEIGHT: 66 IN | WEIGHT: 231.4 LBS | HEART RATE: 66 BPM | OXYGEN SATURATION: 94 % | DIASTOLIC BLOOD PRESSURE: 80 MMHG | SYSTOLIC BLOOD PRESSURE: 129 MMHG

## 2023-08-16 DIAGNOSIS — Z12.11 SPECIAL SCREENING FOR MALIGNANT NEOPLASMS, COLON: ICD-10-CM

## 2023-08-16 DIAGNOSIS — D64.9 ANEMIA, UNSPECIFIED TYPE: Primary | ICD-10-CM

## 2023-08-16 DIAGNOSIS — D64.9 ANEMIA, UNSPECIFIED TYPE: ICD-10-CM

## 2023-08-16 DIAGNOSIS — N52.2 DRUG-INDUCED ERECTILE DYSFUNCTION: ICD-10-CM

## 2023-08-16 DIAGNOSIS — F11.20 OPIOID DEPENDENCE ON AGONIST THERAPY (H): ICD-10-CM

## 2023-08-16 LAB
AMPHETAMINES UR QL SCN: NORMAL
BARBITURATES UR QL SCN: NORMAL
BENZODIAZ UR QL SCN: NORMAL
BZE UR QL SCN: NORMAL
CANNABINOIDS UR QL SCN: NORMAL
ERYTHROCYTE [DISTWIDTH] IN BLOOD BY AUTOMATED COUNT: 13.6 % (ref 10–15)
ETHANOL UR QL SCN: NORMAL
FERRITIN SERPL-MCNC: 83 NG/ML (ref 31–409)
HCT VFR BLD AUTO: 33.1 % (ref 40–53)
HGB BLD-MCNC: 11.9 G/DL (ref 13.3–17.7)
IRON BINDING CAPACITY (ROCHE): 283 UG/DL (ref 240–430)
IRON SATN MFR SERPL: 14 % (ref 15–46)
IRON SERPL-MCNC: 41 UG/DL (ref 61–157)
MCH RBC QN AUTO: 27.9 PG (ref 26.5–33)
MCHC RBC AUTO-ENTMCNC: 36 G/DL (ref 31.5–36.5)
MCV RBC AUTO: 78 FL (ref 78–100)
OPIATES UR QL SCN: NORMAL
PCP QUAL URINE (ROCHE): NORMAL
PLATELET # BLD AUTO: 263 10E3/UL (ref 150–450)
RBC # BLD AUTO: 4.26 10E6/UL (ref 4.4–5.9)
WBC # BLD AUTO: 6.1 10E3/UL (ref 4–11)

## 2023-08-16 PROCEDURE — 80307 DRUG TEST PRSMV CHEM ANLYZR: CPT | Performed by: STUDENT IN AN ORGANIZED HEALTH CARE EDUCATION/TRAINING PROGRAM

## 2023-08-16 PROCEDURE — 85027 COMPLETE CBC AUTOMATED: CPT | Performed by: PATHOLOGY

## 2023-08-16 PROCEDURE — 83540 ASSAY OF IRON: CPT | Performed by: PATHOLOGY

## 2023-08-16 PROCEDURE — 80299 QUANTITATIVE ASSAY DRUG: CPT | Mod: 90 | Performed by: PATHOLOGY

## 2023-08-16 PROCEDURE — 99214 OFFICE O/P EST MOD 30 MIN: CPT | Performed by: INTERNAL MEDICINE

## 2023-08-16 PROCEDURE — 83550 IRON BINDING TEST: CPT | Performed by: PATHOLOGY

## 2023-08-16 PROCEDURE — 36415 COLL VENOUS BLD VENIPUNCTURE: CPT | Performed by: PATHOLOGY

## 2023-08-16 PROCEDURE — 82728 ASSAY OF FERRITIN: CPT | Performed by: PATHOLOGY

## 2023-08-16 PROCEDURE — 99000 SPECIMEN HANDLING OFFICE-LAB: CPT | Performed by: PATHOLOGY

## 2023-08-16 RX ORDER — SILDENAFIL 100 MG/1
100 TABLET, FILM COATED ORAL DAILY PRN
Qty: 30 TABLET | Refills: 4 | Status: SHIPPED | OUTPATIENT
Start: 2023-08-16 | End: 2023-08-16

## 2023-08-16 RX ORDER — SILDENAFIL 100 MG/1
100 TABLET, FILM COATED ORAL DAILY PRN
Qty: 30 TABLET | Refills: 4 | Status: SHIPPED | OUTPATIENT
Start: 2023-08-16

## 2023-08-16 NOTE — LETTER
Patient:  Ajith Delvalle  :   1975  MRN:     [id number]        Mr. Ajith Delvalle  [address]    [facility] [address]        2023    Dear Mr. Delvalle,    Thank you for choosing the [facility] [place] for your healthcare needs.  We appreciate the opportunity to serve you.    The following are your recent test results.     CBC with platelets     Status: Abnormal   Result Value Ref Range    WBC Count 6.1 4.0 - 11.0 10e3/uL    RBC Count 4.26 (L) 4.40 - 5.90 10e6/uL    Hemoglobin 11.9 (L) 13.3 - 17.7 g/dL    Hematocrit 33.1 (L) 40.0 - 53.0 %    MCV 78 78 - 100 fL    MCH 27.9 26.5 - 33.0 pg    MCHC 36.0 31.5 - 36.5 g/dL    RDW 13.6 10.0 - 15.0 %    Platelet Count 263 150 - 450 10e3/uL   Iron and iron binding capacity     Status: Abnormal   Result Value Ref Range    Iron 41 (L) 61 - 157 ug/dL    Iron Binding Capacity 283 240 - 430 ug/dL    Iron Sat Index 14 (L) 15 - 46 %   Ferritin     Status: Normal   Result Value Ref Range    Ferritin 83 31 - 409 ng/mL       Your iron is still low but your iron binding capacity is normal- sometimes this can be seen due to chronic inflammation.  Hemoglobin is still slightly low.  I did place a referral to the hematology blood doctors so they can help us further evaluate this.  They will call you to schedule.     Please contact your provider if you have any questions or concerns.  We look forward to serving your needs in the future.      Sincerely,    Dr. Clarke's Office  Primary Care Clinic

## 2023-08-16 NOTE — NURSING NOTE
Ajith Delvalle is a 48 year old male patient that presents today in clinic for the following:    Chief Complaint   Patient presents with    Consult     Iron level concerns, increased dose of sildenafil     The patient's allergies and medications were reviewed as noted. A set of vitals were recorded as noted without incident. The patient does not have any other questions for the provider.    Giancarlo Ramos, EMT at 4:39 PM on 8/16/2023

## 2023-08-16 NOTE — PROGRESS NOTES
Assessment & Plan     Drug-induced erectile dysfunction    Ajith reports that the 60mg sildenafil is not working.  We'll try increasing to 100mg.  Prescription sent electronically to [facility].     - sildenafil (VIAGRA) 100 MG tablet; Take 1 tablet (100 mg) by mouth daily as needed (1-4 hours before sexual activity)    Anemia, unspecified type    Ajith reports feeling ongoing fatigue for the past month and ongoing low Hgb when he tries to donate plasma.  He is still taking the iron every other day.  We'll recheck labs as below and recommended colon cancer screening- Cologuard was previously ordered but not submitted, he is not sure what happened to the kit.  Will reorder.  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  If iron is normal but Hgb still low, will have him follow-up with hematology.     - CBC with platelets; Future  - Iron and iron binding capacity; Future  - Ferritin; Future    Special screening for malignant neoplasms, colon    - COLOGUARD(EXACT SCIENCES); Future      John Clarke MD  [facility]   Ajith is a 48 year old, presenting for the following health issues:  Consult (Iron level concerns, increased dose of sildenafil)    HPI     I saw Ajith earlier this year for anemia.  He had low iron, so we started a supplement with improvement in iron but ongoing anemia, so additional testing was done that showed hemoglobin C trait with minor delta hemoglobin A2 variant.     Ajith reports feeling pretty tired today due to a busy day, but has also been feeling tired for the past month.  He is taking the iron pills every other day but when he donates plasma his hematocrit is still low.      He is taking 60mg of sildenafil but he is wondering about changing to brand Viagra or Cialis.  He is getting medication online.        Review of Systems   Constitutional, heme,  systems are negative, except as  "otherwise noted.      Objective    /80 (BP Location: Right arm, Patient Position: Sitting, Cuff Size: Adult Large)   Pulse 66   Ht 1.674 m (5' 5.91\")   Wt 105 kg (231 lb 6.4 oz)   SpO2 94%   BMI 37.46 kg/m    Body mass index is 37.46 kg/m .  Physical Exam   GENERAL: healthy, alert and no distress  PSYCH: mentation appears normal, fatigued, and slowed speech                 "

## 2023-08-17 ENCOUNTER — PATIENT OUTREACH (OUTPATIENT)
Dept: ONCOLOGY | Facility: CLINIC | Age: 48
End: 2023-08-17
Payer: COMMERCIAL

## 2023-08-17 NOTE — PROGRESS NOTES
Hematology referral reviewed for [facility], see below.    Referral reason: anemia with iron deficiency    Current abnormal labs: Available in Chart Review    Outreach: Jaycee sent to patient    Plan: Triage instructions updated and sent to NPS for completion.

## 2023-08-18 ENCOUNTER — MYC MEDICAL ADVICE (OUTPATIENT)
Dept: INTERNAL MEDICINE | Facility: CLINIC | Age: 48
End: 2023-08-18
Payer: COMMERCIAL

## 2023-08-18 ENCOUNTER — TELEPHONE (OUTPATIENT)
Dept: INTERNAL MEDICINE | Facility: CLINIC | Age: 48
End: 2023-08-18
Payer: COMMERCIAL

## 2023-08-18 NOTE — TELEPHONE ENCOUNTER
[facility]    Phone Message    May a detailed message be left on voicemail: yes     Reason for Call: Patient xcalled and wanted to increase dosage of buPROPion (WELLBUTRIN XL) 300 MG 24 hr tablet and stop taking his Quetiapine, please call, thank you!       Action Taken: Message routed to:  [facility]    Travel Screening: Not Applicable

## 2023-08-19 LAB
BUPRENORPHINE UR-MCNC: 48 NG/ML
BUPRENORPHINE UR-MCNC: >1000 NG/ML
NALOXONE UR CFM-MCNC: <100 NG/ML
NORBUPRENORPHINE UR CFM-MCNC: >1000 NG/ML
NORBUPRENORPHINE UR-MCNC: >1000 NG/ML

## 2023-08-21 NOTE — TELEPHONE ENCOUNTER
Reviewed chart, it appears we have a follow-up visit scheduled in 2 days. Please call patient to let him know we will discuss these dose changes at that visit. It is okay if he stops taking his Seroquel before that appointment.    Gonzalez García MD

## 2023-08-23 ENCOUNTER — TELEPHONE (OUTPATIENT)
Dept: PSYCHIATRY | Facility: CLINIC | Age: 48
End: 2023-08-23
Payer: COMMERCIAL

## 2023-08-23 ENCOUNTER — VIRTUAL VISIT (OUTPATIENT)
Dept: PSYCHIATRY | Facility: CLINIC | Age: 48
End: 2023-08-23
Attending: PSYCHIATRY & NEUROLOGY
Payer: COMMERCIAL

## 2023-08-23 DIAGNOSIS — F41.8 DEPRESSION WITH ANXIETY: ICD-10-CM

## 2023-08-23 DIAGNOSIS — F11.90 OPIOID USE DISORDER: ICD-10-CM

## 2023-08-23 PROCEDURE — 90833 PSYTX W PT W E/M 30 MIN: CPT | Mod: VID | Performed by: STUDENT IN AN ORGANIZED HEALTH CARE EDUCATION/TRAINING PROGRAM

## 2023-08-23 PROCEDURE — 99214 OFFICE O/P EST MOD 30 MIN: CPT | Mod: VID | Performed by: STUDENT IN AN ORGANIZED HEALTH CARE EDUCATION/TRAINING PROGRAM

## 2023-08-23 RX ORDER — BUPRENORPHINE 8 MG/1
8 TABLET SUBLINGUAL 2 TIMES DAILY
Qty: 60 TABLET | Refills: 0 | Status: SHIPPED | OUTPATIENT
Start: 2023-08-23 | End: 2023-10-05

## 2023-08-23 RX ORDER — BUPROPION HYDROCHLORIDE 450 MG/1
300 TABLET, FILM COATED, EXTENDED RELEASE ORAL EVERY MORNING
Qty: 30 TABLET | Refills: 1 | Status: SHIPPED | OUTPATIENT
Start: 2023-08-23 | End: 2023-08-24

## 2023-08-23 ASSESSMENT — PAIN SCALES - GENERAL: PAINLEVEL: NO PAIN (0)

## 2023-08-23 NOTE — TELEPHONE ENCOUNTER
Health Call Center    Phone Message    May a detailed message be left on voicemail: yes     Reason for Call: Medication Question or concern regarding medication   Prescription Clarification  Name of Medication: buPROPion 450 MG   Prescribing Provider: Dr. García   Pharmacy: [facility]   What on the order needs clarification? The pharmacy was calling to get clarification on the instructions of this prescription, compared to its dosage. The instructions state to take 300mg by mouth every morning, however the prescription is written for 450mg. The pharmacy wanted to know whether the prescription dosage is correct relative to its instructions, or if the dosage should be adjusted to 300mg in order to fit the instructions.      Action Taken: Message routed to:  Other: [facility] psychiatry nurse pool    Travel Screening: Not Applicable
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
## 2023-08-23 NOTE — NURSING NOTE
Is the patient currently in the state of [place]? YES    Visit mode:VIDEO    If the visit is dropped, the patient can be reconnected by: VIDEO VISIT: Text to cell phone:   Telephone Information:   Mobile [phone]       Will anyone else be joining the visit? NO  (If patient encounters technical issues they should call [phone]    How would you like to obtain your AVS? MyChart    Are changes needed to the allergy or medication list? No    QNRS not done due to time of check in    Reason for visit: RECHECK    Bri DAVISF

## 2023-08-23 NOTE — TELEPHONE ENCOUNTER
[facility] Call Center    Phone Message    May a detailed message be left on voicemail: yes     Reason for Call: Medication Question or concern regarding medication   Prescription Clarification  Name of Medication: Bupropion 450mg  Prescribing Provider: Dr. García   Pharmacy: [address] [phone]    What on the order needs clarification? Prescription is for 450mg of buproprion but sig says 300mg. Pharmacy looking for clarification.      Action Taken: Message routed to:  Other: P PSYCHIATRY NURSE-P    Travel Screening: Not Applicable

## 2023-08-23 NOTE — TELEPHONE ENCOUNTER
Per chart, a rx for bupropion 450mg tablet was sent to pharmacy with instructions to take 300mg daily.     Pt seen today in clinic and note is open. Message routed to provider to clarify current dose.    Tamela White RN on 8/23/2023 at 12:30 PM

## 2023-08-23 NOTE — PATIENT INSTRUCTIONS
**For crisis resources, please see the information at the end of this document**   Patient Education    Thank you for coming to the [facility] [place] [facility].     Lab Testing:  If you had lab testing today and your results are reassuring or normal they will be mailed to you or sent through [facility] within 7 days. If the lab tests need quick action we will call you with the results. The phone number we will call with results is # [phone]. If this is not the best number please call our clinic and change the number.     Medication Refills:  If you need any refills please call your pharmacy and they will contact us. Our fax number for refills is [phone].   Three business days of notice are needed for general medication refill requests.   Five business days of notice are needed for controlled substance refill requests.   If you need to change to a different pharmacy, please contact the new pharmacy directly. The new pharmacy will help you get your medications transferred.     Contact Us:  Please call [phone] during business hours (8-5:00 M-F).   If you have medication related questions after clinic hours, or on the weekend, please call [phone].     Financial Assistance [phone]   Medical Records [phone]       MENTAL HEALTH CRISIS RESOURCES:  For a emergency help, please call 911 or go to the nearest Emergency Department.     Emergency Walk-In Options:   EmPATH Unit @ [facility] ([place]): [phone] - Specialized mental health emergency area designed to be calming  [facility] ([place]): [phone]  [facility] ([place]): [phone]  [facility]): [phone]    [facility] Crisis Information:   [place]: [phone]  [place]: [phone]  [place] ([place]) - Adult: [phone]     Child: [phone]  [place] - Adult: [phone]     Child: [phone]  [place]:  [phone]  List of all [facility] resources:   https://[place].gov/dhs/people-we-serve/adults/health-care/mental-health/resources/crisis-contacts.jsp    National Crisis Information:   Crisis Text Line: Text  [place]  to 318494  Suicide & Crisis Lifeline: 988  National Suicide Prevention Lifeline: 4-377-050-TALK (1-565.902.1692)       For online chat options, visit https://suicidepreventionlifeline.org/chat/  Poison Control Center: 1-474-273-8455  Trans Lifeline: 7-861-037-5993 - Hotline for transgender people of all ages  The [employer] Project: 7-846-095-9098 - Hotline for LGBT youth     For Non-Emergency Support:   Fast Tracker: Mental Health & Substance Use Disorder Resources -   https://www.[employer]ckPressConnectn.org/

## 2023-08-23 NOTE — PROGRESS NOTES
Virtual Visit Details    Type of service:  Video Visit   Video Start Time:  10:34  Video End Time: 10:54    Originating Location (pt. Location): Other : school  Distant Location (provider location):  On-site  Platform used for Video Visit: Noble

## 2023-08-23 NOTE — TELEPHONE ENCOUNTER
Duplicate - see previous 8/23/23 encounter for follow-up.    Tamela White RN on 8/23/2023 at 12:37 PM

## 2023-08-24 RX ORDER — BUPROPION HYDROCHLORIDE 450 MG/1
450 TABLET, FILM COATED, EXTENDED RELEASE ORAL EVERY MORNING
Qty: 30 TABLET | Refills: 1 | Status: SHIPPED | OUTPATIENT
Start: 2023-08-24 | End: 2023-11-21

## 2023-08-24 NOTE — TELEPHONE ENCOUNTER
Per phone communication with Dr. García, pt to increase bupropion from 300mg daily to 450mg daily. Writer corrected rx and resubmitted to pharmacy.    Tamela White RN on 8/24/2023 at 11:17 AM

## 2023-08-24 NOTE — TELEPHONE ENCOUNTER
Patient called, expressed they are extremely frustrated because the pharmacy is not filling this prescription. Patient stated they are going out of town soon and needs this medication right away.

## 2023-08-24 NOTE — TELEPHONE ENCOUNTER
[facility] Call [facility]    Phone Message    May a detailed message be left on voicemail: yes     Reason for Call: Medication Refill Request    Has the patient contacted the pharmacy for the refill? Yes   Name of medication being requested: buproprion  Provider who prescribed the medication: gina  Pharmacy:    [place], [place] - [address] [place] [address] [phone]      Date medication is needed:  ASAP - pharmacy calling again asking for clarification. Patient has been calling pharmacy asking for this refill, pharmacy can't move forward without clarification.      Action Taken: Message routed to:  Other: nursing pool, Dr García    Travel Screening: Not Applicable

## 2023-08-28 NOTE — TELEPHONE ENCOUNTER
Letter created and sent to HIM transcription pool to mail out to patient.  Anabella GAUTHIER LPN  [facility]

## 2023-09-06 ENCOUNTER — TELEPHONE (OUTPATIENT)
Dept: PSYCHIATRY | Facility: CLINIC | Age: 48
End: 2023-09-06
Payer: COMMERCIAL

## 2023-09-06 NOTE — TELEPHONE ENCOUNTER
[facility] Call [facility]    Phone Message    May a detailed message be left on voicemail: yes     Reason for Call: Medication Refill Request    Has the patient contacted the pharmacy for the refill? Yes   Name of medication being requested: Subutex  Provider who prescribed the medication: Dr. García  Pharmacy: [place], [place] - [address] [place] [address] [phone]   Date medication is needed: ASAP    Pt is out of medication, says cravings have increased and he took extra doses. He is also wondering if he could increase dosage of medication.    Action Taken: Message routed to:  Other: P PSYCHIATRY NURSE-P    Travel Screening: Not Applicable

## 2023-09-06 NOTE — TELEPHONE ENCOUNTER
Ajith returned call to writer. He reports having one tablet of Subutex left. He has taken an extra dose several days this week due to intense cravings and is wondering if he needs an increased dose.  Per :      Will update Dr García.

## 2023-09-06 NOTE — TELEPHONE ENCOUNTER
Reviewed chart, patient has been relatively stable on this dose for some time. Reportedly has taken multiple additional doses due to increased cravings. Was not experiencing increased cravings at last visit 8/23/23.    Called and spoke with patient. He has been taking twice his normal dose for about a week, initially reported extra doses only for 4-5 days but after some math realized how much he has been taking. Discussed that this is a large increase and not appropriate, patient feels very strongly that this was appropriate because he has been on higher doses in the past. Discussed that dose changes like this absolutely require a visit, and recommended he try to return to previous dose before next visit and use alternative coping skills for cravings, but we will revisit his dose in more detail at his appointment in 2 weeks.    Called pharmacy to approve early refill, insurance accepted refill.    Gonzalez García MD  Addiction Medicine Fellow  Care discussed with Dr. Aguayo

## 2023-09-29 DIAGNOSIS — I10 PRIMARY HYPERTENSION: ICD-10-CM

## 2023-10-02 RX ORDER — LISINOPRIL 10 MG/1
10 TABLET ORAL DAILY
Qty: 90 TABLET | Refills: 3 | Status: SHIPPED | OUTPATIENT
Start: 2023-10-02

## 2023-10-04 DIAGNOSIS — D58.2 HEMOGLOBIN C TRAIT (H): Primary | ICD-10-CM

## 2023-10-05 ENCOUNTER — TELEPHONE (OUTPATIENT)
Dept: PSYCHIATRY | Facility: CLINIC | Age: 48
End: 2023-10-05
Payer: COMMERCIAL

## 2023-10-05 DIAGNOSIS — F11.90 OPIOID USE DISORDER: ICD-10-CM

## 2023-10-05 RX ORDER — BUPRENORPHINE 8 MG/1
8 TABLET SUBLINGUAL 2 TIMES DAILY
Qty: 60 TABLET | Refills: 0 | Status: SHIPPED | OUTPATIENT
Start: 2023-10-05 | End: 2023-11-01

## 2023-10-05 NOTE — TELEPHONE ENCOUNTER
[facility] Call Center    Phone Message    May a detailed message be left on voicemail: yes     Reason for Call: Medication Refill Request    Has the patient contacted the pharmacy for the refill? Yes   Name of medication being requested: buprenorphine (SUBUTEX) 8mg  Provider who prescribed the medication: Dr. García  Pharmacy:    [place], [place] - [address] [place] [address] [phone]  Date medication is needed: Patient will be out of medication tomorrow. Patient asked if this medication could be ready for pickup tonight because pt is going out of town at 3am tomorrow morning.     Action Taken: Message routed to:  Other: [facility] Nurse pool    Travel Screening: Not Applicable

## 2023-10-05 NOTE — TELEPHONE ENCOUNTER
Patient requesting for call back on med refill update. He stated he needs to  tonight as he is leaving town tomorrow at 3am. Patient request msg be marked urgent.

## 2023-10-05 NOTE — TELEPHONE ENCOUNTER
- Meds refilled by provider   - Med tab changed to reflect this     Reached out to patient to update regarding refills. No answer at number provided. Unable to LVM. Writer left SMS notification.

## 2023-10-05 NOTE — TELEPHONE ENCOUNTER
Last seen: 8/23  RTC: 4 weeks   Cancel: none   No-show: 9/20  Next appt: 11/1    Incoming refill from patient via phone         Disp Refills Start End LORRAINE    buprenorphine (SUBUTEX) 8 MG SUBL sublingual tablet 60 tablet 0 8/23/2023  No   Sig - Route: Place 1 tablet (8 mg) under the tongue 2 times daily - Sublingual   Sent to pharmacy as: Buprenorphine HCl 8 MG Sublingual Tablet Sublingual (SUBUTEX)   Class: E-Prescribe   Order: [id number]   E-Prescribing Status: Receipt confirmed by pharmacy (8/23/2023 11:08 AM CDT)   Per  last refilled: 9/7 #30, 8/11 #60, 7/13 #60    Will reach out to provider for approval

## 2023-10-06 ENCOUNTER — PRE VISIT (OUTPATIENT)
Dept: ONCOLOGY | Facility: CLINIC | Age: 48
End: 2023-10-06
Payer: COMMERCIAL

## 2023-11-01 ENCOUNTER — VIRTUAL VISIT (OUTPATIENT)
Dept: PSYCHIATRY | Facility: CLINIC | Age: 48
End: 2023-11-01
Attending: STUDENT IN AN ORGANIZED HEALTH CARE EDUCATION/TRAINING PROGRAM
Payer: COMMERCIAL

## 2023-11-01 ENCOUNTER — TELEPHONE (OUTPATIENT)
Dept: PSYCHIATRY | Facility: CLINIC | Age: 48
End: 2023-11-01

## 2023-11-01 DIAGNOSIS — G56.03 BILATERAL CARPAL TUNNEL SYNDROME: Primary | ICD-10-CM

## 2023-11-01 DIAGNOSIS — F11.90 OPIOID USE DISORDER: ICD-10-CM

## 2023-11-01 DIAGNOSIS — F41.8 DEPRESSION WITH ANXIETY: ICD-10-CM

## 2023-11-01 PROCEDURE — 90833 PSYTX W PT W E/M 30 MIN: CPT | Mod: VID | Performed by: STUDENT IN AN ORGANIZED HEALTH CARE EDUCATION/TRAINING PROGRAM

## 2023-11-01 PROCEDURE — 99214 OFFICE O/P EST MOD 30 MIN: CPT | Mod: VID | Performed by: STUDENT IN AN ORGANIZED HEALTH CARE EDUCATION/TRAINING PROGRAM

## 2023-11-01 RX ORDER — BUPRENORPHINE 8 MG/1
8 TABLET SUBLINGUAL 3 TIMES DAILY
Qty: 90 TABLET | Refills: 0 | Status: SHIPPED | OUTPATIENT
Start: 2023-11-03 | End: 2023-11-29

## 2023-11-01 RX ORDER — BUPRENORPHINE 8 MG/1
8 TABLET SUBLINGUAL 2 TIMES DAILY
Qty: 6 TABLET | Refills: 0 | Status: SHIPPED | OUTPATIENT
Start: 2023-11-01 | End: 2023-11-29

## 2023-11-01 ASSESSMENT — PATIENT HEALTH QUESTIONNAIRE - PHQ9
10. IF YOU CHECKED OFF ANY PROBLEMS, HOW DIFFICULT HAVE THESE PROBLEMS MADE IT FOR YOU TO DO YOUR WORK, TAKE CARE OF THINGS AT HOME, OR GET ALONG WITH OTHER PEOPLE: NOT DIFFICULT AT ALL
SUM OF ALL RESPONSES TO PHQ QUESTIONS 1-9: 4
SUM OF ALL RESPONSES TO PHQ QUESTIONS 1-9: 4

## 2023-11-01 NOTE — TELEPHONE ENCOUNTER
Prior Authorization Retail Medication Request    Medication/Dose: buprenorphine (SUBUTEX) 8 MG SUBL sublingual tablet- Place 1 tablet (8 mg) under the tongue 3 times daily - Sublingual  ICD code (if different than what is on RX):  Opioid use disorder   Previously Tried and Failed: Clonidine, Quetiapine  Rationale: Patient has a long standing history opoid use disorder and is prescribed Subutex. He was prescribed Suboxone since 2022 but has been experiencing nausea, vomiting and feeling very sick.  He is recommended to take Subutex to help with cravings. He was started on Subutex 8 mg and is being titrated up to 24 mg due to increase cravings. If patient is not able to trial this medication, he is at a high risk for decompensation and re-admitted in the hospital.       Insurance Name:  not provided   Insurance ID:  not provided       Pharmacy Information (if different than what is on RX)  Name:  same   Phone:  same

## 2023-11-01 NOTE — TELEPHONE ENCOUNTER
Likely PA  Received: Today  Gonzalez García MD  P Psychiatry Nurse-[facility]  Good morning,    This patient needs to increase his Subutex from 16mg daily to 24mg daily. We have had some difficulty with PAs in the past with his insurance, so I want to give you a heads up that we will likely require a PA again. I sent a 3 day prescription at his previous approved dose (16mg daily), and a 1 month prescription at the higher dose. Would you be able to follow up with the pharmacy to see if we need to submit a new PA? Please let me know if a letter of necessity or other follow-up is required on my end.    Thank you,  Gonzalez García MD    Follow up:     Reached out to [facility] pharmacy and spoke with staff who confirmed, Subutex 24 mg went through insurance for $1 co-pay.     Reached out to patient and updated him that Subutex 24 mg daily dose went through his insurance. He verbalized understanding and will  the rx from the pharmacy today.

## 2023-11-01 NOTE — TELEPHONE ENCOUNTER
[facility] Call Center    Phone Message    May a detailed message be left on voicemail: yes     Reason for Call: Medication Refill Request    Has the patient contacted the pharmacy for the refill? Yes   Name of medication being requested: Wellbutrin  Provider who prescribed the medication: Dr. García  Pharmacy: [facility]  Date medication is needed: ASAP    Patient says pharmacy gave him 300mg when he should have 450mg    Action Taken: Other: P PSYCHIATRY NURSE-P    Travel Screening: Not Applicable

## 2023-11-01 NOTE — NURSING NOTE
Is the patient currently in the state of [place]? YES    Visit mode:VIDEO    If the visit is dropped, the patient can be reconnected by: VIDEO VISIT: Text to cell phone:   Telephone Information:   Mobile [phone]       Will anyone else be joining the visit? NO  (If patient encounters technical issues they should call [phone]    How would you like to obtain your AVS? MyChart    Are changes needed to the allergy or medication list? No    Reason for visit: No chief complaint on file.    Zainab DAVISF
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
## 2023-11-01 NOTE — CONFIDENTIAL NOTE
Writer spoke with [facility].  The Wellbutrin prescription for 450 mg on 8/24/23 had to be divided into 300 mg and 150 mg, as the 450s were not available.    The patient recently picked up a 300 mg prescription - and now needs a separate 150 mg rx to complete the dose.    Pended Wellbutrin  mg rx sent to provider for review / edits.

## 2023-11-01 NOTE — PROGRESS NOTES
Virtual Visit Details    Type of service:  Video Visit     Originating Location (pt. Location): School    Distant Location (provider location):  On-site  Platform used for Video Visit: Noble

## 2023-11-03 RX ORDER — BUPROPION HYDROCHLORIDE 150 MG/1
150 TABLET ORAL EVERY MORNING
Qty: 23 TABLET | Refills: 0 | Status: SHIPPED | OUTPATIENT
Start: 2023-11-03 | End: 2023-11-21

## 2023-11-03 NOTE — CONFIDENTIAL NOTE
Writer spoke with patient - he has been taking buPROPion HCl ER, XL, 300 MG TB24 since 10/26.  Pharmacy is requiring a separate 150 mg prescription to cover the 450 mg prescribed (they do not have 450 mg tabs).    Pended 150 mg prescription #23 day supply sent to provider for review.  The supply of 23 was discussed with patient - this will get both the 300 mg and 150 mg prescriptions in alignment for the next refill.    ________________    Dr. García approved 150 mg rx - sent to pharmacy. Patient notified.      Per Pharmacy, 450 mg tabs are rare.  They request rxs be written as 300 mg and 150 mg going forward.

## 2023-11-20 NOTE — PATIENT INSTRUCTIONS
**For crisis resources, please see the information at the end of this document**   Patient Education    Thank you for coming to the [facility] [place] [facility].     Lab Testing:  If you had lab testing today and your results are reassuring or normal they will be mailed to you or sent through [email or web address] within 7 days. If the lab tests need quick action we will call you with the results. The phone number we will call with results is # [phone]. If this is not the best number please call our clinic and change the number.     Medication Refills:  If you need any refills please call your pharmacy and they will contact us. Our fax number for refills is [phone].   Three business days of notice are needed for general medication refill requests.   Five business days of notice are needed for controlled substance refill requests.   If you need to change to a different pharmacy, please contact the new pharmacy directly. The new pharmacy will help you get your medications transferred.     Contact Us:  Please call [phone] during business hours (8-5:00 M-F).   If you have medication related questions after clinic hours, or on the weekend, please call [phone].     Financial Assistance [phone]   Medical Records [phone]       MENTAL HEALTH CRISIS RESOURCES:  For a emergency help, please call 911 or go to the nearest Emergency Department.     Emergency Walk-In Options:   [facility] Unit @ [facility] ([place]): [phone] - Specialized mental health emergency area designed to be calming  [facility] ([place]): [phone]  [facility] ([place]): [phone]  [facility]): [phone]    [facility] Crisis Information:   [place]: [phone]  [place]: [phone]  [place] ([place]) - Adult: [phone]     Child: [phone]  [place] - Adult: [phone]     Child: [phone]  [place]:  [phone]  List of all [facility] resources:   https://[place].gov/dhs/people-we-serve/adults/health-care/mental-health/resources/crisis-contacts.jsp    National Crisis Information:   Crisis Text Line: Text  [place]  to 402259  Suicide & Crisis Lifeline: 988  National Suicide Prevention Lifeline: 1-537-279-TALK (1-410.503.6165)       For online chat options, visit https://suicidepreventionlifeline.org/chat/  Poison Control Center: 2-779-707-9164  Trans Lifeline: 3-985-040-7095 - Hotline for transgender people of all ages  [employer]: 2-833-293-8448 - Hotline for LGBT youth     For Non-Emergency Support:   Fast Tracker: Mental Health & Substance Use Disorder Resources -   https://www.[place].org/         No

## 2023-11-21 DIAGNOSIS — F41.8 DEPRESSION WITH ANXIETY: ICD-10-CM

## 2023-11-21 RX ORDER — BUPROPION HYDROCHLORIDE 300 MG/1
300 TABLET ORAL EVERY MORNING
Qty: 30 TABLET | Refills: 3 | Status: SHIPPED | OUTPATIENT
Start: 2023-11-21 | End: 2024-03-14

## 2023-11-21 RX ORDER — BUPROPION HYDROCHLORIDE 150 MG/1
150 TABLET ORAL EVERY MORNING
Qty: 30 TABLET | Refills: 3 | Status: SHIPPED | OUTPATIENT
Start: 2023-11-21 | End: 2024-03-14

## 2023-11-29 ENCOUNTER — TELEPHONE (OUTPATIENT)
Dept: PSYCHIATRY | Facility: CLINIC | Age: 48
End: 2023-11-29
Payer: COMMERCIAL

## 2023-11-29 DIAGNOSIS — F11.90 OPIOID USE DISORDER: ICD-10-CM

## 2023-11-29 RX ORDER — BUPRENORPHINE 8 MG/1
8 TABLET SUBLINGUAL 3 TIMES DAILY
Qty: 42 TABLET | Refills: 0 | Status: SHIPPED | OUTPATIENT
Start: 2023-11-29 | End: 2023-12-06

## 2023-11-29 NOTE — TELEPHONE ENCOUNTER
Writer spoke with Ajith. He reports forgetting that appointment was today, he thought it was next week.   He set up a new appointment for Wednesday 12/6/23 at 8am. Aijth reports he can't do an in-person visit due to school, but can attend virtually.  Writer explained that UDS was due and needed to be completed. Ajith agreed to complete UDS prior to 12/6/23 visit.   Updated Ajith that a two week supply of Subutex was sent to his pharmacy by Dr García.

## 2023-11-29 NOTE — TELEPHONE ENCOUNTER
Patient no-showed visit scheduled 11/29/2023. Reviewed chart, he will be running out of medication in a few days. Sent a 2 week refill of Subutex to allow him to reschedule appointment.    Please contact patient to inform him of refill and reschedule appointment. Strongly prefer in-person appointment as he needs updated UDS. No further refills be sent without appointment.    Gonzalez García MD

## 2023-11-30 DIAGNOSIS — D58.2 HEMOGLOBIN C TRAIT (H): ICD-10-CM

## 2023-11-30 DIAGNOSIS — D64.9 ANEMIA, UNSPECIFIED TYPE: Primary | ICD-10-CM

## 2023-11-30 NOTE — ADDENDUM NOTE
Addended by: YARIEL AMADOR on: 11/29/2023 08:11 PM     Modules accepted: Orders     individual instruction/verbal instruction

## 2023-12-01 ENCOUNTER — TELEPHONE (OUTPATIENT)
Dept: INTERNAL MEDICINE | Facility: CLINIC | Age: 48
End: 2023-12-01

## 2023-12-01 NOTE — TELEPHONE ENCOUNTER
I spoke with Ajith today. He apologized for missing the visit yesterday, reporting he thought it was next week. We discussed the e-consult option. Ajith would prefer to check with his insurance first to determine potential costs prior to an e-consult being conducted. I asked him to notify the clinic of his decision afterwards.     Eva Day) ROHINI Elizalde

## 2023-12-01 NOTE — TELEPHONE ENCOUNTER
I received a message from hematology that Ajith didn't make it to his consult appointment with them yesterday.  They recommended doing an e-consult.  Could someone please call Ajith to see if he wants to proceed with an e-consult?    E-Consults are covered by insurance.  Depending on your plan and deductible the maximum fee for an E-Consult is $125. If the specialist recommends a scheduled visit, then no fee is billed.     Thanks,  John Clarke MD

## 2023-12-02 ENCOUNTER — HEALTH MAINTENANCE LETTER (OUTPATIENT)
Age: 48
End: 2023-12-02

## 2023-12-06 ENCOUNTER — TELEPHONE (OUTPATIENT)
Dept: PSYCHIATRY | Facility: CLINIC | Age: 48
End: 2023-12-06
Payer: COMMERCIAL

## 2023-12-06 ENCOUNTER — VIRTUAL VISIT (OUTPATIENT)
Dept: PSYCHIATRY | Facility: CLINIC | Age: 48
End: 2023-12-06
Attending: STUDENT IN AN ORGANIZED HEALTH CARE EDUCATION/TRAINING PROGRAM
Payer: COMMERCIAL

## 2023-12-06 DIAGNOSIS — F11.90 OPIOID USE DISORDER: ICD-10-CM

## 2023-12-06 DIAGNOSIS — F41.8 DEPRESSION WITH ANXIETY: ICD-10-CM

## 2023-12-06 PROCEDURE — 90833 PSYTX W PT W E/M 30 MIN: CPT | Mod: VID | Performed by: STUDENT IN AN ORGANIZED HEALTH CARE EDUCATION/TRAINING PROGRAM

## 2023-12-06 PROCEDURE — 99214 OFFICE O/P EST MOD 30 MIN: CPT | Mod: VID | Performed by: STUDENT IN AN ORGANIZED HEALTH CARE EDUCATION/TRAINING PROGRAM

## 2023-12-06 RX ORDER — BUPRENORPHINE 8 MG/1
8 TABLET SUBLINGUAL 3 TIMES DAILY
Qty: 90 TABLET | Refills: 0 | Status: SHIPPED | OUTPATIENT
Start: 2023-12-06 | End: 2024-01-10

## 2023-12-06 RX ORDER — QUETIAPINE FUMARATE 150 MG/1
150 TABLET, FILM COATED ORAL DAILY
Qty: 30 TABLET | Refills: 0 | Status: SHIPPED | OUTPATIENT
Start: 2023-12-06 | End: 2024-01-10

## 2023-12-06 ASSESSMENT — PATIENT HEALTH QUESTIONNAIRE - PHQ9
SUM OF ALL RESPONSES TO PHQ QUESTIONS 1-9: 0
SUM OF ALL RESPONSES TO PHQ QUESTIONS 1-9: 0
10. IF YOU CHECKED OFF ANY PROBLEMS, HOW DIFFICULT HAVE THESE PROBLEMS MADE IT FOR YOU TO DO YOUR WORK, TAKE CARE OF THINGS AT HOME, OR GET ALONG WITH OTHER PEOPLE: NOT DIFFICULT AT ALL
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
## 2023-12-06 NOTE — TELEPHONE ENCOUNTER
Refill was sent this morning during visit, may not be able to  until next week after 2-week supply was picked up last week so he should contact his pharmacy to determine exact date it will be available.    Gonzalez García MD

## 2023-12-06 NOTE — TELEPHONE ENCOUNTER
[facility] Call Center    Phone Message    May a detailed message be left on voicemail: yes     Reason for Call: Other: Medication Request     Caller stated he picked up his buprenorphine on 11/25 or 11/26 and only received 42 tablets, which is half of what he should have received. Caller is hoping to get the other half sent to    [facility] [place], [place] - [address] [place] [address] [phone]    Action Taken: Message routed to:  Other: [facility] Nurse [place]    Travel Screening: Not Applicable

## 2023-12-06 NOTE — NURSING NOTE
Is the patient currently in the state of [place]? YES    Visit mode:VIDEO    If the visit is dropped, the patient can be reconnected by: VIDEO VISIT: Send to e-mail at: [email or web address]    Will anyone else be joining the visit? NO  (If patient encounters technical issues they should call [phone]    How would you like to obtain your AVS? MyChart    Are changes needed to the allergy or medication list? Pt stated no changes to allergies and Pt stated no med changes    Reason for visit: GINA MATT

## 2023-12-11 ENCOUNTER — LAB (OUTPATIENT)
Dept: LAB | Facility: CLINIC | Age: 48
End: 2023-12-11
Payer: COMMERCIAL

## 2023-12-11 DIAGNOSIS — D64.9 ANEMIA, UNSPECIFIED TYPE: ICD-10-CM

## 2023-12-11 DIAGNOSIS — D58.2 HEMOGLOBIN C TRAIT (H): ICD-10-CM

## 2023-12-11 DIAGNOSIS — F11.90 OPIOID USE DISORDER: ICD-10-CM

## 2023-12-11 LAB
ALBUMIN SERPL BCG-MCNC: 4.4 G/DL (ref 3.5–5.2)
ALP SERPL-CCNC: 71 U/L (ref 40–150)
ALT SERPL W P-5'-P-CCNC: 26 U/L (ref 0–70)
AMPHETAMINES UR QL SCN: NORMAL
ANION GAP SERPL CALCULATED.3IONS-SCNC: 9 MMOL/L (ref 7–15)
AST SERPL W P-5'-P-CCNC: 23 U/L (ref 0–45)
BARBITURATES UR QL SCN: NORMAL
BASOPHILS # BLD AUTO: 0 10E3/UL (ref 0–0.2)
BASOPHILS NFR BLD AUTO: 1 %
BENZODIAZ UR QL SCN: NORMAL
BILIRUB SERPL-MCNC: 0.4 MG/DL
BUN SERPL-MCNC: 9.7 MG/DL (ref 6–20)
BZE UR QL SCN: NORMAL
CALCIUM SERPL-MCNC: 9.4 MG/DL (ref 8.6–10)
CANNABINOIDS UR QL SCN: NORMAL
CHLORIDE SERPL-SCNC: 104 MMOL/L (ref 98–107)
CREAT SERPL-MCNC: 0.73 MG/DL (ref 0.67–1.17)
CRP SERPL-MCNC: 7.09 MG/L
DEPRECATED HCO3 PLAS-SCNC: 26 MMOL/L (ref 22–29)
EGFRCR SERPLBLD CKD-EPI 2021: >90 ML/MIN/1.73M2
EOSINOPHIL # BLD AUTO: 0.1 10E3/UL (ref 0–0.7)
EOSINOPHIL NFR BLD AUTO: 1 %
ERYTHROCYTE [DISTWIDTH] IN BLOOD BY AUTOMATED COUNT: 13.8 % (ref 10–15)
FENTANYL UR QL: NORMAL
FERRITIN SERPL-MCNC: 81 NG/ML (ref 31–409)
FOLATE SERPL-MCNC: 6.4 NG/ML (ref 4.6–34.8)
GLUCOSE SERPL-MCNC: 113 MG/DL (ref 70–99)
HCT VFR BLD AUTO: 33.3 % (ref 40–53)
HGB BLD-MCNC: 12.2 G/DL (ref 13.3–17.7)
IMM GRANULOCYTES # BLD: 0 10E3/UL
IMM GRANULOCYTES NFR BLD: 1 %
IRON BINDING CAPACITY (ROCHE): 351 UG/DL (ref 240–430)
IRON SATN MFR SERPL: 15 % (ref 15–46)
IRON SERPL-MCNC: 53 UG/DL (ref 61–157)
LYMPHOCYTES # BLD AUTO: 2.1 10E3/UL (ref 0.8–5.3)
LYMPHOCYTES NFR BLD AUTO: 35 %
MCH RBC QN AUTO: 28.6 PG (ref 26.5–33)
MCHC RBC AUTO-ENTMCNC: 36.6 G/DL (ref 31.5–36.5)
MCV RBC AUTO: 78 FL (ref 78–100)
MONOCYTES # BLD AUTO: 0.4 10E3/UL (ref 0–1.3)
MONOCYTES NFR BLD AUTO: 7 %
NEUTROPHILS # BLD AUTO: 3.3 10E3/UL (ref 1.6–8.3)
NEUTROPHILS NFR BLD AUTO: 55 %
NRBC # BLD AUTO: 0 10E3/UL
NRBC BLD AUTO-RTO: 0 /100
OPIATES UR QL SCN: NORMAL
PCP QUAL URINE (ROCHE): NORMAL
PLATELET # BLD AUTO: 244 10E3/UL (ref 150–450)
POTASSIUM SERPL-SCNC: 3.8 MMOL/L (ref 3.4–5.3)
PROT SERPL-MCNC: 8.1 G/DL (ref 6.4–8.3)
RBC # BLD AUTO: 4.27 10E6/UL (ref 4.4–5.9)
RETIC HEMOGLOBIN: 32.6 PG (ref 28.2–35.7)
RETICS # AUTO: 0.06 10E6/UL (ref 0.03–0.1)
RETICS/RBC NFR AUTO: 1.5 % (ref 0.5–2)
SODIUM SERPL-SCNC: 139 MMOL/L (ref 135–145)
WBC # BLD AUTO: 6 10E3/UL (ref 4–11)

## 2023-12-11 PROCEDURE — 80053 COMPREHEN METABOLIC PANEL: CPT | Performed by: PATHOLOGY

## 2023-12-11 PROCEDURE — 81269 HBA1/HBA2 GENE DUP/DEL VRNTS: CPT | Mod: 90 | Performed by: PATHOLOGY

## 2023-12-11 PROCEDURE — 82607 VITAMIN B-12: CPT | Performed by: INTERNAL MEDICINE

## 2023-12-11 PROCEDURE — 84238 ASSAY NONENDOCRINE RECEPTOR: CPT | Mod: 90 | Performed by: PATHOLOGY

## 2023-12-11 PROCEDURE — 85025 COMPLETE CBC W/AUTO DIFF WBC: CPT | Performed by: PATHOLOGY

## 2023-12-11 PROCEDURE — 86140 C-REACTIVE PROTEIN: CPT | Performed by: PATHOLOGY

## 2023-12-11 PROCEDURE — 81259 HBA1/HBA2 FULL GENE SEQUENCE: CPT | Mod: 90 | Performed by: PATHOLOGY

## 2023-12-11 PROCEDURE — 82728 ASSAY OF FERRITIN: CPT | Performed by: PATHOLOGY

## 2023-12-11 PROCEDURE — 85046 RETICYTE/HGB CONCENTRATE: CPT | Performed by: PATHOLOGY

## 2023-12-11 PROCEDURE — G0480 DRUG TEST DEF 1-7 CLASSES: HCPCS | Mod: 90 | Performed by: PATHOLOGY

## 2023-12-11 PROCEDURE — 36415 COLL VENOUS BLD VENIPUNCTURE: CPT | Performed by: PATHOLOGY

## 2023-12-11 PROCEDURE — 80307 DRUG TEST PRSMV CHEM ANLYZR: CPT | Performed by: STUDENT IN AN ORGANIZED HEALTH CARE EDUCATION/TRAINING PROGRAM

## 2023-12-11 PROCEDURE — 82525 ASSAY OF COPPER: CPT | Mod: 90 | Performed by: PATHOLOGY

## 2023-12-11 PROCEDURE — 99000 SPECIMEN HANDLING OFFICE-LAB: CPT | Performed by: PATHOLOGY

## 2023-12-11 PROCEDURE — 83540 ASSAY OF IRON: CPT | Performed by: PATHOLOGY

## 2023-12-11 PROCEDURE — 83550 IRON BINDING TEST: CPT | Performed by: PATHOLOGY

## 2023-12-11 PROCEDURE — 83921 ORGANIC ACID SINGLE QUANT: CPT | Performed by: INTERNAL MEDICINE

## 2023-12-11 PROCEDURE — 82746 ASSAY OF FOLIC ACID SERUM: CPT | Performed by: INTERNAL MEDICINE

## 2023-12-12 LAB
STFR SERPL-MCNC: 3.1 MG/L
VIT B12 SERPL-MCNC: 299 PG/ML (ref 232–1245)

## 2023-12-13 LAB
COPPER SERPL-MCNC: 125.4 UG/DL
METHYLMALONATE SERPL-SCNC: 0.33 UMOL/L (ref 0–0.4)

## 2023-12-14 LAB
BUPRENORPHINE UR-MCNC: 60 NG/ML
BUPRENORPHINE UR-MCNC: >1000 NG/ML
NALOXONE UR CFM-MCNC: <100 NG/ML
NORBUPRENORPHINE UR CFM-MCNC: >1000 NG/ML
NORBUPRENORPHINE UR-MCNC: 722 NG/ML

## 2023-12-19 LAB
HBA SEQ, DEL/DUP INTERP: NEGATIVE
SPECIMEN SOURCE: NORMAL
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
## 2024-01-10 ENCOUNTER — VIRTUAL VISIT (OUTPATIENT)
Dept: PSYCHIATRY | Facility: CLINIC | Age: 49
End: 2024-01-10
Attending: STUDENT IN AN ORGANIZED HEALTH CARE EDUCATION/TRAINING PROGRAM
Payer: COMMERCIAL

## 2024-01-10 DIAGNOSIS — F11.90 OPIOID USE DISORDER: ICD-10-CM

## 2024-01-10 DIAGNOSIS — F41.8 DEPRESSION WITH ANXIETY: ICD-10-CM

## 2024-01-10 PROCEDURE — 99214 OFFICE O/P EST MOD 30 MIN: CPT | Mod: 95 | Performed by: STUDENT IN AN ORGANIZED HEALTH CARE EDUCATION/TRAINING PROGRAM

## 2024-01-10 RX ORDER — QUETIAPINE FUMARATE 150 MG/1
75-150 TABLET, FILM COATED ORAL AT BEDTIME
Qty: 30 TABLET | Refills: 1 | Status: SHIPPED | OUTPATIENT
Start: 2024-01-10 | End: 2024-03-14

## 2024-01-10 RX ORDER — BUPRENORPHINE 8 MG/1
8 TABLET SUBLINGUAL 3 TIMES DAILY
Qty: 90 TABLET | Refills: 0 | Status: SHIPPED | OUTPATIENT
Start: 2024-01-10 | End: 2024-03-14

## 2024-01-10 ASSESSMENT — PATIENT HEALTH QUESTIONNAIRE - PHQ9
SUM OF ALL RESPONSES TO PHQ QUESTIONS 1-9: 0
SUM OF ALL RESPONSES TO PHQ QUESTIONS 1-9: 0

## 2024-01-10 ASSESSMENT — PAIN SCALES - GENERAL: PAINLEVEL: NO PAIN (0)

## 2024-01-10 NOTE — NURSING NOTE
Is the patient currently in the state of [place]? YES    Visit mode:VIDEO    If the visit is dropped, the patient can be reconnected by: VIDEO VISIT: Send to e-mail at: [email or web address]    Will anyone else be joining the visit? NO  (If patient encounters technical issues they should call [phone] :[phone])    How would you like to obtain your AVS? MyChart    Are changes needed to the allergy or medication list? No medications have not changed per patient    Reason for visit: RECHECK    Bri MATT

## 2024-01-10 NOTE — PATIENT INSTRUCTIONS
**For crisis resources, please see the information at the end of this document**   Patient Education    Thank you for coming to the [facility].     Lab Testing:  If you had lab testing today and your results are reassuring or normal they will be mailed to you or sent through [employer] within 7 days. If the lab tests need quick action we will call you with the results. The phone number we will call with results is # [phone]. If this is not the best number please call our clinic and change the number.     Medication Refills:  If you need any refills please call your pharmacy and they will contact us. Our fax number for refills is [phone].   Three business days of notice are needed for general medication refill requests.   Five business days of notice are needed for controlled substance refill requests.   If you need to change to a different pharmacy, please contact the new pharmacy directly. The new pharmacy will help you get your medications transferred.     Contact Us:  Please call [phone] during business hours (8-5:00 M-F).   If you have medication related questions after clinic hours, or on the weekend, please call [phone].     Financial Assistance [phone]   Medical Records [phone]       MENTAL HEALTH CRISIS RESOURCES:  For a emergency help, please call 911 or go to the nearest Emergency Department.     Emergency Walk-In Options:   EmPATH Unit @ [facility] ([place]): [phone] - Specialized mental health emergency area designed to be calming  [facility] ([place]): [phone]  [facility] ([place]): [phone]  [facility]): [phone]    [facility] Crisis Information:   [place]: [phone]  [place]: [phone]  [place] ([place]) - Adult: [phone]     Child: [phone]  [place] - Adult: [phone]     Child: [phone]  [place]:  [phone]  List of all [facility] resources:   https://[place].gov/dhs/people-we-serve/adults/health-care/mental-health/resources/crisis-contacts.jsp    National Crisis Information:   Crisis Text Line: Text  [place]  to 968862  Suicide & Crisis Lifeline: 988  National Suicide Prevention Lifeline: 7-678-285-TALK (1-847.165.6888)       For online chat options, visit https://suicidepreventionlifeline.org/chat/  Poison Control Center: 4-233-631-5054  Trans Lifeline: 6-237-625-9764 - Hotline for transgender people of all ages  The [employer] Project: 4-253-260-4032 - Hotline for LGBT youth     For Non-Emergency Support:   Fast Tracker: Mental Health & Substance Use Disorder Resources -   https://www.[employer].org/

## 2024-01-22 NOTE — TELEPHONE ENCOUNTER
RECORDS STATUS - ALL OTHER DIAGNOSIS      RECORDS RECEIVED FROM:     Appt Date: 2/29/2024    Anemia    Action    Action Taken 1/22/2024 12:44pm JAKI     I faxed over a request for imaging to        NOTES STATUS DETAILS   OFFICE NOTE from referring provider John Guidry MD      MEDICATION LIST Complete [facility]   CLINICAL TRIAL TREATMENTS TO DATE     LABS     PATHOLOGY REPORTS     ANYTHING RELATED TO DIAGNOSIS Complete Labs last updated on 12/11/2023    GENONOMIC TESTING     TYPE:     IMAGING (NEED IMAGES & REPORT)     CT SCANS IN PACS-   CT Abdomen Pelvis 3/17/2022   MRI     MAMMO     ULTRASOUND     PET

## 2024-02-10 ENCOUNTER — HEALTH MAINTENANCE LETTER (OUTPATIENT)
Age: 49
End: 2024-02-10

## 2024-02-21 DIAGNOSIS — D64.9 ANEMIA, UNSPECIFIED TYPE: Primary | ICD-10-CM

## 2024-02-29 ENCOUNTER — PRE VISIT (OUTPATIENT)
Dept: TRANSPLANT | Facility: CLINIC | Age: 49
End: 2024-02-29

## 2024-03-14 DIAGNOSIS — F41.8 DEPRESSION WITH ANXIETY: ICD-10-CM

## 2024-03-14 DIAGNOSIS — F11.90 OPIOID USE DISORDER: ICD-10-CM

## 2024-03-14 RX ORDER — BUPROPION HYDROCHLORIDE 150 MG/1
150 TABLET ORAL EVERY MORNING
Qty: 30 TABLET | Refills: 3 | Status: SHIPPED | OUTPATIENT
Start: 2024-03-14 | End: 2024-05-22

## 2024-03-14 RX ORDER — BUPROPION HYDROCHLORIDE 300 MG/1
300 TABLET ORAL EVERY MORNING
Qty: 30 TABLET | Refills: 3 | Status: SHIPPED | OUTPATIENT
Start: 2024-03-14 | End: 2024-06-12

## 2024-03-14 RX ORDER — QUETIAPINE FUMARATE 150 MG/1
75-150 TABLET, FILM COATED ORAL AT BEDTIME
Qty: 30 TABLET | Refills: 1 | Status: SHIPPED | OUTPATIENT
Start: 2024-03-14 | End: 2024-04-24

## 2024-03-14 RX ORDER — BUPRENORPHINE 8 MG/1
8 TABLET SUBLINGUAL 3 TIMES DAILY
Qty: 90 TABLET | Refills: 0 | Status: SHIPPED | OUTPATIENT
Start: 2024-03-14 | End: 2024-04-24

## 2024-03-14 NOTE — TELEPHONE ENCOUNTER
Briefly reviewed chart, and reviewed PDMP which is appropriate. Patient was due for follow-up in February 2024, appears multiple attempts were made to reach him to schedule follow-up. Will provide refill for one month to ensure continuous care, however patient will need to attend next scheduled visit for further refills.     Of note, soonest available appointment is more than one month out due to limited provider availability. Will route to clinic faculty to determine if we should attempt to find sooner availability (possibly with psychiatry resident?) or send additional supply of buprenorphine to cover until visit scheduled 4/24/24.    Gonzalez García MD

## 2024-03-14 NOTE — TELEPHONE ENCOUNTER
Last Seen: 1-10-24  RTC: one month   Cancel: yes   No-Show: none   Next Appt: 4-     Incoming Refill From patient  via phone encounter. Writer also called [place] pharmacy and spoke to Allyson for last refilled .    Medication Requested: buprenorphine (SUBUTEX) 8 MG SUBL sublingual tablet   Directions: Sig - Route: Place 1 tablet (8 mg) under the tongue 3 times daily - Sublingual   Qty: 90   Last Refill: 1-       Medication Requested: buPROPion (WELLBUTRIN XL) 150 MG 24 hr table   Directions: Sig - Route: Take 1 tablet (150 mg) by mouth every morning along with one 300 mg tablet for a total daily dose of 450 mg - Oral   Qty: 90   Last Refill: 2-       Medication Requested: buPROPion (WELLBUTRIN XL) 300 MG 24 hr tablet   Directions: Sig - Route: Take 1 tablet (300 mg) by mouth every morning along with one 150 mg tablet for a total daily dose of 450 mg - Oral   Qty: 30   Last Refill: 2-      Medication Refill pended  Per Refill Protocol

## 2024-03-14 NOTE — TELEPHONE ENCOUNTER
[facility] Call Center    Phone Message    May a detailed message be left on voicemail: yes     Reason for Call: Medication Refill Request    Has the patient contacted the pharmacy for the refill? Yes   Name of medication being requested: quetiapine, Subtex, wellbutrin 150mg & 300mg  Provider who prescribed the medication: Raquel  Pharmacy:    [place], [place] - [address] [place] [address] [phone]    Date medication is needed: ASAP    Pt scheduled for followup on 4/24/24    Action Taken: Message routed to:  Other: Nursing pool    Travel Screening: Not Applicable

## 2024-03-15 ENCOUNTER — TELEPHONE (OUTPATIENT)
Dept: PSYCHIATRY | Facility: CLINIC | Age: 49
End: 2024-03-15

## 2024-03-15 NOTE — TELEPHONE ENCOUNTER
[facility] Prior Authorization Team Request    Medication:  BUPRENORPHINE HCL 8MG SUBL  Dosing: PLACE 1 TABLET UNDER TONGUE THREE TIMES A DAY.  NDC (required for Medicaid members): [address]-01  Insurance Company: [place] MEDICAID  BIN: [id number]  PCN:   Grp:   ID: [id number]  CoverMyMeds Key (if applicable):   Additional documentation:   Pharmacy Filling the Rx:  [facility]  Filling Pharmacy Phone:  [phone]  Contact: P PHARM [facility] PA ([id number]) please send all responses to this pool.  Pharmacy NPI (required for Medicaid members): [id number]
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
## 2024-03-18 NOTE — TELEPHONE ENCOUNTER
PA Initiation    Medication: BUPRENORPHINE HCL 8 MG SL SUBL  Insurance Company: [place] Medicaid ([facility]) - Phone [phone] Fax [phone]  Pharmacy Filling the Rx: [facility] [place], [place] - [address] [place] [address] [phone]  Filling Pharmacy Phone: [phone]  Filling Pharmacy Fax:    Start Date: 3/18/2024  Retail Pharmacy Prior Authorization Team   Phone: [phone]

## 2024-03-20 NOTE — TELEPHONE ENCOUNTER
Drafted letter of appeal which is available in chart, will await response from .    Gonzalez García MD

## 2024-03-20 NOTE — TELEPHONE ENCOUNTER
PRIOR AUTHORIZATION DENIED    Medication: BUPRENORPHINE HCL 8 MG SL SUBL  Insurance Company: [place] Medicaid ([facility]) - Phone [phone] Fax [phone]  Denial Date: 3/19/2024  Denial Reason(s):     Appeal Information:       Patient Notified: Clinic is to notify patient.

## 2024-03-20 NOTE — TELEPHONE ENCOUNTER
Medication Appeal Initiation    Medication: BUPRENORPHINE HCL 8 MG SL SUBL  Appeal Start Date:  3/20/2024  Insurance Company: [employer] Phone:   Insurance Fax: [phone]  Comments:

## 2024-03-22 NOTE — TELEPHONE ENCOUNTER
MEDICATION APPEAL APPROVED    Medication: BUPRENORPHINE HCL 8 MG SL SUBL  Authorization Effective Date: 3/14/2024  Authorization Expiration Date: 3/31/2024  Approved Dose/Quantity:   Reference #:     Appeal Insurance Company: [place] Medicaid  Expected CoPay: $       CoPay Card Available:    Financial Assistance Needed: No  Filling Pharmacy: [place], [place] -  [facility] [id number]  Patient Notified: Yes  Comments:

## 2024-03-25 NOTE — TELEPHONE ENCOUNTER
Will route to nursing pool to inform patient of successful PA. He will need to  his medication by the end of the month; PA expires 3/31/34.    Gonzalez García MD

## 2024-03-25 NOTE — TELEPHONE ENCOUNTER
Writer spoke with Ajith via phone. Updated him that PA was approved and that he needs to  this prescription before 3/31/24 due to PA expiring on that date. He reported understanding and plans to pick this up today.   Encouraged him to reach out to clinic with any questions or concerns.

## 2024-03-29 ENCOUNTER — TELEPHONE (OUTPATIENT)
Dept: PSYCHIATRY | Facility: CLINIC | Age: 49
End: 2024-03-29

## 2024-03-29 NOTE — TELEPHONE ENCOUNTER
Writer called Ajith to gather more information. He was told by Orlando, his new , that we should have the Professional Statement of Need (PSN) form already. Ajith asked that this writer call Orlando directly to discuss further. He provided the following number for her:  [phone]    Writer called the above number. No answer. LVM requesting a c/b to coordinate the completion of the PSN form for Ajith. Clinic phone number provided. [facility] does have PSN form available on line. Will clarify with Orlando that this is all that is needed.    Sonya Whitaker RN on 3/29/2024 at 1:49 PM

## 2024-03-29 NOTE — TELEPHONE ENCOUNTER
[facility]    Phone Message    May a detailed message be left on voicemail: yes     Reason for Call: Form or Letter   Type or form/letter needing completion: professional statement of need  Provider: Dr. Gonzalez García  Date form needed: asap -- pt stated  requested by Monday   Once completed: Fax form to: EMAIL to [email or web address]     Action Taken: Other: [facility]    Travel Screening: Not Applicable

## 2024-04-01 NOTE — TELEPHONE ENCOUNTER
PT called to provide update to correspondence from last week. PT stated his  called this morning and is expecting the call from the clinic. PT stated  is available today.     Phone number for : [phone].

## 2024-04-01 NOTE — TELEPHONE ENCOUNTER
Writer connected with Orlando who confirmed the PNS form can be found online through the [facility]. Writer located this and completed it on behalf of Dr. García. Orlando asked that the completed form is faxed to [phone], ATTN: Orlando Mack.     Dr. García is out of clinic this week. Will ask the attending providers to sign on his behalf. Orlando confirmed the form can be faxed to her later this week.    Sonya Whitaker RN on 4/1/2024 at 12:58 PM

## 2024-04-03 ENCOUNTER — MYC MEDICAL ADVICE (OUTPATIENT)
Dept: PSYCHIATRY | Facility: CLINIC | Age: 49
End: 2024-04-03
Payer: COMMERCIAL

## 2024-04-03 NOTE — TELEPHONE ENCOUNTER
Professional Statement of Need completed and signed by Dr. Carlo Baez on behalf of Dr. Gonzalez García. Forms faxed to [phone].

## 2024-04-03 NOTE — TELEPHONE ENCOUNTER
Writer called Orlando to inquire about whether they have a release of information. Orlando states she will [facility]. Orlando states her email is [email or web address].

## 2024-04-03 NOTE — TELEPHONE ENCOUNTER
Writer called patient and he states that he can complete the MICHELLE if we send it to him via [employer]. Writer sent [employer] message with MICHELLE for Orlando.

## 2024-04-03 NOTE — TELEPHONE ENCOUNTER
Writer called patient with a witness, Anne Marie BURGESS LPN and patient gave a one time verbal consent to send completed Professional Statement of Need to , Orlando Mack at [facility].

## 2024-04-11 NOTE — GROUP NOTE
"Group Therapy Documentation    PATIENT'S NAME: Ajith Delvalle  MRN:   [id number]  :   1975  ACCT. NUMBER: [id number]  DATE OF SERVICE: 1/10/23  START TIME:  9:00 AM  END TIME: 11:00 AM  FACILITATOR(S): Christina Ovalles LADC  TOPIC: BEH Group Therapy  Number of patients attending the group:  6  Group Length:  2 Hours    Group Therapy Type: Recovery strategies    Summary of Group / Topics Discussed:    Recovery Principles and Disease of addiction    Patients completed daily check ins and the question of the day, \" What is 1 specific goal you plan to complete by .\"     Patients presented assignments and shared feedback.    Patients discussed loss and being able to process past events/consequences as an adult and the benefit it has on recovery.      Patients engaged in reading and processing daily meditations.        Group Attendance:  Attended group session    Patient's response to the group topic/interactions:  cooperative with task    Patient appeared to be Actively participating.        Client specific details:  Ajith  attended AM small group therapy. Patient participated and remained engaged throughout group.         "
"Psychoeducation Group Documentation    PATIENT'S NAME: Ajith Delvalle  MRN:   [id number]  :   1975  ACCT. NUMBER: [id number]  DATE OF SERVICE: 1/10/23  START TIME:  3:00 PM  END TIME:  4:00 PM  FACILITATOR(S): Stefany Steele LADC; Betty Ashford; Eva Verma LADC  TOPIC: BEH Pyschoeducation  Number of patients attending the group:   Group Length:  1 Hours    Skills Group Therapy Type: Recovery skills and Emotion regulation skills    Summary of Group / Topics Discussed:    Symptom management skills          Group Attendance:  Attended group session    Patient's response to the group topic/interactions:  cooperative with task    Patient appeared to be Engaged.         Client specific details: Pt was appropriate and attentive in PM skills group, during Dr. Lacey lecture on \"The effect of alcohol on the frontal lobe\".        "
Group Therapy Documentation    PATIENT'S NAME: Ajith Delvalle  MRN:   [id number]  :   1975  ACCT. NUMBER: [id number]  DATE OF SERVICE: 1/10/23  START TIME: 12:30 PM  END TIME:  2:30 PM  FACILITATOR(S): Arben Pompa LADC  TOPIC: BEH Group Therapy  Number of patients attending the group:  6  Group Length:  2 Hours    Group Therapy Type: Emotion processing    Summary of Group / Topics Discussed:    Sober coping skills      Group Attendance:  Attended group session    Patient's response to the group topic/interactions:  cooperative with task    Patient appeared to be Actively participating.        Client specific details:  Ajith participated and interacted appropriately with peers and staff in PM group. No triggers to use noted or discussed.      
PAST SURGICAL HISTORY:  H/O hernia repair     H/O partial thyroidectomy

## 2024-04-24 ENCOUNTER — VIRTUAL VISIT (OUTPATIENT)
Dept: PSYCHIATRY | Facility: CLINIC | Age: 49
End: 2024-04-24
Attending: PSYCHIATRY & NEUROLOGY
Payer: COMMERCIAL

## 2024-04-24 DIAGNOSIS — F11.90 OPIOID USE DISORDER: Primary | ICD-10-CM

## 2024-04-24 DIAGNOSIS — F41.8 DEPRESSION WITH ANXIETY: ICD-10-CM

## 2024-04-24 DIAGNOSIS — F11.20 OPIOID DEPENDENCE ON AGONIST THERAPY (H): ICD-10-CM

## 2024-04-24 PROCEDURE — 90833 PSYTX W PT W E/M 30 MIN: CPT | Mod: 95 | Performed by: STUDENT IN AN ORGANIZED HEALTH CARE EDUCATION/TRAINING PROGRAM

## 2024-04-24 PROCEDURE — 99214 OFFICE O/P EST MOD 30 MIN: CPT | Mod: 95 | Performed by: STUDENT IN AN ORGANIZED HEALTH CARE EDUCATION/TRAINING PROGRAM

## 2024-04-24 RX ORDER — QUETIAPINE FUMARATE 150 MG/1
75-150 TABLET, FILM COATED ORAL AT BEDTIME
Qty: 30 TABLET | Refills: 1 | Status: SHIPPED | OUTPATIENT
Start: 2024-04-24 | End: 2024-05-22

## 2024-04-24 RX ORDER — BUPRENORPHINE 8 MG/1
8 TABLET SUBLINGUAL 3 TIMES DAILY
Qty: 90 TABLET | Refills: 0 | Status: SHIPPED | OUTPATIENT
Start: 2024-04-24 | End: 2024-06-12

## 2024-04-24 ASSESSMENT — PATIENT HEALTH QUESTIONNAIRE - PHQ9
10. IF YOU CHECKED OFF ANY PROBLEMS, HOW DIFFICULT HAVE THESE PROBLEMS MADE IT FOR YOU TO DO YOUR WORK, TAKE CARE OF THINGS AT HOME, OR GET ALONG WITH OTHER PEOPLE: NOT DIFFICULT AT ALL
SUM OF ALL RESPONSES TO PHQ QUESTIONS 1-9: 0
SUM OF ALL RESPONSES TO PHQ QUESTIONS 1-9: 0
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
## 2024-04-24 NOTE — NURSING NOTE
Is the patient currently in the state of [place]? YES    Visit mode:VIDEO    If the visit is dropped, the patient can be reconnected by: VIDEO VISIT: Text to cell phone:   Telephone Information:   Mobile [phone]       Will anyone else be joining the visit? NO  (If patient encounters technical issues they should call [phone]    How would you like to obtain your AVS? MyChart    Are changes needed to the allergy or medication list? Pt stated no changes to allergies and Pt stated no med changes    Are refills needed on medications prescribed by this physician? YES Subutex 8 mg tablet and Wellbutrin  mg tablet, and     Reason for visit: RECHJULIO DAVISF

## 2024-04-24 NOTE — PROGRESS NOTES
Virtual Visit Details    Type of service:  Video Visit     Originating Location (pt. Location): Other parked car (in [place])    Distant Location (provider location):  On-site  Platform used for Video Visit: Noble

## 2024-05-22 ENCOUNTER — VIRTUAL VISIT (OUTPATIENT)
Dept: PSYCHIATRY | Facility: CLINIC | Age: 49
End: 2024-05-22
Attending: STUDENT IN AN ORGANIZED HEALTH CARE EDUCATION/TRAINING PROGRAM
Payer: COMMERCIAL

## 2024-05-22 DIAGNOSIS — F41.8 DEPRESSION WITH ANXIETY: ICD-10-CM

## 2024-05-22 DIAGNOSIS — F11.90 OPIOID USE DISORDER: ICD-10-CM

## 2024-05-22 PROCEDURE — 90833 PSYTX W PT W E/M 30 MIN: CPT | Mod: 95 | Performed by: STUDENT IN AN ORGANIZED HEALTH CARE EDUCATION/TRAINING PROGRAM

## 2024-05-22 PROCEDURE — 99214 OFFICE O/P EST MOD 30 MIN: CPT | Mod: 95 | Performed by: STUDENT IN AN ORGANIZED HEALTH CARE EDUCATION/TRAINING PROGRAM

## 2024-05-22 RX ORDER — QUETIAPINE FUMARATE 150 MG/1
75-150 TABLET, FILM COATED ORAL AT BEDTIME
Qty: 30 TABLET | Refills: 1 | Status: CANCELLED | OUTPATIENT
Start: 2024-05-22

## 2024-05-22 RX ORDER — QUETIAPINE FUMARATE 150 MG/1
75-150 TABLET, FILM COATED ORAL AT BEDTIME
Qty: 30 TABLET | Refills: 0 | Status: SHIPPED | OUTPATIENT
Start: 2024-05-22 | End: 2024-06-12

## 2024-05-22 RX ORDER — BUPROPION HYDROCHLORIDE 150 MG/1
150 TABLET ORAL EVERY MORNING
Qty: 30 TABLET | Refills: 3 | Status: SHIPPED | OUTPATIENT
Start: 2024-05-22 | End: 2024-06-12

## 2024-05-22 RX ORDER — BUPRENORPHINE 8 MG/1
8 TABLET SUBLINGUAL 3 TIMES DAILY
Qty: 90 TABLET | Refills: 0 | Status: CANCELLED | OUTPATIENT
Start: 2024-05-22

## 2024-05-22 NOTE — PATIENT INSTRUCTIONS
**For crisis resources, please see the information at the end of this document**   Patient Education    Thank you for coming to the [facility].     Lab Testing:  If you had lab testing today and your results are reassuring or normal they will be mailed to you or sent through [employer] within 7 days. If the lab tests need quick action we will call you with the results. The phone number we will call with results is # [phone]. If this is not the best number please call our clinic and change the number.     Medication Refills:  If you need any refills please call your pharmacy and they will contact us. Our fax number for refills is [phone].   Three business days of notice are needed for general medication refill requests.   Five business days of notice are needed for controlled substance refill requests.   If you need to change to a different pharmacy, please contact the new pharmacy directly. The new pharmacy will help you get your medications transferred.     Contact Us:  Please call [phone] during business hours (8-5:00 M-F).   If you have medication related questions after clinic hours, or on the weekend, please call [phone].     Financial Assistance [phone]   Medical Records [phone]       MENTAL HEALTH CRISIS RESOURCES:  For a emergency help, please call 911 or go to the nearest Emergency Department.     Emergency Walk-In Options:   EmPATH Unit @ [facility] ([place]): [phone] - Specialized mental health emergency area designed to be calming  [facility] ([place]): [phone]  [facility] ([place]): [phone]  [facility]): [phone]    [facility] Crisis Information:   [place]: [phone]  [place]: [phone]  [place] ([place]) - Adult: [phone]     Child: [phone]  [place] - Adult: [phone]     Child: [phone]  [place]:  [phone]  List of all [facility] resources:   https://[place].gov/dhs/people-we-serve/adults/health-care/mental-health/resources/crisis-contacts.jsp    National Crisis Information:   Crisis Text Line: Text  [place]  to 702146  Suicide & Crisis Lifeline: 988  National Suicide Prevention Lifeline: 3-223-007-TALK (1-220.795.4057)       For online chat options, visit https://suicidepreventionlifeline.org/chat/  Poison Control Center: 5-724-635-6558  Trans Lifeline: 3-180-146-8971 - Hotline for transgender people of all ages  The [employer] Project: 3-607-008-6156 - Hotline for LGBT youth     For Non-Emergency Support:   Fast Tracker: Mental Health & Substance Use Disorder Resources -   https://www.[employer].org/

## 2024-05-22 NOTE — NURSING NOTE
Is the patient currently in the state of [place]? YES    Visit mode:VIDEO    If the visit is dropped, the patient can be reconnected by: VIDEO VISIT: Text to cell phone:   Telephone Information:   Mobile [phone]        Will anyone else be joining the visit? No  (If patient encounters technical issues they should call [phone])    How would you like to obtain your AVS? MyChart    Are changes needed to the allergy or medication list? Yes Pt reported not allergic to peanut.    Are refills needed on medications prescribed by this physician? YES    Rooming Documentation: Unable to complete qnrs due to time.    Reason for visit: RECHECK     Dorothea French, VVF
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
## 2024-05-22 NOTE — PROGRESS NOTES
Virtual Visit Details    Type of service:  Video Visit     Originating Location (pt. Location): Home    Distant Location (provider location):  On-site  Platform used for Video Visit: Noble

## 2024-06-03 DIAGNOSIS — F41.8 DEPRESSION WITH ANXIETY: ICD-10-CM

## 2024-06-03 NOTE — TELEPHONE ENCOUNTER
Last seen: 05/22/2024  RTC: 2 weeks  Cancel: 0  No-show: 0  Next appt: 06/05/2024     Incoming refill from Patient via phone    Medication requested:   Pending Prescriptions:                       Disp   Refills    QUEtiapine Fumarate 150 MG TABS           30 tab*0            Sig: Take  mg by mouth at bedtime      From chart note:   Continue Seroquel 150 mg nightly      Medication unable to be refilled by RN due to criteria not met as indicated.                 []Eligibility - not seen in the last year              []Supervision - no future appointment              []Compliance - no shows, cancellations or lapse in therapy              []Verification - order discrepancy              []Controlled medication              []Medication not included in policy              []90-day supply request              [x]Other:

## 2024-06-03 NOTE — TELEPHONE ENCOUNTER
[facility] Call Center    Phone Message    May a detailed message be left on voicemail: yes     Reason for Call: Medication Refill Request    Has the patient contacted the pharmacy for the refill? Yes   Name of medication being requested: wellbutrin, seroquel  Provider who prescribed the medication: gina  Pharmacy: [place], [place] - [address] [place] [address] [phone]    Date medication is needed:   Pt lost remaining medications over the weekend. Has not had either medication since Saturday, no refills on file with the pharmacy    Action Taken: Message routed to:  Other: nursing pool    Travel Screening: Not Applicable

## 2024-06-06 DIAGNOSIS — F11.90 OPIOID USE DISORDER: ICD-10-CM

## 2024-06-06 NOTE — TELEPHONE ENCOUNTER
[facility] Call Center    Phone Message    May a detailed message be left on voicemail: yes     Reason for Call: Medication Refill Request    Has the patient contacted the pharmacy for the refill? Yes   Name of medication being requested: subutex  Provider who prescribed the medication: gina  Pharmacy: [place], [place] - [address] [place] [address] [phone]    Date medication is needed: has been out of this for 5 days        Action Taken: Message routed to:  Other: nursing pool    Travel Screening: Not Applicable     Date of Service:

## 2024-06-06 NOTE — TELEPHONE ENCOUNTER
Last seen: 5/22  RTC: 2 weeks   Cancel: none   No-show: 6/5  Next appt: 6/12    Incoming refill from patient via phone       Disp Refills Start End LORRAINE    buprenorphine (SUBUTEX) 8 MG SUBL sublingual tablet 90 tablet 0 4/24/2024 -- No   Sig - Route: Place 1 tablet (8 mg) under the tongue 3 times daily - Sublingual   Sent to pharmacy as: Buprenorphine HCl 8 MG Sublingual Tablet Sublingual (SUBUTEX)   Class: E-Prescribe   Order: [id number]   E-Prescribing Status: Receipt confirmed by pharmacy (4/24/2024 10:54 AM CDT)   Per  last refilled: 5/14 #74, 4/24 #16, 3/22 #30    Last Visit Treatment Plan     1) PSYCHOTROPIC MEDICATIONS:  - Continue Subutex at 24mg total daily dose.     Refill decision: Refill pended and routed to the provider for review/determination due to the following criteria not met:     Medication unable to be refilled by RN due to criteria not met as indicated.                 []Eligibility - not seen in the last year              []Supervision - no future appointment              []Compliance - no shows, cancellations or lapse in therapy              []Verification - order discrepancy              [x]Controlled medication              []Medication not included in policy              []90-day supply request              [x]Other: Reached out to patient to confirm medication regimen. Shared he lost his mediation and therefore he ran out of them early.

## 2024-06-07 RX ORDER — BUPRENORPHINE 8 MG/1
8 TABLET SUBLINGUAL 3 TIMES DAILY
Qty: 90 TABLET | Refills: 0 | OUTPATIENT
Start: 2024-06-07

## 2024-06-07 RX ORDER — QUETIAPINE FUMARATE 150 MG/1
75-150 TABLET, FILM COATED ORAL AT BEDTIME
Qty: 30 TABLET | Refills: 0 | OUTPATIENT
Start: 2024-06-07

## 2024-06-07 NOTE — TELEPHONE ENCOUNTER
Reviewed chart. Patient no-showed visit this week, at last visit we discussed in no uncertain terms that he would need to come in person for visit for refills. Received message he was in ED at the time of visit, though no documentation of this available in Care Everywhere. Patient has overbook visit scheduled for next week. Also, received message earlier this week that patient's other meds were lost, unclear why he did not request Subutex refill at that time as well.    In consideration of the above factors, at this point will need visit before further refills. Discussed with nursing, they will contact patient to inform him of this. If he has severe cravings or is concerned he will use over the weekend can present to ED, but can not provide further refills from our clinic without in-person visit.    Gonzalez García MD

## 2024-06-07 NOTE — TELEPHONE ENCOUNTER
Reached out to patient and relayed that provider has refused refills as he would like to see the patient in clinic on 6/12. Patient verbalized understanding. He agreed to come in clinic for withdrawals or cravings

## 2024-06-07 NOTE — TELEPHONE ENCOUNTER
Reached out to patient and relayed that provider has refused refills as he would like to see the patient in clinic on 6/12. Patient verbalized understanding. He agreed to come in clinic for withdrawals or cravings.

## 2024-06-07 NOTE — TELEPHONE ENCOUNTER
See note in separate encounter for recent circumstances. Planned to fill this at visit this week however patient no-showed appointment. Given recent signs of instability and unclear circumstances in which multiple medications were lost, at this point will decline refill request until patient is able to return for visit.    Gonzalez García MD

## 2024-06-12 ENCOUNTER — LAB (OUTPATIENT)
Dept: LAB | Facility: CLINIC | Age: 49
End: 2024-06-12
Attending: PSYCHIATRY & NEUROLOGY
Payer: COMMERCIAL

## 2024-06-12 ENCOUNTER — OFFICE VISIT (OUTPATIENT)
Dept: PSYCHIATRY | Facility: CLINIC | Age: 49
End: 2024-06-12
Attending: PSYCHIATRY & NEUROLOGY
Payer: COMMERCIAL

## 2024-06-12 VITALS
DIASTOLIC BLOOD PRESSURE: 102 MMHG | HEART RATE: 84 BPM | BODY MASS INDEX: 42.51 KG/M2 | SYSTOLIC BLOOD PRESSURE: 157 MMHG | WEIGHT: 262.6 LBS

## 2024-06-12 DIAGNOSIS — F11.20 OPIOID DEPENDENCE ON AGONIST THERAPY (H): ICD-10-CM

## 2024-06-12 DIAGNOSIS — F11.20 OPIOID DEPENDENCE ON AGONIST THERAPY (H): Primary | ICD-10-CM

## 2024-06-12 DIAGNOSIS — F41.8 DEPRESSION WITH ANXIETY: ICD-10-CM

## 2024-06-12 DIAGNOSIS — F11.90 OPIOID USE DISORDER: ICD-10-CM

## 2024-06-12 LAB
AMPHETAMINES UR QL SCN: NORMAL
BARBITURATES UR QL SCN: NORMAL
BENZODIAZ UR QL SCN: NORMAL
BZE UR QL SCN: NORMAL
CANNABINOIDS UR QL SCN: NORMAL
FENTANYL UR QL: NORMAL
OPIATES UR QL SCN: NORMAL
PCP QUAL URINE (ROCHE): NORMAL

## 2024-06-12 PROCEDURE — 90833 PSYTX W PT W E/M 30 MIN: CPT | Performed by: STUDENT IN AN ORGANIZED HEALTH CARE EDUCATION/TRAINING PROGRAM

## 2024-06-12 PROCEDURE — 80348 DRUG SCREENING BUPRENORPHINE: CPT

## 2024-06-12 PROCEDURE — G0463 HOSPITAL OUTPT CLINIC VISIT: HCPCS | Performed by: STUDENT IN AN ORGANIZED HEALTH CARE EDUCATION/TRAINING PROGRAM

## 2024-06-12 PROCEDURE — 80307 DRUG TEST PRSMV CHEM ANLYZR: CPT

## 2024-06-12 PROCEDURE — 99214 OFFICE O/P EST MOD 30 MIN: CPT | Mod: GC | Performed by: STUDENT IN AN ORGANIZED HEALTH CARE EDUCATION/TRAINING PROGRAM

## 2024-06-12 RX ORDER — BUPRENORPHINE 8 MG/1
8 TABLET SUBLINGUAL 3 TIMES DAILY
Qty: 90 TABLET | Refills: 0 | Status: SHIPPED | OUTPATIENT
Start: 2024-06-12 | End: 2024-08-01

## 2024-06-12 RX ORDER — BUPROPION HYDROCHLORIDE 300 MG/1
300 TABLET ORAL EVERY MORNING
Qty: 30 TABLET | Refills: 0 | Status: SHIPPED | OUTPATIENT
Start: 2024-06-12 | End: 2024-07-15

## 2024-06-12 RX ORDER — QUETIAPINE FUMARATE 150 MG/1
75-150 TABLET, FILM COATED ORAL AT BEDTIME
Qty: 30 TABLET | Refills: 0 | Status: SHIPPED | OUTPATIENT
Start: 2024-06-12 | End: 2024-07-15

## 2024-06-12 RX ORDER — BUPROPION HYDROCHLORIDE 150 MG/1
150 TABLET ORAL EVERY MORNING
Qty: 30 TABLET | Refills: 0 | Status: SHIPPED | OUTPATIENT
Start: 2024-06-12 | End: 2024-07-15

## 2024-06-12 ASSESSMENT — PAIN SCALES - GENERAL: PAINLEVEL: NO PAIN (0)

## 2024-06-12 NOTE — NURSING NOTE
Chief Complaint   Patient presents with    Recheck Medication     Opioid dependence on agonist therapy     Blood pressure (!) 162/104, pulse 83, weight 119.1 kg (262 lb 9.6 oz).  Blood pressure (!) 157/102, pulse 84, weight 119.1 kg (262 lb 9.6 oz).      - Nikko Marcos, Visit Facilitator

## 2024-06-12 NOTE — NURSING NOTE
Patient taken to treatment room before rooming/vitals done today. Patient arrived 25 min late.Anne Marie Lema/GHASSAN
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
## 2024-06-16 LAB
BUPRENORPHINE UR-MCNC: <2 NG/ML
BUPRENORPHINE UR-MCNC: <5 NG/ML
NALOXONE UR CFM-MCNC: <100 NG/ML
NORBUPRENORPHINE UR CFM-MCNC: <5 NG/ML
NORBUPRENORPHINE UR-MCNC: <2 NG/ML

## 2024-06-18 ENCOUNTER — TELEPHONE (OUTPATIENT)
Dept: PSYCHIATRY | Facility: CLINIC | Age: 49
End: 2024-06-18
Payer: COMMERCIAL

## 2024-06-18 NOTE — TELEPHONE ENCOUNTER
[facility] Prior Authorization Team Request    Medication: Buprenorphine 8 mg sublingual tablets   Dosing: Place one tablet under the tongue 3 times daily  Qty: 90  Day Supply: 30  NDC (required for Medicaid members): [address]-80     Insurance   BIN: [id number]  [id number]  Grp: [id number]  ID: [id number]    CoverMyMeds Key (if applicable): [id number]    Additional documentation: New PA required due to change in insurance.       Filling Pharmacy: [facility]  Phone Number: [phone]  Contact:  Rad Dow NPI (required for Medicaid members): [id number]

## 2024-06-25 NOTE — TELEPHONE ENCOUNTER
PA Initiation    Medication: BUPRENORPHINE HCL 8 MG SL SUBL  Insurance Company: [email or web address] PMAP - Phone [phone] Fax [phone]  Pharmacy Filling the Rx: [facility] [place], [place] - [address] [place] [address] [id number]  Filling Pharmacy Phone: [phone]  Filling Pharmacy Fax: [phone]  Start Date: 6/25/2024

## 2024-06-26 NOTE — TELEPHONE ENCOUNTER
Prior Authorization Approval    Medication: BUPRENORPHINE HCL 8 MG SL SUBL  Authorization Effective Date: 5/25/2024  Authorization Expiration Date: 6/25/2025  Approved Dose/Quantity: 90/30  Reference #: [id number]   Insurance Company: [employer] - Phone [phone] Fax [phone]  Which Pharmacy is filling the prescription: [facility] [address] [phone]  Pharmacy Notified: y  Patient Notified: y - pharmacy to notify

## 2024-06-29 ENCOUNTER — HEALTH MAINTENANCE LETTER (OUTPATIENT)
Age: 49
End: 2024-06-29

## 2024-07-15 ENCOUNTER — TELEPHONE (OUTPATIENT)
Dept: PSYCHIATRY | Facility: CLINIC | Age: 49
End: 2024-07-15
Payer: COMMERCIAL

## 2024-07-15 DIAGNOSIS — F41.8 DEPRESSION WITH ANXIETY: ICD-10-CM

## 2024-07-15 RX ORDER — BUPROPION HYDROCHLORIDE 150 MG/1
150 TABLET ORAL EVERY MORNING
Qty: 30 TABLET | Refills: 0 | Status: SHIPPED | OUTPATIENT
Start: 2024-07-15 | End: 2024-08-13

## 2024-07-15 RX ORDER — QUETIAPINE FUMARATE 150 MG/1
75-150 TABLET, FILM COATED ORAL AT BEDTIME
Qty: 30 TABLET | Refills: 0 | Status: SHIPPED | OUTPATIENT
Start: 2024-07-15 | End: 2024-08-23

## 2024-07-15 RX ORDER — BUPROPION HYDROCHLORIDE 300 MG/1
300 TABLET ORAL EVERY MORNING
Qty: 30 TABLET | Refills: 0 | Status: SHIPPED | OUTPATIENT
Start: 2024-07-15 | End: 2024-08-13

## 2024-07-15 NOTE — TELEPHONE ENCOUNTER
[facility] Call Center    Phone Message    May a detailed message be left on voicemail: yes     Reason for Call: Medication Refill Request    Has the patient contacted the pharmacy for the refill? Yes   Name of medication being requested: Wellbutrin & Seroquel  Provider who prescribed the medication: Dr. García (txing to Dr. Yousif)  Pharmacy: [place], [place] - [address] [place] [address] [phone]   Date medication is needed: ASAP    Patient states that he is out of medications    Action Taken: Message routed to:  Other: P PSYCHIATRY NURSE-P    Travel Screening: Not Applicable     Date of Service:

## 2024-07-15 NOTE — TELEPHONE ENCOUNTER
[facility] Call Center    Phone Message    May a detailed message be left on voicemail: yes     Reason for Call: Medication Refill Request    Has the patient contacted the pharmacy for the refill? Yes   Name of medication being requested: Wellbutrin 150 mg & Wellbutrin 300mg  Provider who prescribed the medication: Dr. Yousif   Pharmacy: [place], [place] - [address] [place] [address] [phone]   Date medication is needed: ASAP pt is all out and would like to pick the medication by today     Action Taken: Other: nurse pool    Travel Screening: Not Applicable     Date of Service:

## 2024-07-15 NOTE — TELEPHONE ENCOUNTER
Writer returned call to patient to confirm that his prescription refills were sent to his preferred pharmacy.

## 2024-07-15 NOTE — TELEPHONE ENCOUNTER
[facility] Call [facility]    Phone Message    May a detailed message be left on voicemail: yes     Reason for Call: Other: Pt is checking on the status of his refill request. He was told that the medication was ready but the pharmacy hasn't received it.      Action Taken: Other: [facility] psych pool    Travel Screening: Not Applicable     Date of Service:

## 2024-07-15 NOTE — TELEPHONE ENCOUNTER
Last seen: 06/12/2024  RTC: 4 weeks  Cancel: 0  No-show: 0  Next appt: 07/24/2024     Incoming refill from Patient via phone    Medication requested:   Pending Prescriptions:                       Disp   Refills    buPROPion (WELLBUTRIN XL) 150 MG 24 hr ta*30 tab*0            Sig: Take 1 tablet (150 mg) by mouth every morning           along with one 300 mg tablet for a total daily           dose of 450 mg    buPROPion (WELLBUTRIN XL) 300 MG 24 hr ta*30 tab*0            Sig: Take 1 tablet (300 mg) by mouth every morning           along with one 150 mg tablet for a total daily           dose of 450 mg    QUEtiapine Fumarate 150 MG TABS           30 tab*0            Sig: Take  mg by mouth at bedtime    From chart note:   - Continue Seroquel 150 mg nightly  - Continue Wellbutrin XR 450mg daily       Medication unable to be refilled by RN due to criteria not met as indicated.                 []Eligibility - not seen in the last year              []Supervision - no future appointment              []Compliance - no shows, cancellations or lapse in therapy              []Verification - order discrepancy              []Controlled medication              []Medication not included in policy              []90-day supply request              [x]Other:

## 2024-07-31 ENCOUNTER — TELEPHONE (OUTPATIENT)
Dept: PSYCHIATRY | Facility: CLINIC | Age: 49
End: 2024-07-31
Payer: COMMERCIAL

## 2024-07-31 DIAGNOSIS — F11.90 OPIOID USE DISORDER: ICD-10-CM

## 2024-07-31 NOTE — TELEPHONE ENCOUNTER
"Reached out to patient to check on regarding medication regimen. Patient shared he ran out of Subutex few months ago. Per  last refilled on 6/28, 5/14 and 4/24.   When asked again, he shared \" I can't really remember too much.\" Patient reports running out of Subutex few days ago. Denies any cravings or withdrawals at this time. Writer agreed to reach out to provider.     "

## 2024-08-01 RX ORDER — BUPRENORPHINE 8 MG/1
8 TABLET SUBLINGUAL 3 TIMES DAILY
Qty: 90 TABLET | Refills: 0 | Status: SHIPPED | OUTPATIENT
Start: 2024-08-01 | End: 2024-09-04

## 2024-08-13 DIAGNOSIS — F41.8 DEPRESSION WITH ANXIETY: ICD-10-CM

## 2024-08-13 RX ORDER — BUPROPION HYDROCHLORIDE 150 MG/1
150 TABLET ORAL EVERY MORNING
Qty: 30 TABLET | Refills: 0 | Status: SHIPPED | OUTPATIENT
Start: 2024-08-13 | End: 2024-09-11

## 2024-08-13 RX ORDER — BUPROPION HYDROCHLORIDE 300 MG/1
300 TABLET ORAL EVERY MORNING
Qty: 30 TABLET | Refills: 0 | Status: SHIPPED | OUTPATIENT
Start: 2024-08-13 | End: 2024-09-11

## 2024-08-13 NOTE — TELEPHONE ENCOUNTER
[facility] Call Center    Phone Message    May a detailed message be left on voicemail: yes     Reason for Call: Medication Refill Request    Has the patient contacted the pharmacy for the refill? Yes   Name of medication being requested: Bupropion  Provider who prescribed the medication: Dr. Yousif  Pharmacy: [address] [phone]   Date medication is needed: ASAP, patient is completely out and needs today    Needs PA    Action Taken: Message routed to:  Other: P PSYCHIATRY NURSE-P    Travel Screening: Not Applicable     Date of Service:

## 2024-08-13 NOTE — TELEPHONE ENCOUNTER
Last seen: 06/12/2024  RTC: 4 weeks, in person  Cancel: 1  No-show: 0  Next appt: 08/28/2024       Medication requested:   Pending Prescriptions:                       Disp   Refills    buPROPion (WELLBUTRIN XL) 150 MG 24 hr ta*30 tab*0            Sig: Take 1 tablet (150 mg) by mouth every morning           along with one 300 mg tablet for a total daily           dose of 450 mg    buPROPion (WELLBUTRIN XL) 300 MG 24 hr ta*30 tab*0            Sig: Take 1 tablet (300 mg) by mouth every morning           along with one 150 mg tablet for a total daily           dose of 450 mg      From chart note:    Continue Wellbutrin XR 450mg daily    Medication unable to be refilled by RN due to criteria not met as indicated.                 []Eligibility - not seen in the last year              []Supervision - no future appointment              [x]Compliance - no shows, cancellations or lapse in therapy              []Verification - order discrepancy              []Controlled medication              []Medication not included in policy              []90-day supply request              []Other:

## 2024-08-23 DIAGNOSIS — F41.8 DEPRESSION WITH ANXIETY: ICD-10-CM

## 2024-08-23 RX ORDER — QUETIAPINE FUMARATE 150 MG/1
75-150 TABLET, FILM COATED ORAL AT BEDTIME
Qty: 30 TABLET | Refills: 0 | Status: SHIPPED | OUTPATIENT
Start: 2024-08-23 | End: 2024-09-11 | Stop reason: ALTCHOICE
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
## 2024-08-23 NOTE — TELEPHONE ENCOUNTER
[facility] Call Center    Phone Message    May a detailed message be left on voicemail: yes     Reason for Call: Medication Refill Request    Has the patient contacted the pharmacy for the refill? Yes   Name of medication being requested: Quetiapine   Provider who prescribed the medication: Dr. Yousif  Pharmacy: [id number]   Date medication is needed: 8/23/24      Patient called requesting a refill for their prescription of Quetiapine. The patient is completely out of their current prescription, and was requesting a refill to be sent ASAP. The patient would also appreciate if someone would be able to let them know once the refill had been sent in.    Action Taken: Message routed to:  Other: [facility] psychiatry nurse [place]    Travel Screening: Not Applicable     Date of Service:

## 2024-09-11 ENCOUNTER — LAB (OUTPATIENT)
Dept: LAB | Facility: CLINIC | Age: 49
End: 2024-09-11
Attending: PSYCHIATRY & NEUROLOGY
Payer: COMMERCIAL

## 2024-09-11 ENCOUNTER — OFFICE VISIT (OUTPATIENT)
Dept: PSYCHIATRY | Facility: CLINIC | Age: 49
End: 2024-09-11
Attending: PSYCHIATRY & NEUROLOGY
Payer: COMMERCIAL

## 2024-09-11 ENCOUNTER — TELEPHONE (OUTPATIENT)
Dept: PSYCHIATRY | Facility: CLINIC | Age: 49
End: 2024-09-11
Payer: COMMERCIAL

## 2024-09-11 VITALS
WEIGHT: 257.2 LBS | DIASTOLIC BLOOD PRESSURE: 97 MMHG | BODY MASS INDEX: 41.63 KG/M2 | SYSTOLIC BLOOD PRESSURE: 149 MMHG | HEART RATE: 71 BPM

## 2024-09-11 DIAGNOSIS — F11.21 OPIOID USE DISORDER, SEVERE, IN SUSTAINED REMISSION (H): ICD-10-CM

## 2024-09-11 DIAGNOSIS — Z79.899 LONG TERM CURRENT USE OF ANTIPSYCHOTIC MEDICATION: Primary | ICD-10-CM

## 2024-09-11 DIAGNOSIS — F41.8 DEPRESSION WITH ANXIETY: ICD-10-CM

## 2024-09-11 DIAGNOSIS — Z72.0 NICOTINE USE: ICD-10-CM

## 2024-09-11 DIAGNOSIS — F11.90 OPIOID USE DISORDER: ICD-10-CM

## 2024-09-11 DIAGNOSIS — F41.9 ANXIETY: ICD-10-CM

## 2024-09-11 PROCEDURE — 80348 DRUG SCREENING BUPRENORPHINE: CPT

## 2024-09-11 PROCEDURE — 90792 PSYCH DIAG EVAL W/MED SRVCS: CPT | Mod: HN

## 2024-09-11 PROCEDURE — 80307 DRUG TEST PRSMV CHEM ANLYZR: CPT

## 2024-09-11 RX ORDER — CLONIDINE HYDROCHLORIDE 0.1 MG/1
0.1 TABLET ORAL DAILY
Qty: 45 TABLET | Refills: 0 | Status: SHIPPED | OUTPATIENT
Start: 2024-09-11

## 2024-09-11 RX ORDER — QUETIAPINE FUMARATE 50 MG/1
50-100 TABLET, FILM COATED ORAL AT BEDTIME
Qty: 60 TABLET | Refills: 1 | Status: SHIPPED | OUTPATIENT
Start: 2024-09-11 | End: 2024-09-11

## 2024-09-11 RX ORDER — CLONIDINE HYDROCHLORIDE 0.1 MG/1
0.1 TABLET ORAL DAILY
Qty: 30 TABLET | Refills: 1 | Status: SHIPPED | OUTPATIENT
Start: 2024-09-11 | End: 2024-09-11

## 2024-09-11 RX ORDER — QUETIAPINE FUMARATE 50 MG/1
50-100 TABLET, FILM COATED ORAL AT BEDTIME
Qty: 60 TABLET | Refills: 0 | Status: SHIPPED | OUTPATIENT
Start: 2024-09-11

## 2024-09-11 RX ORDER — BUPROPION HYDROCHLORIDE 150 MG/1
150 TABLET ORAL EVERY MORNING
Qty: 90 TABLET | Refills: 0 | Status: SHIPPED | OUTPATIENT
Start: 2024-09-11

## 2024-09-11 RX ORDER — BUPROPION HYDROCHLORIDE 300 MG/1
300 TABLET ORAL EVERY MORNING
Qty: 90 TABLET | Refills: 0 | Status: SHIPPED | OUTPATIENT
Start: 2024-09-11

## 2024-09-11 RX ORDER — BUPRENORPHINE 8 MG/1
8 TABLET SUBLINGUAL 3 TIMES DAILY
Qty: 90 TABLET | Refills: 0 | Status: SHIPPED | OUTPATIENT
Start: 2024-09-11

## 2024-09-11 ASSESSMENT — PAIN SCALES - GENERAL: PAINLEVEL: NO PAIN (0)

## 2024-09-11 NOTE — NURSING NOTE
Chief Complaint   Patient presents with    Recheck Medication     Opioid use disorder     - Nikko Marcos, Visit Facilitator

## 2024-09-11 NOTE — TELEPHONE ENCOUNTER
Date of Last Office Visit: 9/11/2024  [facility]      RTC: 4 weeks (around 10/9/2024)   No shows: 0  Cancellations: 0  Date of Next Office Visit:   8:00 AM (60 min)  Arrived    Arrive by:  7:45 AM   ADDICTION MED RETURN   URPSY ([facility])   Michelle Yousif MD     ------------------------------    Medication requested:    buPROPion (WELLBUTRIN XL) 150 MG 24 hr tablet 30 tablet 0 8/13/2024 -- No   Sig - Route: Take 1 tablet (150 mg) by mouth every morning along with one 300 mg tablet for a total daily dose of 450 mg - Oral     buPROPion (WELLBUTRIN XL) 300 MG 24 hr tablet 30 tablet 0 8/13/2024 -- No   Sig - Route: Take 1 tablet (300 mg) by mouth every morning along with one 150 mg tablet for a total daily dose of 450 mg - Oral     ------------------------------  From last visit note:   N/a    Refill decision: Refill pended and routed to the provider for review/determination due to the following criteria not met:     Last visit treatment plan Open/Pended/In Process, routing for review.
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
## 2024-09-11 NOTE — TELEPHONE ENCOUNTER
[facility] Call Center    Phone Message    May a detailed message be left on voicemail: yes     Reason for Call: Medication Refill Request    Has the patient contacted the pharmacy for the refill? Yes     Name of medication being requested: Bupropion 150 and 300 MG  Provider who prescribed the medication: Michelle Yousif     Pharmacy: [id number] [facility] [phone]   [facility] [id number]     Date medication is needed: ASAP pt stated he is completely out of medication.    Patient stated how frustrating it is having to call the clinic every month for a refill regarding this medication.    Action Taken: Other: [place] [employer] [facility]     Travel Screening: Not Applicable

## 2024-09-11 NOTE — PATIENT INSTRUCTIONS
Medication Changes:  -Please stop day time quetiapine use and take .1mg of Clonidine instead (scheduled).   -Decreasing bedtime Quetiapine dose to 50-100mg.     Other:   -If you notice any headache, chest pain, blurry vision, abdominal pain or nausea, please seek out emergency services  -We recommend following up with your primary care provider for further management of your high blood pressure.   -Labs ordered: Please have these done at any [facility] as soon as possible. These include Buprenorphine level, urine drug screen, complete blood cell count comprehensive metabolic panel (electrolyte, liver and kidney function) , lipid levels (cholesterol) , A1c (representative of blood sugar levels over a 3 month period of time)    -Follow Up: 4 weeks

## 2024-09-14 LAB
BUPRENORPHINE UR-MCNC: <5 NG/ML
BUPRENORPHINE UR-MCNC: >1000 NG/ML
NALOXONE UR CFM-MCNC: <100 NG/ML
NORBUPRENORPHINE UR CFM-MCNC: <5 NG/ML
NORBUPRENORPHINE UR-MCNC: 230 NG/ML

## 2024-10-22 ENCOUNTER — TELEPHONE (OUTPATIENT)
Dept: PSYCHIATRY | Facility: CLINIC | Age: 49
End: 2024-10-22
Payer: COMMERCIAL

## 2024-10-22 NOTE — TELEPHONE ENCOUNTER
[facility] Call Center    Phone Message    May a detailed message be left on voicemail: yes     Reason for Call: Other: Patient calling to request NPI # of Dr. Yousif and states that he needs it asap. Writer scheduled follow-up with provider for 10/30.     Action Taken: Message routed to:  Other: P PSYCHIATRY NURSE-P    Travel Screening: Not Applicable     Date of Service:

## 2024-10-22 NOTE — TELEPHONE ENCOUNTER
Returned call to Ajith. He reports needing his insurance ID number and can't find his insurance card. Writer [facility] we [facility]. Ajith denies needing any further assistance.

## 2024-10-24 ENCOUNTER — TELEPHONE (OUTPATIENT)
Dept: PSYCHIATRY | Facility: CLINIC | Age: 49
End: 2024-10-24
Payer: COMMERCIAL

## 2024-10-24 DIAGNOSIS — F11.90 OPIOID USE DISORDER: ICD-10-CM

## 2024-10-24 DIAGNOSIS — F41.8 DEPRESSION WITH ANXIETY: ICD-10-CM

## 2024-10-24 DIAGNOSIS — F41.9 ANXIETY: ICD-10-CM

## 2024-10-24 RX ORDER — BUPROPION HYDROCHLORIDE 300 MG/1
300 TABLET ORAL EVERY MORNING
Qty: 14 TABLET | Refills: 0 | Status: SHIPPED | OUTPATIENT
Start: 2024-10-24 | End: 2024-11-06

## 2024-10-24 RX ORDER — QUETIAPINE FUMARATE 50 MG/1
50-100 TABLET, FILM COATED ORAL AT BEDTIME
Qty: 28 TABLET | Refills: 0 | Status: SHIPPED | OUTPATIENT
Start: 2024-10-24 | End: 2024-11-06

## 2024-10-24 RX ORDER — BUPROPION HYDROCHLORIDE 150 MG/1
150 TABLET ORAL EVERY MORNING
Qty: 14 TABLET | Refills: 0 | Status: SHIPPED | OUTPATIENT
Start: 2024-10-24 | End: 2024-11-06

## 2024-10-24 RX ORDER — CLONIDINE HYDROCHLORIDE 0.1 MG/1
0.1 TABLET ORAL DAILY
Qty: 14 TABLET | Refills: 0 | Status: SHIPPED | OUTPATIENT
Start: 2024-10-24 | End: 2024-11-06
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
## 2024-10-24 NOTE — TELEPHONE ENCOUNTER
- Meds refilled by provider for 2 week supply   - Med tab changed to reflect this   - Patient notified

## 2024-10-24 NOTE — TELEPHONE ENCOUNTER
Hello,     I put in orders for a 2 week refill on all the medications prescribed by out clinic (except for Suboxone). For further refills, he will need to attend his next appointment on 10/30.     Thank You   Michelle
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
## 2024-11-06 ENCOUNTER — VIRTUAL VISIT (OUTPATIENT)
Dept: PSYCHIATRY | Facility: CLINIC | Age: 49
End: 2024-11-06
Attending: PSYCHIATRY & NEUROLOGY
Payer: COMMERCIAL

## 2024-11-06 DIAGNOSIS — F41.9 ANXIETY: ICD-10-CM

## 2024-11-06 DIAGNOSIS — F41.8 DEPRESSION WITH ANXIETY: ICD-10-CM

## 2024-11-06 DIAGNOSIS — F11.21 OPIOID USE DISORDER, SEVERE, IN SUSTAINED REMISSION (H): ICD-10-CM

## 2024-11-06 DIAGNOSIS — Z79.899 LONG TERM CURRENT USE OF ANTIPSYCHOTIC MEDICATION: ICD-10-CM

## 2024-11-06 DIAGNOSIS — I10 PRIMARY HYPERTENSION: Primary | ICD-10-CM

## 2024-11-06 DIAGNOSIS — Z72.0 NICOTINE USE: ICD-10-CM

## 2024-11-06 DIAGNOSIS — F41.8 DEPRESSION WITH ANXIETY: Primary | ICD-10-CM

## 2024-11-06 RX ORDER — BUPROPION HYDROCHLORIDE 300 MG/1
300 TABLET ORAL EVERY MORNING
Qty: 30 TABLET | Refills: 1 | Status: SHIPPED | OUTPATIENT
Start: 2024-11-06

## 2024-11-06 RX ORDER — BUPROPION HYDROCHLORIDE 150 MG/1
150 TABLET ORAL EVERY MORNING
Qty: 30 TABLET | Refills: 1 | Status: SHIPPED | OUTPATIENT
Start: 2024-11-06

## 2024-11-06 RX ORDER — QUETIAPINE FUMARATE 50 MG/1
50-100 TABLET, FILM COATED ORAL AT BEDTIME
Qty: 60 TABLET | Refills: 1 | Status: SHIPPED | OUTPATIENT
Start: 2024-11-06

## 2024-11-06 RX ORDER — CLONIDINE HYDROCHLORIDE 0.1 MG/1
0.1 TABLET ORAL DAILY PRN
Qty: 30 TABLET | Refills: 1 | Status: SHIPPED | OUTPATIENT
Start: 2024-11-06

## 2024-11-06 RX ORDER — BUPRENORPHINE 8 MG/1
8 TABLET SUBLINGUAL 3 TIMES DAILY
Qty: 90 TABLET | Refills: 0 | Status: SHIPPED | OUTPATIENT
Start: 2024-11-06

## 2024-11-06 ASSESSMENT — PAIN SCALES - GENERAL: PAINLEVEL_OUTOF10: NO PAIN (0)

## 2024-11-06 ASSESSMENT — PATIENT HEALTH QUESTIONNAIRE - PHQ9
SUM OF ALL RESPONSES TO PHQ QUESTIONS 1-9: 0
10. IF YOU CHECKED OFF ANY PROBLEMS, HOW DIFFICULT HAVE THESE PROBLEMS MADE IT FOR YOU TO DO YOUR WORK, TAKE CARE OF THINGS AT HOME, OR GET ALONG WITH OTHER PEOPLE: NOT DIFFICULT AT ALL
SUM OF ALL RESPONSES TO PHQ QUESTIONS 1-9: 0

## 2024-11-06 NOTE — PATIENT INSTRUCTIONS
Medication Changes:  -We will change the clonidine from scheduled daily to as needed daily for anxiety    Other:  -Please follow up with your primary care provider for routine follow up and to address concerns with weight management, diabetes management, and hypertension. Please seek out emergency services if you notice new and/or worsening chest pain, headache, abdominal pain.   -We will order a blood pressure cuff for you. Please check this at rest at home before your primary care provider appointment.     Follow Up: 1 month    **For crisis resources, please see the information at the end of this document**   Patient Education    Thank you for coming to the [facility] [place] [facility].     Lab Testing:  If you had lab testing today and your results are reassuring or normal they will be mailed to you or sent through [employer] within 7 days. If the lab tests need quick action we will call you with the results. The phone number we will call with results is # [phone]. If this is not the best number please call our clinic and change the number.     Medication Refills:  If you need any refills please call your pharmacy and they will contact us. Our fax number for refills is [phone].   Three business days of notice are needed for general medication refill requests.   Five business days of notice are needed for controlled substance refill requests.   If you need to change to a different pharmacy, please contact the new pharmacy directly. The new pharmacy will help you get your medications transferred.     Contact Us:  Please call [phone] during business hours (8-5:00 M-F).   If you have medication related questions after clinic hours, or on the weekend, please call [phone].     Financial Assistance [phone]   Medical Records [phone]       MENTAL HEALTH CRISIS RESOURCES:  For a emergency help, please call 911 or go to the nearest Emergency Department.      Emergency Walk-In Options:   EmPATH Unit @ [facility] ([place]): [phone] - Specialized mental health emergency area designed to be calming  [facility] ([place]): [phone]  [facility] ([place]): [phone]  [facility] ([place]): [phone]    County Crisis Information:   [place]: [phone]  [place]: [phone]  [place] (COPE) - Adult: [phone]     Child: [phone]  [place] - Adult: [phone]     Child: [phone]  [place]: [phone]  List of all [facility] resources:   https://[place].[facility]/dhs/people-we-serve/adults/health-care/mental-health/resources/crisis-contacts.jsp    National Crisis Information:   Crisis Text Line: Text  [place]  to 074780  Suicide & Crisis Lifeline: 988  National Suicide Prevention Lifeline: 5-734-892-TALK (1-887.526.9223)       For online chat options, visit https://suicidepreventionlifeline.org/chat/  Poison Control Center: 1-635.383.8077  Trans Lifeline: 1-711.588.1798 - Hotline for transgender people of all ages  [employer]: 0-988-760-6729 - Hotline for LGBT youth     For Non-Emergency Support:   Fast Tracker: Mental Health & Substance Use Disorder Resources -   https://www.[employer].org/
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
## 2024-11-06 NOTE — NURSING NOTE
Current patient location: [address] [place] [address] [address]  [facility] [address]    Is the patient currently in the state of [place]? YES    Visit mode:VIDEO    If the visit is dropped, the patient can be reconnected by: VIDEO VISIT: Send to e-mail at: [email or web address]    Will anyone else be joining the visit? NO  (If patient encounters technical issues they should call [phone]    Are changes needed to the allergy or medication list? No  Pt not allergic to peanuts    Are refills needed on medications prescribed by this physician? YES    Rooming Documentation:  Questionnaire(s) completed    Reason for visit: GINA DAVISF

## 2024-12-27 DIAGNOSIS — F41.8 DEPRESSION WITH ANXIETY: ICD-10-CM

## 2024-12-30 RX ORDER — QUETIAPINE FUMARATE 50 MG/1
50-100 TABLET, FILM COATED ORAL AT BEDTIME
Qty: 60 TABLET | Refills: 0 | Status: SHIPPED | OUTPATIENT
Start: 2024-12-30

## 2024-12-30 RX ORDER — BUPROPION HYDROCHLORIDE 150 MG/1
150 TABLET ORAL EVERY MORNING
Qty: 30 TABLET | Refills: 0 | Status: SHIPPED | OUTPATIENT
Start: 2024-12-30

## 2024-12-30 RX ORDER — BUPROPION HYDROCHLORIDE 300 MG/1
300 TABLET ORAL EVERY MORNING
Qty: 30 TABLET | Refills: 0 | Status: SHIPPED | OUTPATIENT
Start: 2024-12-30

## 2024-12-30 NOTE — TELEPHONE ENCOUNTER
Patient now scheduled with Dr. WYMAN for 1/8. Confirmed with the pharmacy that he last refilled the bupropion prescriptions on 11/26 for a 30 d/s and the Seroquel was filled on 12/11 for a 30 d/s.    Medications pended and routed to Dr. WYMAN due to one no show.    Sonya Whitaker RN on 12/30/2024 at 9:21 AM

## 2024-12-30 NOTE — TELEPHONE ENCOUNTER
Last seen: 11/06/2024  RTC: 4 Weeks  Cancel: 0  No-show: 1  Next appt: None, also routing to scheduling  Last filled: 11/06/2024     Incoming refill from patient via phone by patient or caregiver    Medication requested:   Pending Prescriptions:                       Disp   Refills    buPROPion (WELLBUTRIN XL) 300 MG 24 hr ta*30 tab*1            Sig: TAKE ONE TABLET BY MOUTH EVERY MORNING    buPROPion (WELLBUTRIN XL) 150 MG 24 hr ta*30 tab*1            Sig: TAKE 1 TABLET (150 MG) BY MOUTH EVERY MORNING.           TAKE ALONG WITH  MG TABLET FOR A TOTAL           DAILY DOSE  MG.    QUEtiapine (SEROQUEL) 50 MG tablet [Pharm*60 tab*1            Sig: TAKE ONE TO TWO TABLETS BY MOUTH AT BEDTIME        From chart note:   Change Clonidine 0.1mg for focus, anxiety, irritability from scheduled to prn.   - Continue Seroquel 50-100mg for sleep.  - Continue Wellbutrin XR 450mg daily      Refill sent to [facility] for final review.

## 2025-01-04 ENCOUNTER — HEALTH MAINTENANCE LETTER (OUTPATIENT)
Age: 50
End: 2025-01-04

## 2025-01-20 DIAGNOSIS — F11.21 OPIOID USE DISORDER, SEVERE, IN SUSTAINED REMISSION (H): ICD-10-CM

## 2025-01-20 NOTE — TELEPHONE ENCOUNTER
Last seen: 11/06/2024  RTC: 4 weeks, required in person  Cancel: 1  No-show: 1  Next appt: 01/22/2025      Incoming refill from patient via phone by patient or caregiver    Medication requested:   Pending Prescriptions:                       Disp   Refills    buprenorphine (SUBUTEX) 8 MG SUBL subling*90 tab*0            Sig: Place 1 tablet (8 mg) under the tongue 3 times           daily.      From chart note:   - Continue Subutex at 24mg total daily dose. Reports using 16mg some days and 24mg other days. Would like to keep current prescription the same for now.       Last refill per       Medication unable to be refilled by RN due to criteria not met as indicated.                 []Eligibility - not seen in the last year              []Supervision - no future appointment              [x]Compliance - no shows, cancellations or lapse in therapy              []Verification - order discrepancy              [x]Controlled medication              []Medication not included in policy              []90-day supply request              []Other:

## 2025-01-20 NOTE — TELEPHONE ENCOUNTER
[facility] Call Center    Phone Message    May a detailed message be left on voicemail: yes     Reason for Call: Medication Refill Request    Has the patient contacted the pharmacy for the refill? Yes   Name of medication being requested: Subutex 8mg  Provider who prescribed the medication: Dr. Yousif  Pharmacy: [address]   Date medication is needed: Caller stated he needs this medication and hoped to get this filled today and stated he is going through some things and is becoming unstable. Caller stated he did not receive this medication last month, is not sure why, but is completely out of the medication. Caller stated he will also call the pharmacy.    Action Taken: Message routed to:  Other: [facility] Nurse pool    Travel Screening: Not Applicable     Date of Service:

## 2025-01-20 NOTE — TELEPHONE ENCOUNTER
After speaking with Dr. Aguayo, writer got an an order for two days of buprenorphine (SUBUTEX) 8 MG SUBL tablets to get him to his appointment on 1/22/2025.  Writer then called pharmacy to ensure this prescription was received.  Pharmacist said that medication would be ready today.    Patient called and notified that mediation would be available and that he HAS TO come to his appointment IN PERSON to have any additional refills.  Patient agreed.

## 2025-01-20 NOTE — TELEPHONE ENCOUNTER
Patient calling clinic again regarding medication, wondering if he could receive update about whether he would be able to refill medication by today

## 2025-01-21 RX ORDER — BUPRENORPHINE 8 MG/1
8 TABLET SUBLINGUAL 3 TIMES DAILY
Qty: 9 TABLET | Refills: 0 | Status: SHIPPED | OUTPATIENT
Start: 2025-01-21 | End: 2025-01-22

## 2025-01-22 ENCOUNTER — VIRTUAL VISIT (OUTPATIENT)
Dept: PSYCHIATRY | Facility: CLINIC | Age: 50
End: 2025-01-22
Attending: PSYCHIATRY & NEUROLOGY
Payer: COMMERCIAL

## 2025-01-22 DIAGNOSIS — F41.8 DEPRESSION WITH ANXIETY: ICD-10-CM

## 2025-01-22 DIAGNOSIS — F11.21 OPIOID USE DISORDER, SEVERE, IN SUSTAINED REMISSION (H): ICD-10-CM

## 2025-01-22 DIAGNOSIS — Z72.0 NICOTINE USE: Primary | ICD-10-CM

## 2025-01-22 DIAGNOSIS — Z79.899 LONG TERM CURRENT USE OF ANTIPSYCHOTIC MEDICATION: ICD-10-CM

## 2025-01-22 PROCEDURE — G2211 COMPLEX E/M VISIT ADD ON: HCPCS | Mod: 95

## 2025-01-22 PROCEDURE — 98006 SYNCH AUDIO-VIDEO EST MOD 30: CPT | Mod: GC

## 2025-01-22 RX ORDER — BUPROPION HYDROCHLORIDE 150 MG/1
150 TABLET ORAL EVERY MORNING
Qty: 30 TABLET | Refills: 2 | Status: SHIPPED | OUTPATIENT
Start: 2025-01-22

## 2025-01-22 RX ORDER — CLONIDINE HYDROCHLORIDE 0.1 MG/1
0.1 TABLET ORAL DAILY PRN
Qty: 30 TABLET | Refills: 3 | Status: SHIPPED | OUTPATIENT
Start: 2025-01-22

## 2025-01-22 RX ORDER — BUPROPION HYDROCHLORIDE 300 MG/1
300 TABLET ORAL EVERY MORNING
Qty: 30 TABLET | Refills: 2 | Status: SHIPPED | OUTPATIENT
Start: 2025-01-22

## 2025-01-22 RX ORDER — BUPRENORPHINE 8 MG/1
8 TABLET SUBLINGUAL 3 TIMES DAILY
Qty: 90 TABLET | Refills: 2 | Status: SHIPPED | OUTPATIENT
Start: 2025-01-22

## 2025-01-22 RX ORDER — QUETIAPINE FUMARATE 50 MG/1
50-100 TABLET, FILM COATED ORAL AT BEDTIME
Qty: 60 TABLET | Refills: 3 | Status: SHIPPED | OUTPATIENT
Start: 2025-01-22

## 2025-01-22 ASSESSMENT — PATIENT HEALTH QUESTIONNAIRE - PHQ9
SUM OF ALL RESPONSES TO PHQ QUESTIONS 1-9: 2
SUM OF ALL RESPONSES TO PHQ QUESTIONS 1-9: 2
10. IF YOU CHECKED OFF ANY PROBLEMS, HOW DIFFICULT HAVE THESE PROBLEMS MADE IT FOR YOU TO DO YOUR WORK, TAKE CARE OF THINGS AT HOME, OR GET ALONG WITH OTHER PEOPLE: NOT DIFFICULT AT ALL
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
## 2025-03-02 ENCOUNTER — HEALTH MAINTENANCE LETTER (OUTPATIENT)
Age: 50
End: 2025-03-02

## 2025-04-16 ENCOUNTER — VIRTUAL VISIT (OUTPATIENT)
Dept: PSYCHIATRY | Facility: CLINIC | Age: 50
End: 2025-04-16
Attending: PSYCHIATRY & NEUROLOGY
Payer: COMMERCIAL

## 2025-04-16 DIAGNOSIS — F11.21 OPIOID USE DISORDER, SEVERE, IN SUSTAINED REMISSION (H): Primary | ICD-10-CM

## 2025-04-16 DIAGNOSIS — Z72.0 NICOTINE USE: ICD-10-CM

## 2025-04-16 DIAGNOSIS — Z79.899 LONG TERM CURRENT USE OF ANTIPSYCHOTIC MEDICATION: ICD-10-CM

## 2025-04-16 DIAGNOSIS — F41.8 DEPRESSION WITH ANXIETY: ICD-10-CM

## 2025-04-16 PROCEDURE — G2211 COMPLEX E/M VISIT ADD ON: HCPCS | Mod: 95

## 2025-04-16 PROCEDURE — 90833 PSYTX W PT W E/M 30 MIN: CPT | Mod: 95

## 2025-04-16 PROCEDURE — 1126F AMNT PAIN NOTED NONE PRSNT: CPT | Mod: 95

## 2025-04-16 PROCEDURE — 98006 SYNCH AUDIO-VIDEO EST MOD 30: CPT | Mod: GC

## 2025-04-16 RX ORDER — QUETIAPINE FUMARATE 50 MG/1
50-100 TABLET, FILM COATED ORAL AT BEDTIME
Qty: 60 TABLET | Refills: 2 | Status: SHIPPED | OUTPATIENT
Start: 2025-04-16

## 2025-04-16 RX ORDER — BUPROPION HYDROCHLORIDE 300 MG/1
300 TABLET ORAL EVERY MORNING
Qty: 30 TABLET | Refills: 2 | Status: SHIPPED | OUTPATIENT
Start: 2025-04-16

## 2025-04-16 RX ORDER — CLONIDINE HYDROCHLORIDE 0.1 MG/1
0.1 TABLET ORAL DAILY PRN
Qty: 30 TABLET | Refills: 2 | Status: SHIPPED | OUTPATIENT
Start: 2025-04-16

## 2025-04-16 RX ORDER — BUPROPION HYDROCHLORIDE 150 MG/1
150 TABLET ORAL EVERY MORNING
Qty: 30 TABLET | Refills: 2 | Status: SHIPPED | OUTPATIENT
Start: 2025-04-16

## 2025-04-16 ASSESSMENT — PAIN SCALES - GENERAL: PAINLEVEL_OUTOF10: NO PAIN (0)

## 2025-04-16 NOTE — NURSING NOTE
Current patient location: [address]  [facility] [address]    Is the patient currently in the state of [place]? YES    Visit mode: VIDEO    If the visit is dropped, the patient can be reconnected by:VIDEO VISIT: Text to cell phone:   Telephone Information:   Mobile [phone]       Will anyone else be joining the visit? NO  (If patient encounters technical issues they should call [phone]    Are changes needed to the allergy or medication list? Yes PT STATES HE DOESN'T HAVE A PEANUT ALLERGY and would like that removed from his list.    Are refills needed on medications prescribed by this physician? YES    Rooming Documentation:  Unable to complete questionnaire(s) due to time    Reason for visit: GINA DAVISF
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
## 2025-04-16 NOTE — PATIENT INSTRUCTIONS
Medication Changes:   None today. I called your pharmacy and you have 2 Subutex refills remaining. Please reach out if you run out before your next appointment.    Other: Please obtain your urine drug screen today.     Follow Up: 2 months. In person if possible.     **For crisis resources, please see the information at the end of this document**   Patient Education    Thank you for coming to the [facility] [place] [facility].     Lab Testing:  If you had lab testing today and your results are reassuring or normal they will be mailed to you or sent through [employer] within 7 days. If the lab tests need quick action we will call you with the results. The phone number we will call with results is # [phone]. If this is not the best number please call our clinic and change the number.     Medication Refills:  If you need any refills please call your pharmacy and they will contact us. Our fax number for refills is [phone].   Three business days of notice are needed for general medication refill requests.   Five business days of notice are needed for controlled substance refill requests.   If you need to change to a different pharmacy, please contact the new pharmacy directly. The new pharmacy will help you get your medications transferred.     Contact Us:  Please call [phone] during business hours (8-5:00 M-F).   If you have medication related questions after clinic hours, or on the weekend, please call [phone].     Financial Assistance [phone]   Medical Records [phone]       MENTAL HEALTH CRISIS RESOURCES:  For a emergency help, please call 911 or go to the nearest Emergency Department.     Emergency Walk-In Options:   Em[facility] Unit @ [facility] ([place]): [phone] - Specialized mental health emergency area designed to be calming  [facility] ([place]): [phone]  [facility] ([place]):  [phone]  [facility] ([place]): [phone]    [facility] Crisis Information:   [place]: [phone]  [place]: [phone]  [place]ah[place] STRATTON) - Adult: [phone]     Child: [phone]  [place] - Adult: [phone]     Child: [phone]  [place]: [phone]  List of all [facility] resources:   https://[email or web address]/dhs/people-we-serve/adults/health-care/mental-health/resources/crisis-contacts.jsp    National Crisis Information:   Crisis Text Line: Text  [place]  to 572486  Suicide & Crisis Lifeline: 988  National Suicide Prevention Lifeline: 6-885-072-TALK (1-948.404.6706)       For online chat options, visit https://suicidepreventionlifeline.org/chat/  Poison Control Center: 1-242.969.3956  Trans Lifeline: 3-408-605-3042 - Hotline for transgender people of all ages  The [place] [employer]: 9-878-199-8955 - Hotline for LGBT youth     For Non-Emergency Support:   Fast Tracker: Mental Health & Substance Use Disorder Resources -   https://www.Zventsn.org/

## 2025-06-04 ENCOUNTER — DOCUMENTATION ONLY (OUTPATIENT)
Dept: PSYCHIATRY | Facility: CLINIC | Age: 50
End: 2025-06-04
Payer: COMMERCIAL

## 2025-06-04 NOTE — PROGRESS NOTES
jAith could not make his appointment today due to a job interview. Scheduling notified to reschedule appt.   I called the pharmacy to verify when he last refilled his medications.     -Subutex: Last picked up: In January. Has 2 refills available.    -Per PDMP: Last Sold: 1/22/25.  Has 2 refills.   -Bupoprion: Has refills Last picked up March.  -Clonidine: Has refills. Last picked up February.   -Quetiapine:  Has refills. Picked this up the end of May    Discussed with supervisor Josias Puckett. As he has refills I will not make any changes/refill any medications. I will see if our team can check in with him next week and remind him of the refills and make sure he has an appt scheduled.

## 2025-06-19 ENCOUNTER — TELEPHONE (OUTPATIENT)
Dept: PSYCHIATRY | Facility: CLINIC | Age: 50
End: 2025-06-19
Payer: COMMERCIAL

## 2025-06-19 NOTE — TELEPHONE ENCOUNTER
Attempted to call patient, per provider request below.  No answer. Unable to leave voice message.        I am wondering if nursing could follow up with him in a week and see if he is taking Suboxone and his other medications, remind him he has refills and also get him scheduled for follow up with me or the next provider ( I think is Eileen German?)?

## 2025-07-12 ENCOUNTER — HEALTH MAINTENANCE LETTER (OUTPATIENT)
Age: 50
End: 2025-07-12